# Patient Record
Sex: FEMALE | Race: BLACK OR AFRICAN AMERICAN | NOT HISPANIC OR LATINO
[De-identification: names, ages, dates, MRNs, and addresses within clinical notes are randomized per-mention and may not be internally consistent; named-entity substitution may affect disease eponyms.]

---

## 2017-03-29 ENCOUNTER — LABORATORY RESULT (OUTPATIENT)
Age: 62
End: 2017-03-29

## 2017-04-03 ENCOUNTER — APPOINTMENT (OUTPATIENT)
Age: 62
End: 2017-04-03

## 2017-04-03 VITALS
RESPIRATION RATE: 12 BRPM | TEMPERATURE: 98.2 F | SYSTOLIC BLOOD PRESSURE: 139 MMHG | HEART RATE: 73 BPM | WEIGHT: 188 LBS | DIASTOLIC BLOOD PRESSURE: 88 MMHG | HEIGHT: 68 IN | BODY MASS INDEX: 28.49 KG/M2

## 2017-04-13 ENCOUNTER — APPOINTMENT (OUTPATIENT)
Age: 62
End: 2017-04-13

## 2017-04-19 ENCOUNTER — APPOINTMENT (OUTPATIENT)
Age: 62
End: 2017-04-19

## 2017-10-19 ENCOUNTER — OUTPATIENT (OUTPATIENT)
Dept: OUTPATIENT SERVICES | Facility: HOSPITAL | Age: 62
LOS: 1 days | End: 2017-10-19
Payer: MEDICARE

## 2017-10-19 ENCOUNTER — APPOINTMENT (OUTPATIENT)
Dept: ULTRASOUND IMAGING | Facility: IMAGING CENTER | Age: 62
End: 2017-10-19
Payer: MEDICARE

## 2017-10-19 DIAGNOSIS — K76.0 FATTY (CHANGE OF) LIVER, NOT ELSEWHERE CLASSIFIED: ICD-10-CM

## 2017-10-19 LAB
ALBUMIN SERPL ELPH-MCNC: 4.6 G/DL
ALP BLD-CCNC: 114 U/L
ALT SERPL-CCNC: 62 U/L
ANION GAP SERPL CALC-SCNC: 14 MMOL/L
AST SERPL-CCNC: 54 U/L
BASOPHILS # BLD AUTO: 0.01 K/UL
BASOPHILS NFR BLD AUTO: 0.3 %
BILIRUB SERPL-MCNC: 0.7 MG/DL
BUN SERPL-MCNC: 12 MG/DL
CALCIUM SERPL-MCNC: 9.9 MG/DL
CHLORIDE SERPL-SCNC: 100 MMOL/L
CO2 SERPL-SCNC: 27 MMOL/L
CREAT SERPL-MCNC: 1.07 MG/DL
EOSINOPHIL # BLD AUTO: 0.06 K/UL
EOSINOPHIL NFR BLD AUTO: 2 %
HCT VFR BLD CALC: 42.5 %
HGB BLD-MCNC: 14.4 G/DL
IMM GRANULOCYTES NFR BLD AUTO: 0.3 %
INR PPP: 1.1 RATIO
LYMPHOCYTES # BLD AUTO: 1.35 K/UL
LYMPHOCYTES NFR BLD AUTO: 45.9 %
MAN DIFF?: NORMAL
MCHC RBC-ENTMCNC: 31.4 PG
MCHC RBC-ENTMCNC: 33.9 GM/DL
MCV RBC AUTO: 92.6 FL
MONOCYTES # BLD AUTO: 0.25 K/UL
MONOCYTES NFR BLD AUTO: 8.5 %
NEUTROPHILS # BLD AUTO: 1.26 K/UL
NEUTROPHILS NFR BLD AUTO: 43 %
PLATELET # BLD AUTO: 162 K/UL
POTASSIUM SERPL-SCNC: 3.8 MMOL/L
PROT SERPL-MCNC: 8.1 G/DL
PT BLD: 12.5 SEC
RBC # BLD: 4.59 M/UL
RBC # FLD: 13.5 %
SODIUM SERPL-SCNC: 141 MMOL/L
WBC # FLD AUTO: 2.94 K/UL

## 2017-10-19 PROCEDURE — 76700 US EXAM ABDOM COMPLETE: CPT

## 2017-10-19 PROCEDURE — 76700 US EXAM ABDOM COMPLETE: CPT | Mod: 26

## 2017-10-20 LAB — AFP-TM SERPL-MCNC: 7.6 NG/ML

## 2017-10-26 ENCOUNTER — APPOINTMENT (OUTPATIENT)
Age: 62
End: 2017-10-26
Payer: MEDICARE

## 2017-10-26 VITALS
RESPIRATION RATE: 17 BRPM | HEART RATE: 73 BPM | WEIGHT: 182 LBS | SYSTOLIC BLOOD PRESSURE: 138 MMHG | BODY MASS INDEX: 27.58 KG/M2 | TEMPERATURE: 98.4 F | HEIGHT: 68 IN | DIASTOLIC BLOOD PRESSURE: 86 MMHG

## 2017-10-26 PROCEDURE — 99214 OFFICE O/P EST MOD 30 MIN: CPT

## 2017-12-13 LAB
ALBUMIN SERPL ELPH-MCNC: 4.4 G/DL
ALP BLD-CCNC: 102 U/L
ALT SERPL-CCNC: 23 U/L
ANION GAP SERPL CALC-SCNC: 15 MMOL/L
AST SERPL-CCNC: 26 U/L
BILIRUB SERPL-MCNC: 0.5 MG/DL
BUN SERPL-MCNC: 15 MG/DL
CALCIUM SERPL-MCNC: 9.1 MG/DL
CHLORIDE SERPL-SCNC: 100 MMOL/L
CO2 SERPL-SCNC: 27 MMOL/L
CREAT SERPL-MCNC: 0.93 MG/DL
POTASSIUM SERPL-SCNC: 3.7 MMOL/L
PROT SERPL-MCNC: 7.8 G/DL
SODIUM SERPL-SCNC: 142 MMOL/L

## 2017-12-14 ENCOUNTER — APPOINTMENT (OUTPATIENT)
Age: 62
End: 2017-12-14
Payer: MEDICARE

## 2017-12-14 PROCEDURE — 91200 LIVER ELASTOGRAPHY: CPT

## 2018-01-01 ENCOUNTER — TRANSCRIPTION ENCOUNTER (OUTPATIENT)
Age: 63
End: 2018-01-01

## 2018-01-02 ENCOUNTER — TRANSCRIPTION ENCOUNTER (OUTPATIENT)
Age: 63
End: 2018-01-02

## 2018-01-31 ENCOUNTER — APPOINTMENT (OUTPATIENT)
Age: 63
End: 2018-01-31
Payer: MEDICARE

## 2018-01-31 VITALS
DIASTOLIC BLOOD PRESSURE: 142 MMHG | SYSTOLIC BLOOD PRESSURE: 173 MMHG | WEIGHT: 186 LBS | RESPIRATION RATE: 14 BRPM | BODY MASS INDEX: 28.19 KG/M2 | TEMPERATURE: 98.5 F | HEIGHT: 68 IN | HEART RATE: 69 BPM

## 2018-01-31 VITALS — DIASTOLIC BLOOD PRESSURE: 86 MMHG | SYSTOLIC BLOOD PRESSURE: 140 MMHG

## 2018-01-31 PROCEDURE — 99214 OFFICE O/P EST MOD 30 MIN: CPT

## 2018-02-13 ENCOUNTER — APPOINTMENT (OUTPATIENT)
Dept: OPHTHALMOLOGY | Facility: CLINIC | Age: 63
End: 2018-02-13
Payer: MEDICARE

## 2018-02-13 DIAGNOSIS — H16.002 UNSPECIFIED CORNEAL ULCER, LEFT EYE: ICD-10-CM

## 2018-02-13 PROCEDURE — 92014 COMPRE OPH EXAM EST PT 1/>: CPT

## 2018-04-30 ENCOUNTER — FORM ENCOUNTER (OUTPATIENT)
Age: 63
End: 2018-04-30

## 2018-05-01 ENCOUNTER — APPOINTMENT (OUTPATIENT)
Dept: ULTRASOUND IMAGING | Facility: IMAGING CENTER | Age: 63
End: 2018-05-01
Payer: MEDICARE

## 2018-05-01 ENCOUNTER — OUTPATIENT (OUTPATIENT)
Dept: OUTPATIENT SERVICES | Facility: HOSPITAL | Age: 63
LOS: 1 days | End: 2018-05-01
Payer: MEDICARE

## 2018-05-01 DIAGNOSIS — K74.60 UNSPECIFIED CIRRHOSIS OF LIVER: ICD-10-CM

## 2018-05-01 PROCEDURE — 76700 US EXAM ABDOM COMPLETE: CPT

## 2018-05-01 PROCEDURE — 76700 US EXAM ABDOM COMPLETE: CPT | Mod: 26

## 2018-05-02 ENCOUNTER — LABORATORY RESULT (OUTPATIENT)
Age: 63
End: 2018-05-02

## 2018-05-03 LAB
ALBUMIN SERPL ELPH-MCNC: 4.2 G/DL
ALP BLD-CCNC: 100 U/L
ALT SERPL-CCNC: 22 U/L
ANION GAP SERPL CALC-SCNC: 12 MMOL/L
AST SERPL-CCNC: 23 U/L
BASOPHILS # BLD AUTO: 0.02 K/UL
BASOPHILS NFR BLD AUTO: 0.6 %
BILIRUB SERPL-MCNC: 0.5 MG/DL
BUN SERPL-MCNC: 19 MG/DL
CALCIUM SERPL-MCNC: 9.3 MG/DL
CHLORIDE SERPL-SCNC: 100 MMOL/L
CO2 SERPL-SCNC: 27 MMOL/L
CREAT SERPL-MCNC: 0.93 MG/DL
EOSINOPHIL # BLD AUTO: 0.09 K/UL
EOSINOPHIL NFR BLD AUTO: 2.8 %
GLUCOSE SERPL-MCNC: 87 MG/DL
HCT VFR BLD CALC: 39.3 %
HGB BLD-MCNC: 13.3 G/DL
IMM GRANULOCYTES NFR BLD AUTO: 0 %
INR PPP: 1.11 RATIO
LYMPHOCYTES # BLD AUTO: 2.06 K/UL
LYMPHOCYTES NFR BLD AUTO: 65 %
MAN DIFF?: NORMAL
MCHC RBC-ENTMCNC: 31.2 PG
MCHC RBC-ENTMCNC: 33.8 GM/DL
MCV RBC AUTO: 92.3 FL
MONOCYTES # BLD AUTO: 0.32 K/UL
MONOCYTES NFR BLD AUTO: 10.1 %
NEUTROPHILS # BLD AUTO: 0.68 K/UL
NEUTROPHILS NFR BLD AUTO: 21.5 %
PLATELET # BLD AUTO: 145 K/UL
POTASSIUM SERPL-SCNC: 3.8 MMOL/L
PROT SERPL-MCNC: 7.4 G/DL
PT BLD: 12.6 SEC
RBC # BLD: 4.26 M/UL
RBC # FLD: 13.9 %
SODIUM SERPL-SCNC: 139 MMOL/L
WBC # FLD AUTO: 3.17 K/UL

## 2018-05-09 ENCOUNTER — APPOINTMENT (OUTPATIENT)
Dept: HEPATOLOGY | Facility: CLINIC | Age: 63
End: 2018-05-09
Payer: MEDICARE

## 2018-05-09 VITALS
TEMPERATURE: 98.5 F | HEART RATE: 70 BPM | WEIGHT: 188 LBS | DIASTOLIC BLOOD PRESSURE: 80 MMHG | SYSTOLIC BLOOD PRESSURE: 116 MMHG | RESPIRATION RATE: 14 BRPM | HEIGHT: 68 IN | BODY MASS INDEX: 28.49 KG/M2

## 2018-05-09 PROCEDURE — 99214 OFFICE O/P EST MOD 30 MIN: CPT

## 2018-11-12 ENCOUNTER — FORM ENCOUNTER (OUTPATIENT)
Age: 63
End: 2018-11-12

## 2018-11-13 ENCOUNTER — OUTPATIENT (OUTPATIENT)
Dept: OUTPATIENT SERVICES | Facility: HOSPITAL | Age: 63
LOS: 1 days | End: 2018-11-13
Payer: MEDICARE

## 2018-11-13 ENCOUNTER — APPOINTMENT (OUTPATIENT)
Dept: ULTRASOUND IMAGING | Facility: CLINIC | Age: 63
End: 2018-11-13
Payer: MEDICARE

## 2018-11-13 ENCOUNTER — LABORATORY RESULT (OUTPATIENT)
Age: 63
End: 2018-11-13

## 2018-11-13 DIAGNOSIS — Z00.8 ENCOUNTER FOR OTHER GENERAL EXAMINATION: ICD-10-CM

## 2018-11-13 PROCEDURE — 76700 US EXAM ABDOM COMPLETE: CPT | Mod: 26

## 2018-11-13 PROCEDURE — 76700 US EXAM ABDOM COMPLETE: CPT

## 2018-11-14 LAB
AFP-TM SERPL-MCNC: 7.3 NG/ML
ALBUMIN SERPL ELPH-MCNC: 4.5 G/DL
ALP BLD-CCNC: 101 U/L
ALT SERPL-CCNC: 25 U/L
ANION GAP SERPL CALC-SCNC: 14 MMOL/L
AST SERPL-CCNC: 24 U/L
BASOPHILS # BLD AUTO: 0.06 K/UL
BASOPHILS NFR BLD AUTO: 1.8 %
BILIRUB SERPL-MCNC: 0.5 MG/DL
BUN SERPL-MCNC: 19 MG/DL
CALCIUM SERPL-MCNC: 9.3 MG/DL
CHLORIDE SERPL-SCNC: 101 MMOL/L
CO2 SERPL-SCNC: 28 MMOL/L
CREAT SERPL-MCNC: 0.86 MG/DL
EOSINOPHIL # BLD AUTO: 0.09 K/UL
EOSINOPHIL NFR BLD AUTO: 2.6 %
HCT VFR BLD CALC: 40.7 %
HGB BLD-MCNC: 13.2 G/DL
INR PPP: 1.1 RATIO
LYMPHOCYTES # BLD AUTO: 1.9 K/UL
LYMPHOCYTES NFR BLD AUTO: 57.9 %
MAN DIFF?: NORMAL
MCHC RBC-ENTMCNC: 30.1 PG
MCHC RBC-ENTMCNC: 32.4 GM/DL
MCV RBC AUTO: 92.9 FL
MONOCYTES # BLD AUTO: 0.29 K/UL
MONOCYTES NFR BLD AUTO: 8.8 %
NEUTROPHILS # BLD AUTO: 0.84 K/UL
NEUTROPHILS NFR BLD AUTO: 25.4 %
PLATELET # BLD AUTO: 169 K/UL
POTASSIUM SERPL-SCNC: 4.2 MMOL/L
PROT SERPL-MCNC: 8 G/DL
PT BLD: 12.4 SEC
RBC # BLD: 4.38 M/UL
RBC # FLD: 13.5 %
SODIUM SERPL-SCNC: 143 MMOL/L
WBC # FLD AUTO: 3.29 K/UL

## 2018-11-21 ENCOUNTER — APPOINTMENT (OUTPATIENT)
Dept: HEPATOLOGY | Facility: CLINIC | Age: 63
End: 2018-11-21
Payer: MEDICARE

## 2018-11-21 VITALS
SYSTOLIC BLOOD PRESSURE: 142 MMHG | RESPIRATION RATE: 17 BRPM | HEIGHT: 68 IN | WEIGHT: 187 LBS | BODY MASS INDEX: 28.34 KG/M2 | TEMPERATURE: 98.2 F | DIASTOLIC BLOOD PRESSURE: 89 MMHG | HEART RATE: 61 BPM

## 2018-11-21 PROCEDURE — 99214 OFFICE O/P EST MOD 30 MIN: CPT

## 2019-05-14 ENCOUNTER — FORM ENCOUNTER (OUTPATIENT)
Age: 64
End: 2019-05-14

## 2019-05-15 ENCOUNTER — APPOINTMENT (OUTPATIENT)
Dept: ULTRASOUND IMAGING | Facility: CLINIC | Age: 64
End: 2019-05-15
Payer: MEDICARE

## 2019-05-15 ENCOUNTER — OUTPATIENT (OUTPATIENT)
Dept: OUTPATIENT SERVICES | Facility: HOSPITAL | Age: 64
LOS: 1 days | End: 2019-05-15
Payer: MEDICARE

## 2019-05-15 DIAGNOSIS — K74.60 UNSPECIFIED CIRRHOSIS OF LIVER: ICD-10-CM

## 2019-05-15 DIAGNOSIS — Z00.8 ENCOUNTER FOR OTHER GENERAL EXAMINATION: ICD-10-CM

## 2019-05-15 LAB
AFP-TM SERPL-MCNC: 7.5 NG/ML
ALBUMIN SERPL ELPH-MCNC: 4.4 G/DL
ALP BLD-CCNC: 82 U/L
ALT SERPL-CCNC: 28 U/L
ANION GAP SERPL CALC-SCNC: 12 MMOL/L
AST SERPL-CCNC: 25 U/L
BILIRUB SERPL-MCNC: 0.4 MG/DL
BUN SERPL-MCNC: 14 MG/DL
CALCIUM SERPL-MCNC: 9.3 MG/DL
CHLORIDE SERPL-SCNC: 102 MMOL/L
CO2 SERPL-SCNC: 28 MMOL/L
CREAT SERPL-MCNC: 0.83 MG/DL
INR PPP: 1.1 RATIO
POTASSIUM SERPL-SCNC: 4 MMOL/L
PROT SERPL-MCNC: 7.5 G/DL
PT BLD: 12.7 SEC
SODIUM SERPL-SCNC: 142 MMOL/L

## 2019-05-15 PROCEDURE — 76700 US EXAM ABDOM COMPLETE: CPT | Mod: 26

## 2019-05-15 PROCEDURE — 76700 US EXAM ABDOM COMPLETE: CPT

## 2019-05-16 LAB
BASOPHILS # BLD AUTO: 0.05 K/UL
BASOPHILS NFR BLD AUTO: 1.4 %
EOSINOPHIL # BLD AUTO: 0.11 K/UL
EOSINOPHIL NFR BLD AUTO: 3.2 %
HCT VFR BLD CALC: 41.8 %
HGB BLD-MCNC: 13.7 G/DL
IMM GRANULOCYTES NFR BLD AUTO: 0 %
LYMPHOCYTES # BLD AUTO: 2.17 K/UL
LYMPHOCYTES NFR BLD AUTO: 62.2 %
MAN DIFF?: NORMAL
MCHC RBC-ENTMCNC: 31.6 PG
MCHC RBC-ENTMCNC: 32.8 GM/DL
MCV RBC AUTO: 96.5 FL
MONOCYTES # BLD AUTO: 0.4 K/UL
MONOCYTES NFR BLD AUTO: 11.5 %
NEUTROPHILS # BLD AUTO: 0.76 K/UL
NEUTROPHILS NFR BLD AUTO: 21.7 %
PLATELET # BLD AUTO: 157 K/UL
RBC # BLD: 4.33 M/UL
RBC # FLD: 12.7 %
WBC # FLD AUTO: 3.49 K/UL

## 2019-05-22 ENCOUNTER — APPOINTMENT (OUTPATIENT)
Dept: HEPATOLOGY | Facility: CLINIC | Age: 64
End: 2019-05-22
Payer: MEDICARE

## 2019-05-22 VITALS
RESPIRATION RATE: 16 BRPM | WEIGHT: 190 LBS | TEMPERATURE: 98.4 F | SYSTOLIC BLOOD PRESSURE: 143 MMHG | BODY MASS INDEX: 28.79 KG/M2 | HEIGHT: 68 IN | DIASTOLIC BLOOD PRESSURE: 94 MMHG | HEART RATE: 71 BPM

## 2019-05-22 PROCEDURE — 99214 OFFICE O/P EST MOD 30 MIN: CPT

## 2019-07-05 ENCOUNTER — TRANSCRIPTION ENCOUNTER (OUTPATIENT)
Age: 64
End: 2019-07-05

## 2019-10-03 ENCOUNTER — NON-APPOINTMENT (OUTPATIENT)
Age: 64
End: 2019-10-03

## 2019-10-03 ENCOUNTER — APPOINTMENT (OUTPATIENT)
Dept: OPHTHALMOLOGY | Facility: CLINIC | Age: 64
End: 2019-10-03
Payer: MEDICARE

## 2019-10-03 PROCEDURE — 92014 COMPRE OPH EXAM EST PT 1/>: CPT

## 2019-10-03 PROCEDURE — 92134 CPTRZ OPH DX IMG PST SGM RTA: CPT

## 2019-10-24 ENCOUNTER — NON-APPOINTMENT (OUTPATIENT)
Age: 64
End: 2019-10-24

## 2019-10-24 ENCOUNTER — APPOINTMENT (OUTPATIENT)
Dept: OPHTHALMOLOGY | Facility: CLINIC | Age: 64
End: 2019-10-24
Payer: MEDICARE

## 2019-10-24 PROCEDURE — 92014 COMPRE OPH EXAM EST PT 1/>: CPT

## 2019-11-06 ENCOUNTER — FORM ENCOUNTER (OUTPATIENT)
Age: 64
End: 2019-11-06

## 2019-11-07 ENCOUNTER — OUTPATIENT (OUTPATIENT)
Dept: OUTPATIENT SERVICES | Facility: HOSPITAL | Age: 64
LOS: 1 days | End: 2019-11-07
Payer: MEDICARE

## 2019-11-07 ENCOUNTER — APPOINTMENT (OUTPATIENT)
Dept: ULTRASOUND IMAGING | Facility: CLINIC | Age: 64
End: 2019-11-07
Payer: MEDICARE

## 2019-11-07 DIAGNOSIS — Z00.8 ENCOUNTER FOR OTHER GENERAL EXAMINATION: ICD-10-CM

## 2019-11-07 PROCEDURE — 76700 US EXAM ABDOM COMPLETE: CPT

## 2019-11-07 PROCEDURE — 76700 US EXAM ABDOM COMPLETE: CPT | Mod: 26

## 2019-11-11 ENCOUNTER — LABORATORY RESULT (OUTPATIENT)
Age: 64
End: 2019-11-11

## 2019-11-12 LAB
AFP-TM SERPL-MCNC: 7.5 NG/ML
ALBUMIN SERPL ELPH-MCNC: 4.5 G/DL
ALP BLD-CCNC: 86 U/L
ALT SERPL-CCNC: 25 U/L
ANION GAP SERPL CALC-SCNC: 14 MMOL/L
AST SERPL-CCNC: 24 U/L
BASOPHILS # BLD AUTO: 0.03 K/UL
BASOPHILS NFR BLD AUTO: 0.9 %
BILIRUB SERPL-MCNC: 0.5 MG/DL
BUN SERPL-MCNC: 17 MG/DL
CALCIUM SERPL-MCNC: 9.8 MG/DL
CHLORIDE SERPL-SCNC: 100 MMOL/L
CO2 SERPL-SCNC: 26 MMOL/L
CREAT SERPL-MCNC: 0.93 MG/DL
EOSINOPHIL # BLD AUTO: 0.1 K/UL
EOSINOPHIL NFR BLD AUTO: 2.9 %
HCT VFR BLD CALC: 43.9 %
HGB BLD-MCNC: 14.2 G/DL
IMM GRANULOCYTES NFR BLD AUTO: 0 %
INR PPP: 1.13 RATIO
LYMPHOCYTES # BLD AUTO: 2.22 K/UL
LYMPHOCYTES NFR BLD AUTO: 63.8 %
MAN DIFF?: NORMAL
MCHC RBC-ENTMCNC: 31.3 PG
MCHC RBC-ENTMCNC: 32.3 GM/DL
MCV RBC AUTO: 96.9 FL
MONOCYTES # BLD AUTO: 0.35 K/UL
MONOCYTES NFR BLD AUTO: 10.1 %
NEUTROPHILS # BLD AUTO: 0.78 K/UL
NEUTROPHILS NFR BLD AUTO: 22.3 %
PLATELET # BLD AUTO: 150 K/UL
POTASSIUM SERPL-SCNC: 4.2 MMOL/L
PROT SERPL-MCNC: 7.9 G/DL
PT BLD: 12.9 SEC
RBC # BLD: 4.53 M/UL
RBC # FLD: 13.2 %
SODIUM SERPL-SCNC: 140 MMOL/L
WBC # FLD AUTO: 3.48 K/UL

## 2019-11-14 ENCOUNTER — APPOINTMENT (OUTPATIENT)
Dept: HEPATOLOGY | Facility: CLINIC | Age: 64
End: 2019-11-14
Payer: MEDICARE

## 2019-11-14 VITALS
BODY MASS INDEX: 28.04 KG/M2 | SYSTOLIC BLOOD PRESSURE: 127 MMHG | WEIGHT: 185 LBS | DIASTOLIC BLOOD PRESSURE: 81 MMHG | HEART RATE: 78 BPM | TEMPERATURE: 98.2 F | HEIGHT: 68 IN | RESPIRATION RATE: 16 BRPM

## 2019-11-14 PROCEDURE — 99214 OFFICE O/P EST MOD 30 MIN: CPT

## 2019-11-26 ENCOUNTER — APPOINTMENT (OUTPATIENT)
Dept: OPHTHALMOLOGY | Facility: CLINIC | Age: 64
End: 2019-11-26
Payer: MEDICARE

## 2019-11-26 ENCOUNTER — NON-APPOINTMENT (OUTPATIENT)
Age: 64
End: 2019-11-26

## 2019-11-26 PROCEDURE — 92014 COMPRE OPH EXAM EST PT 1/>: CPT

## 2020-05-14 ENCOUNTER — APPOINTMENT (OUTPATIENT)
Dept: ULTRASOUND IMAGING | Facility: CLINIC | Age: 65
End: 2020-05-14

## 2020-05-14 ENCOUNTER — OUTPATIENT (OUTPATIENT)
Dept: OUTPATIENT SERVICES | Facility: HOSPITAL | Age: 65
LOS: 1 days | End: 2020-05-14
Payer: COMMERCIAL

## 2020-05-14 ENCOUNTER — RESULT REVIEW (OUTPATIENT)
Age: 65
End: 2020-05-14

## 2020-05-14 DIAGNOSIS — K74.60 UNSPECIFIED CIRRHOSIS OF LIVER: ICD-10-CM

## 2020-05-14 PROCEDURE — 76700 US EXAM ABDOM COMPLETE: CPT

## 2020-05-14 PROCEDURE — 76700 US EXAM ABDOM COMPLETE: CPT | Mod: 26

## 2020-06-16 ENCOUNTER — APPOINTMENT (OUTPATIENT)
Dept: HEPATOLOGY | Facility: CLINIC | Age: 65
End: 2020-06-16
Payer: MEDICARE

## 2020-06-16 PROCEDURE — 91200 LIVER ELASTOGRAPHY: CPT

## 2020-06-23 ENCOUNTER — LABORATORY RESULT (OUTPATIENT)
Age: 65
End: 2020-06-23

## 2020-06-24 LAB
AFP-TM SERPL-MCNC: 6.8 NG/ML
ALBUMIN SERPL ELPH-MCNC: 4.7 G/DL
ALP BLD-CCNC: 77 U/L
ALT SERPL-CCNC: 21 U/L
ANION GAP SERPL CALC-SCNC: 13 MMOL/L
AST SERPL-CCNC: 21 U/L
BASOPHILS # BLD AUTO: 0.03 K/UL
BASOPHILS NFR BLD AUTO: 0.8 %
BILIRUB SERPL-MCNC: 0.6 MG/DL
BUN SERPL-MCNC: 15 MG/DL
CALCIUM SERPL-MCNC: 9.2 MG/DL
CHLORIDE SERPL-SCNC: 102 MMOL/L
CO2 SERPL-SCNC: 26 MMOL/L
CREAT SERPL-MCNC: 0.92 MG/DL
EOSINOPHIL # BLD AUTO: 0.14 K/UL
EOSINOPHIL NFR BLD AUTO: 3.6 %
HCT VFR BLD CALC: 41.7 %
HGB BLD-MCNC: 13.5 G/DL
IMM GRANULOCYTES NFR BLD AUTO: 0 %
INR PPP: 1.16 RATIO
LYMPHOCYTES # BLD AUTO: 2.34 K/UL
LYMPHOCYTES NFR BLD AUTO: 59.8 %
MAN DIFF?: NORMAL
MCHC RBC-ENTMCNC: 31.3 PG
MCHC RBC-ENTMCNC: 32.4 GM/DL
MCV RBC AUTO: 96.5 FL
MONOCYTES # BLD AUTO: 0.5 K/UL
MONOCYTES NFR BLD AUTO: 12.8 %
NEUTROPHILS # BLD AUTO: 0.9 K/UL
NEUTROPHILS NFR BLD AUTO: 23 %
PLATELET # BLD AUTO: 148 K/UL
POTASSIUM SERPL-SCNC: 4.1 MMOL/L
PROT SERPL-MCNC: 7.5 G/DL
PT BLD: 13.2 SEC
RBC # BLD: 4.32 M/UL
RBC # FLD: 12.8 %
SODIUM SERPL-SCNC: 141 MMOL/L
WBC # FLD AUTO: 3.91 K/UL

## 2020-06-25 ENCOUNTER — APPOINTMENT (OUTPATIENT)
Dept: HEPATOLOGY | Facility: CLINIC | Age: 65
End: 2020-06-25

## 2020-09-09 ENCOUNTER — RESULT REVIEW (OUTPATIENT)
Age: 65
End: 2020-09-09

## 2020-09-23 ENCOUNTER — APPOINTMENT (OUTPATIENT)
Dept: HEPATOLOGY | Facility: CLINIC | Age: 65
End: 2020-09-23
Payer: MEDICARE

## 2020-09-23 VITALS
HEART RATE: 61 BPM | WEIGHT: 190 LBS | TEMPERATURE: 97.9 F | RESPIRATION RATE: 16 BRPM | BODY MASS INDEX: 28.79 KG/M2 | DIASTOLIC BLOOD PRESSURE: 93 MMHG | HEIGHT: 68 IN | SYSTOLIC BLOOD PRESSURE: 145 MMHG

## 2020-09-23 PROCEDURE — 99214 OFFICE O/P EST MOD 30 MIN: CPT

## 2020-10-27 ENCOUNTER — NON-APPOINTMENT (OUTPATIENT)
Age: 65
End: 2020-10-27

## 2020-10-27 ENCOUNTER — APPOINTMENT (OUTPATIENT)
Dept: OPHTHALMOLOGY | Facility: CLINIC | Age: 65
End: 2020-10-27
Payer: MEDICARE

## 2020-10-27 PROCEDURE — 92014 COMPRE OPH EXAM EST PT 1/>: CPT

## 2020-10-27 PROCEDURE — 99072 ADDL SUPL MATRL&STAF TM PHE: CPT

## 2020-12-14 ENCOUNTER — APPOINTMENT (OUTPATIENT)
Dept: ULTRASOUND IMAGING | Facility: CLINIC | Age: 65
End: 2020-12-14
Payer: MEDICARE

## 2020-12-14 ENCOUNTER — APPOINTMENT (OUTPATIENT)
Dept: MAMMOGRAPHY | Facility: CLINIC | Age: 65
End: 2020-12-14
Payer: MEDICARE

## 2020-12-14 ENCOUNTER — OUTPATIENT (OUTPATIENT)
Dept: OUTPATIENT SERVICES | Facility: HOSPITAL | Age: 65
LOS: 1 days | End: 2020-12-14
Payer: MEDICARE

## 2020-12-14 DIAGNOSIS — Z00.00 ENCOUNTER FOR GENERAL ADULT MEDICAL EXAMINATION WITHOUT ABNORMAL FINDINGS: ICD-10-CM

## 2020-12-14 PROCEDURE — 77063 BREAST TOMOSYNTHESIS BI: CPT | Mod: 26

## 2020-12-14 PROCEDURE — 77067 SCR MAMMO BI INCL CAD: CPT

## 2020-12-14 PROCEDURE — 76641 ULTRASOUND BREAST COMPLETE: CPT

## 2020-12-14 PROCEDURE — 77067 SCR MAMMO BI INCL CAD: CPT | Mod: 26

## 2020-12-14 PROCEDURE — 76641 ULTRASOUND BREAST COMPLETE: CPT | Mod: 26,50

## 2020-12-14 PROCEDURE — 77063 BREAST TOMOSYNTHESIS BI: CPT

## 2020-12-22 ENCOUNTER — APPOINTMENT (OUTPATIENT)
Dept: OPHTHALMOLOGY | Facility: CLINIC | Age: 65
End: 2020-12-22

## 2021-04-05 ENCOUNTER — OUTPATIENT (OUTPATIENT)
Dept: OUTPATIENT SERVICES | Facility: HOSPITAL | Age: 66
LOS: 1 days | End: 2021-04-05
Payer: MEDICARE

## 2021-04-05 ENCOUNTER — APPOINTMENT (OUTPATIENT)
Dept: ULTRASOUND IMAGING | Facility: CLINIC | Age: 66
End: 2021-04-05
Payer: MEDICARE

## 2021-04-05 ENCOUNTER — RESULT REVIEW (OUTPATIENT)
Age: 66
End: 2021-04-05

## 2021-04-05 ENCOUNTER — LABORATORY RESULT (OUTPATIENT)
Age: 66
End: 2021-04-05

## 2021-04-05 DIAGNOSIS — Z00.8 ENCOUNTER FOR OTHER GENERAL EXAMINATION: ICD-10-CM

## 2021-04-05 PROCEDURE — 76700 US EXAM ABDOM COMPLETE: CPT | Mod: 26

## 2021-04-05 PROCEDURE — 76700 US EXAM ABDOM COMPLETE: CPT

## 2021-04-06 ENCOUNTER — NON-APPOINTMENT (OUTPATIENT)
Age: 66
End: 2021-04-06

## 2021-04-06 LAB
AFP-TM SERPL-MCNC: 11.2 NG/ML
ALBUMIN SERPL ELPH-MCNC: 4.8 G/DL
ALP BLD-CCNC: 93 U/L
ALT SERPL-CCNC: 25 U/L
ANION GAP SERPL CALC-SCNC: 11 MMOL/L
AST SERPL-CCNC: 25 U/L
BASOPHILS # BLD AUTO: 0.03 K/UL
BASOPHILS NFR BLD AUTO: 0.9 %
BILIRUB SERPL-MCNC: 0.4 MG/DL
BUN SERPL-MCNC: 13 MG/DL
CALCIUM SERPL-MCNC: 9.5 MG/DL
CHLORIDE SERPL-SCNC: 102 MMOL/L
CO2 SERPL-SCNC: 30 MMOL/L
CREAT SERPL-MCNC: 0.96 MG/DL
EOSINOPHIL # BLD AUTO: 0.12 K/UL
EOSINOPHIL NFR BLD AUTO: 3.4 %
HCT VFR BLD CALC: 42.9 %
HGB BLD-MCNC: 13.9 G/DL
IMM GRANULOCYTES NFR BLD AUTO: 0.3 %
INR PPP: 1.12 RATIO
LYMPHOCYTES # BLD AUTO: 2.05 K/UL
LYMPHOCYTES NFR BLD AUTO: 58.4 %
MAN DIFF?: NORMAL
MCHC RBC-ENTMCNC: 31.8 PG
MCHC RBC-ENTMCNC: 32.4 GM/DL
MCV RBC AUTO: 98.2 FL
MONOCYTES # BLD AUTO: 0.4 K/UL
MONOCYTES NFR BLD AUTO: 11.4 %
NEUTROPHILS # BLD AUTO: 0.9 K/UL
NEUTROPHILS NFR BLD AUTO: 25.6 %
PLATELET # BLD AUTO: 160 K/UL
POTASSIUM SERPL-SCNC: 4.1 MMOL/L
PROT SERPL-MCNC: 7.8 G/DL
PT BLD: 13.2 SEC
RBC # BLD: 4.37 M/UL
RBC # FLD: 13 %
SODIUM SERPL-SCNC: 142 MMOL/L
WBC # FLD AUTO: 3.51 K/UL

## 2021-04-12 ENCOUNTER — APPOINTMENT (OUTPATIENT)
Dept: HEPATOLOGY | Facility: CLINIC | Age: 66
End: 2021-04-12
Payer: MEDICARE

## 2021-04-12 VITALS
SYSTOLIC BLOOD PRESSURE: 121 MMHG | TEMPERATURE: 94.6 F | RESPIRATION RATE: 16 BRPM | WEIGHT: 185 LBS | HEART RATE: 72 BPM | DIASTOLIC BLOOD PRESSURE: 84 MMHG | BODY MASS INDEX: 28.04 KG/M2 | HEIGHT: 68 IN

## 2021-04-12 PROCEDURE — 99214 OFFICE O/P EST MOD 30 MIN: CPT

## 2021-04-12 PROCEDURE — 99072 ADDL SUPL MATRL&STAF TM PHE: CPT

## 2021-09-24 ENCOUNTER — NON-APPOINTMENT (OUTPATIENT)
Age: 66
End: 2021-09-24

## 2021-09-26 ENCOUNTER — RESULT CHARGE (OUTPATIENT)
Age: 66
End: 2021-09-26

## 2021-09-28 ENCOUNTER — APPOINTMENT (OUTPATIENT)
Dept: OPHTHALMOLOGY | Facility: CLINIC | Age: 66
End: 2021-09-28
Payer: MEDICARE

## 2021-09-28 ENCOUNTER — NON-APPOINTMENT (OUTPATIENT)
Age: 66
End: 2021-09-28

## 2021-09-28 ENCOUNTER — APPOINTMENT (OUTPATIENT)
Dept: OPHTHALMOLOGY | Facility: CLINIC | Age: 66
End: 2021-09-28

## 2021-09-28 PROCEDURE — 92014 COMPRE OPH EXAM EST PT 1/>: CPT

## 2021-12-06 ENCOUNTER — APPOINTMENT (OUTPATIENT)
Dept: CARDIOLOGY | Facility: CLINIC | Age: 66
End: 2021-12-06
Payer: MEDICARE

## 2021-12-06 ENCOUNTER — NON-APPOINTMENT (OUTPATIENT)
Age: 66
End: 2021-12-06

## 2021-12-06 VITALS
HEIGHT: 68 IN | OXYGEN SATURATION: 99 % | DIASTOLIC BLOOD PRESSURE: 89 MMHG | BODY MASS INDEX: 26.98 KG/M2 | HEART RATE: 74 BPM | WEIGHT: 178 LBS | SYSTOLIC BLOOD PRESSURE: 130 MMHG

## 2021-12-06 DIAGNOSIS — Z00.00 ENCOUNTER FOR GENERAL ADULT MEDICAL EXAMINATION W/OUT ABNORMAL FINDINGS: ICD-10-CM

## 2021-12-06 DIAGNOSIS — K21.9 GASTRO-ESOPHAGEAL REFLUX DISEASE W/OUT ESOPHAGITIS: ICD-10-CM

## 2021-12-06 PROCEDURE — 93000 ELECTROCARDIOGRAM COMPLETE: CPT

## 2021-12-06 PROCEDURE — 99204 OFFICE O/P NEW MOD 45 MIN: CPT

## 2021-12-07 LAB
ALBUMIN SERPL ELPH-MCNC: 4.8 G/DL
ALP BLD-CCNC: 98 U/L
ALT SERPL-CCNC: 26 U/L
ANION GAP SERPL CALC-SCNC: 16 MMOL/L
AST SERPL-CCNC: 26 U/L
BASOPHILS # BLD AUTO: 0.04 K/UL
BASOPHILS NFR BLD AUTO: 0.9 %
BILIRUB SERPL-MCNC: 0.5 MG/DL
BUN SERPL-MCNC: 14 MG/DL
CALCIUM SERPL-MCNC: 9.6 MG/DL
CHLORIDE SERPL-SCNC: 97 MMOL/L
CHOLEST SERPL-MCNC: 218 MG/DL
CO2 SERPL-SCNC: 25 MMOL/L
CREAT SERPL-MCNC: 0.92 MG/DL
EOSINOPHIL # BLD AUTO: 0.12 K/UL
EOSINOPHIL NFR BLD AUTO: 2.6 %
ESTIMATED AVERAGE GLUCOSE: 114 MG/DL
GLUCOSE SERPL-MCNC: 87 MG/DL
HBA1C MFR BLD HPLC: 5.6 %
HCT VFR BLD CALC: 42.5 %
HDLC SERPL-MCNC: 68 MG/DL
HGB BLD-MCNC: 14 G/DL
IMM GRANULOCYTES NFR BLD AUTO: 0 %
LDLC SERPL CALC-MCNC: 127 MG/DL
LYMPHOCYTES # BLD AUTO: 2.29 K/UL
LYMPHOCYTES NFR BLD AUTO: 49.6 %
MAN DIFF?: NORMAL
MCHC RBC-ENTMCNC: 31.3 PG
MCHC RBC-ENTMCNC: 32.9 GM/DL
MCV RBC AUTO: 94.9 FL
MONOCYTES # BLD AUTO: 0.46 K/UL
MONOCYTES NFR BLD AUTO: 10 %
NEUTROPHILS # BLD AUTO: 1.71 K/UL
NEUTROPHILS NFR BLD AUTO: 36.9 %
NONHDLC SERPL-MCNC: 150 MG/DL
PLATELET # BLD AUTO: 165 K/UL
POTASSIUM SERPL-SCNC: 4.2 MMOL/L
PROT SERPL-MCNC: 7.6 G/DL
RBC # BLD: 4.48 M/UL
RBC # FLD: 12.8 %
SODIUM SERPL-SCNC: 137 MMOL/L
TRIGL SERPL-MCNC: 116 MG/DL
TSH SERPL-ACNC: 1.31 UIU/ML
WBC # FLD AUTO: 4.62 K/UL

## 2021-12-07 NOTE — HISTORY OF PRESENT ILLNESS
[FreeTextEntry1] : 66 year old female with history of hypertension, hyperlipidemia and a fatty liver with  cirrhosis secondary to hepatitis C.\par She was treated back in 2011 with Pegasys 180 mcg once per week plus Ribavirin 600mg twice per day and Incivek. She competed 12 weeks of Incivek and continue with Pegasys and Ribavirin. \par In 2014, she started on Sovaldi 400 mg daily and completed all treatment and was found to be "cured".\par \par Dr. Zac Cherry, hepatologist follow with Ms. Rowe annually for an abdominal scan and twice yearly an abdominal sonogram and blood work .\par Scheduled for a repeat abdominal sonogram on December 13, 2021. Follow up visit with Dr. Cherry on December 16, 2021.\par \par Of note, when patient was seen by PCP, Dr. Leon , he recommended that patient be evaluated by a cardiologist for ongoing blood pressure management and a cardiovascular screening. \par \par Blood pressure today is 130/89. She has an ambi BP monitor but doesn’t check her BP.\par EKG- sinus rhythm \par \vargas Had been active before COVID - amy several times weekly bit less active now as she is no vaccinated, ( states that she is afraid of needles).\par \par Never had CP, WALL or palpitations.

## 2021-12-07 NOTE — ASSESSMENT
[FreeTextEntry1] : 66 year old female with history of hypertension, hyperlipidemia and a fatty liver with  cirrhosis secondary to hepatitis C.\par \par #Hypertension\par   Blood pressure is within reasonable limits\par   Continue on Amlodipine 5 mg QD\par   Continue on Avalide 150/ 12.5 mg QD\par   BP log for 30 days at home\par   \par \par #Hyperlipidemia\par   Will drawn a fasting lipid panel to assess current cholesterol status\par \par #Cirrhosis/ Hx of Hepatitis C\par   Completed treated in 2014\par   Followed by Dr. Zac Cherry\par \par #Adverse Cardiovascular Risk Factors\par   Personal history of HTN./ HLD\par   Plan to drawn full fasting blood work panel: CBC, CMP, Hgb A1C, TSH, Lipid panel\par   Based on patient's ASCVD risk factor, will consider an exercise stress test vs. Cardiac CTA\par \par Follow up post testing.\par

## 2021-12-07 NOTE — PHYSICAL EXAM
[Normal Rate] : normal [Rhythm Regular] : regular [Normal S1] : normal S1 [Normal S2] : normal S2 [Normal S1, S2] : normal S1, S2 [No Rub] : no rub [No Gallop] : no gallop [Normal] : alert and oriented, normal memory [de-identified] : soft HSM

## 2021-12-13 ENCOUNTER — OUTPATIENT (OUTPATIENT)
Dept: OUTPATIENT SERVICES | Facility: HOSPITAL | Age: 66
LOS: 1 days | End: 2021-12-13
Payer: MEDICARE

## 2021-12-13 ENCOUNTER — TRANSCRIPTION ENCOUNTER (OUTPATIENT)
Age: 66
End: 2021-12-13

## 2021-12-13 ENCOUNTER — APPOINTMENT (OUTPATIENT)
Dept: ULTRASOUND IMAGING | Facility: CLINIC | Age: 66
End: 2021-12-13
Payer: MEDICARE

## 2021-12-13 DIAGNOSIS — Z00.8 ENCOUNTER FOR OTHER GENERAL EXAMINATION: ICD-10-CM

## 2021-12-13 PROCEDURE — 76700 US EXAM ABDOM COMPLETE: CPT | Mod: 26

## 2021-12-13 PROCEDURE — 76700 US EXAM ABDOM COMPLETE: CPT

## 2021-12-14 ENCOUNTER — LABORATORY RESULT (OUTPATIENT)
Age: 66
End: 2021-12-14

## 2021-12-14 ENCOUNTER — NON-APPOINTMENT (OUTPATIENT)
Age: 66
End: 2021-12-14

## 2021-12-14 ENCOUNTER — TRANSCRIPTION ENCOUNTER (OUTPATIENT)
Age: 66
End: 2021-12-14

## 2021-12-16 ENCOUNTER — NON-APPOINTMENT (OUTPATIENT)
Age: 66
End: 2021-12-16

## 2021-12-16 LAB
AFP-TM SERPL-MCNC: 35.7 NG/ML
ALBUMIN SERPL ELPH-MCNC: 4.6 G/DL
ALP BLD-CCNC: 96 U/L
ALT SERPL-CCNC: 25 U/L
ANION GAP SERPL CALC-SCNC: 12 MMOL/L
AST SERPL-CCNC: 23 U/L
BASOPHILS # BLD AUTO: 0.03 K/UL
BASOPHILS NFR BLD AUTO: 0.8 %
BILIRUB SERPL-MCNC: 0.5 MG/DL
BUN SERPL-MCNC: 13 MG/DL
CALCIUM SERPL-MCNC: 9.2 MG/DL
CHLORIDE SERPL-SCNC: 101 MMOL/L
CO2 SERPL-SCNC: 28 MMOL/L
CREAT SERPL-MCNC: 0.9 MG/DL
EOSINOPHIL # BLD AUTO: 0.17 K/UL
EOSINOPHIL NFR BLD AUTO: 4.5 %
HCT VFR BLD CALC: 40.9 %
HGB BLD-MCNC: 13.3 G/DL
IMM GRANULOCYTES NFR BLD AUTO: 0 %
INR PPP: 1.17 RATIO
LYMPHOCYTES # BLD AUTO: 2.26 K/UL
LYMPHOCYTES NFR BLD AUTO: 59.9 %
MAN DIFF?: NORMAL
MCHC RBC-ENTMCNC: 32.1 PG
MCHC RBC-ENTMCNC: 32.5 GM/DL
MCV RBC AUTO: 98.8 FL
MONOCYTES # BLD AUTO: 0.42 K/UL
MONOCYTES NFR BLD AUTO: 11.1 %
NEUTROPHILS # BLD AUTO: 0.89 K/UL
NEUTROPHILS NFR BLD AUTO: 23.7 %
PLATELET # BLD AUTO: 148 K/UL
POTASSIUM SERPL-SCNC: 3.9 MMOL/L
PROT SERPL-MCNC: 7.6 G/DL
PT BLD: 13.7 SEC
RBC # BLD: 4.14 M/UL
RBC # FLD: 13.1 %
SODIUM SERPL-SCNC: 140 MMOL/L
WBC # FLD AUTO: 3.77 K/UL

## 2021-12-20 ENCOUNTER — APPOINTMENT (OUTPATIENT)
Dept: HEPATOLOGY | Facility: CLINIC | Age: 66
End: 2021-12-20
Payer: MEDICARE

## 2021-12-20 VITALS
SYSTOLIC BLOOD PRESSURE: 130 MMHG | OXYGEN SATURATION: 99 % | DIASTOLIC BLOOD PRESSURE: 87 MMHG | TEMPERATURE: 97.2 F | RESPIRATION RATE: 16 BRPM | BODY MASS INDEX: 27.28 KG/M2 | WEIGHT: 180 LBS | HEART RATE: 58 BPM | HEIGHT: 68 IN

## 2021-12-20 PROCEDURE — 99214 OFFICE O/P EST MOD 30 MIN: CPT

## 2021-12-20 PROCEDURE — 91200 LIVER ELASTOGRAPHY: CPT

## 2021-12-28 ENCOUNTER — NON-APPOINTMENT (OUTPATIENT)
Age: 66
End: 2021-12-28

## 2022-01-10 ENCOUNTER — NON-APPOINTMENT (OUTPATIENT)
Age: 67
End: 2022-01-10

## 2022-01-10 ENCOUNTER — APPOINTMENT (OUTPATIENT)
Dept: MRI IMAGING | Facility: CLINIC | Age: 67
End: 2022-01-10
Payer: COMMERCIAL

## 2022-01-10 ENCOUNTER — OUTPATIENT (OUTPATIENT)
Dept: OUTPATIENT SERVICES | Facility: HOSPITAL | Age: 67
LOS: 1 days | End: 2022-01-10
Payer: COMMERCIAL

## 2022-01-10 DIAGNOSIS — Z00.8 ENCOUNTER FOR OTHER GENERAL EXAMINATION: ICD-10-CM

## 2022-01-10 DIAGNOSIS — K74.60 UNSPECIFIED CIRRHOSIS OF LIVER: ICD-10-CM

## 2022-01-10 PROCEDURE — 74183 MRI ABD W/O CNTR FLWD CNTR: CPT | Mod: 26

## 2022-01-10 PROCEDURE — A9585: CPT

## 2022-01-10 PROCEDURE — 74183 MRI ABD W/O CNTR FLWD CNTR: CPT

## 2022-01-19 ENCOUNTER — APPOINTMENT (OUTPATIENT)
Dept: HEPATOLOGY | Facility: CLINIC | Age: 67
End: 2022-01-19
Payer: MEDICARE

## 2022-01-19 VITALS
HEART RATE: 64 BPM | WEIGHT: 181 LBS | TEMPERATURE: 97.6 F | BODY MASS INDEX: 27.43 KG/M2 | RESPIRATION RATE: 16 BRPM | HEIGHT: 68 IN | DIASTOLIC BLOOD PRESSURE: 85 MMHG | OXYGEN SATURATION: 99 % | SYSTOLIC BLOOD PRESSURE: 130 MMHG

## 2022-01-19 PROCEDURE — 99214 OFFICE O/P EST MOD 30 MIN: CPT

## 2022-02-01 ENCOUNTER — TRANSCRIPTION ENCOUNTER (OUTPATIENT)
Age: 67
End: 2022-02-01

## 2022-02-11 ENCOUNTER — APPOINTMENT (OUTPATIENT)
Dept: INTERVENTIONAL RADIOLOGY/VASCULAR | Facility: CLINIC | Age: 67
End: 2022-02-11
Payer: MEDICARE

## 2022-02-11 DIAGNOSIS — Z72.89 OTHER PROBLEMS RELATED TO LIFESTYLE: ICD-10-CM

## 2022-02-11 DIAGNOSIS — Z78.9 OTHER SPECIFIED HEALTH STATUS: ICD-10-CM

## 2022-02-11 PROCEDURE — 99204 OFFICE O/P NEW MOD 45 MIN: CPT | Mod: 95

## 2022-02-11 RX ORDER — ERGOCALCIFEROL (VITAMIN D2) 50 MCG
50 MCG CAPSULE ORAL
Refills: 0 | Status: ACTIVE | COMMUNITY
Start: 2022-02-11

## 2022-02-11 RX ORDER — ZINC SULFATE TAB 220 MG (50 MG ZINC EQUIVALENT) 220 (50 ZN) MG
220 (50 ZN) TAB ORAL
Refills: 0 | Status: ACTIVE | COMMUNITY
Start: 2022-02-11

## 2022-02-15 ENCOUNTER — APPOINTMENT (OUTPATIENT)
Dept: CV DIAGNOSITCS | Facility: HOSPITAL | Age: 67
End: 2022-02-15

## 2022-03-14 ENCOUNTER — APPOINTMENT (OUTPATIENT)
Dept: NUCLEAR MEDICINE | Facility: IMAGING CENTER | Age: 67
End: 2022-03-14
Payer: MEDICARE

## 2022-03-14 ENCOUNTER — OUTPATIENT (OUTPATIENT)
Dept: OUTPATIENT SERVICES | Facility: HOSPITAL | Age: 67
LOS: 1 days | End: 2022-03-14
Payer: COMMERCIAL

## 2022-03-14 ENCOUNTER — APPOINTMENT (OUTPATIENT)
Dept: CT IMAGING | Facility: IMAGING CENTER | Age: 67
End: 2022-03-14
Payer: MEDICARE

## 2022-03-14 DIAGNOSIS — C22.0 LIVER CELL CARCINOMA: ICD-10-CM

## 2022-03-14 DIAGNOSIS — Z00.8 ENCOUNTER FOR OTHER GENERAL EXAMINATION: ICD-10-CM

## 2022-03-14 PROCEDURE — 78306 BONE IMAGING WHOLE BODY: CPT | Mod: 26

## 2022-03-14 PROCEDURE — A9561: CPT

## 2022-03-14 PROCEDURE — 71250 CT THORAX DX C-: CPT | Mod: 26

## 2022-03-14 PROCEDURE — 71250 CT THORAX DX C-: CPT

## 2022-03-14 PROCEDURE — 78306 BONE IMAGING WHOLE BODY: CPT

## 2022-03-15 ENCOUNTER — NON-APPOINTMENT (OUTPATIENT)
Age: 67
End: 2022-03-15

## 2022-03-15 ENCOUNTER — APPOINTMENT (OUTPATIENT)
Dept: OPHTHALMOLOGY | Facility: CLINIC | Age: 67
End: 2022-03-15
Payer: MEDICARE

## 2022-03-15 PROCEDURE — 92014 COMPRE OPH EXAM EST PT 1/>: CPT

## 2022-03-15 PROCEDURE — 92250 FUNDUS PHOTOGRAPHY W/I&R: CPT

## 2022-03-17 ENCOUNTER — NON-APPOINTMENT (OUTPATIENT)
Age: 67
End: 2022-03-17

## 2022-04-04 ENCOUNTER — OUTPATIENT (OUTPATIENT)
Dept: OUTPATIENT SERVICES | Facility: HOSPITAL | Age: 67
LOS: 1 days | End: 2022-04-04
Payer: MEDICARE

## 2022-04-04 VITALS
WEIGHT: 177.91 LBS | OXYGEN SATURATION: 98 % | DIASTOLIC BLOOD PRESSURE: 68 MMHG | TEMPERATURE: 97 F | RESPIRATION RATE: 20 BRPM | HEART RATE: 76 BPM | HEIGHT: 68 IN | SYSTOLIC BLOOD PRESSURE: 100 MMHG

## 2022-04-04 DIAGNOSIS — K76.9 LIVER DISEASE, UNSPECIFIED: ICD-10-CM

## 2022-04-04 DIAGNOSIS — I10 ESSENTIAL (PRIMARY) HYPERTENSION: ICD-10-CM

## 2022-04-04 DIAGNOSIS — Z11.52 ENCOUNTER FOR SCREENING FOR COVID-19: ICD-10-CM

## 2022-04-04 DIAGNOSIS — Z01.818 ENCOUNTER FOR OTHER PREPROCEDURAL EXAMINATION: ICD-10-CM

## 2022-04-04 DIAGNOSIS — C22.0 LIVER CELL CARCINOMA: ICD-10-CM

## 2022-04-04 LAB
ALBUMIN SERPL ELPH-MCNC: 4.4 G/DL — SIGNIFICANT CHANGE UP (ref 3.3–5)
ALP SERPL-CCNC: 105 U/L — SIGNIFICANT CHANGE UP (ref 40–120)
ALT FLD-CCNC: 30 U/L — SIGNIFICANT CHANGE UP (ref 10–45)
ANION GAP SERPL CALC-SCNC: 12 MMOL/L — SIGNIFICANT CHANGE UP (ref 5–17)
APTT BLD: 27.8 SEC — SIGNIFICANT CHANGE UP (ref 27.5–35.5)
AST SERPL-CCNC: 26 U/L — SIGNIFICANT CHANGE UP (ref 10–40)
BILIRUB SERPL-MCNC: 0.3 MG/DL — SIGNIFICANT CHANGE UP (ref 0.2–1.2)
BLD GP AB SCN SERPL QL: NEGATIVE — SIGNIFICANT CHANGE UP
BUN SERPL-MCNC: 19 MG/DL — SIGNIFICANT CHANGE UP (ref 7–23)
CALCIUM SERPL-MCNC: 9.3 MG/DL — SIGNIFICANT CHANGE UP (ref 8.4–10.5)
CHLORIDE SERPL-SCNC: 99 MMOL/L — SIGNIFICANT CHANGE UP (ref 96–108)
CO2 SERPL-SCNC: 27 MMOL/L — SIGNIFICANT CHANGE UP (ref 22–31)
CREAT SERPL-MCNC: 1.2 MG/DL — SIGNIFICANT CHANGE UP (ref 0.5–1.3)
EGFR: 49 ML/MIN/1.73M2 — LOW
GLUCOSE SERPL-MCNC: 126 MG/DL — HIGH (ref 70–99)
HCT VFR BLD CALC: 39.2 % — SIGNIFICANT CHANGE UP (ref 34.5–45)
HGB BLD-MCNC: 12.7 G/DL — SIGNIFICANT CHANGE UP (ref 11.5–15.5)
INR BLD: 1.22 RATIO — HIGH (ref 0.88–1.16)
MCHC RBC-ENTMCNC: 30.9 PG — SIGNIFICANT CHANGE UP (ref 27–34)
MCHC RBC-ENTMCNC: 32.4 GM/DL — SIGNIFICANT CHANGE UP (ref 32–36)
MCV RBC AUTO: 95.4 FL — SIGNIFICANT CHANGE UP (ref 80–100)
NRBC # BLD: 0 /100 WBCS — SIGNIFICANT CHANGE UP (ref 0–0)
PLATELET # BLD AUTO: 129 K/UL — LOW (ref 150–400)
POTASSIUM SERPL-MCNC: 3.6 MMOL/L — SIGNIFICANT CHANGE UP (ref 3.5–5.3)
POTASSIUM SERPL-SCNC: 3.6 MMOL/L — SIGNIFICANT CHANGE UP (ref 3.5–5.3)
PROT SERPL-MCNC: 7.9 G/DL — SIGNIFICANT CHANGE UP (ref 6–8.3)
PROTHROM AB SERPL-ACNC: 14.1 SEC — HIGH (ref 10.5–13.4)
RBC # BLD: 4.11 M/UL — SIGNIFICANT CHANGE UP (ref 3.8–5.2)
RBC # FLD: 12.6 % — SIGNIFICANT CHANGE UP (ref 10.3–14.5)
RH IG SCN BLD-IMP: POSITIVE — SIGNIFICANT CHANGE UP
SARS-COV-2 RNA SPEC QL NAA+PROBE: SIGNIFICANT CHANGE UP
SODIUM SERPL-SCNC: 138 MMOL/L — SIGNIFICANT CHANGE UP (ref 135–145)
WBC # BLD: 6.36 K/UL — SIGNIFICANT CHANGE UP (ref 3.8–10.5)
WBC # FLD AUTO: 6.36 K/UL — SIGNIFICANT CHANGE UP (ref 3.8–10.5)

## 2022-04-04 PROCEDURE — 85610 PROTHROMBIN TIME: CPT

## 2022-04-04 PROCEDURE — 86850 RBC ANTIBODY SCREEN: CPT

## 2022-04-04 PROCEDURE — U0005: CPT

## 2022-04-04 PROCEDURE — 85730 THROMBOPLASTIN TIME PARTIAL: CPT

## 2022-04-04 PROCEDURE — 86901 BLOOD TYPING SEROLOGIC RH(D): CPT

## 2022-04-04 PROCEDURE — G0463: CPT

## 2022-04-04 PROCEDURE — 85027 COMPLETE CBC AUTOMATED: CPT

## 2022-04-04 PROCEDURE — 36415 COLL VENOUS BLD VENIPUNCTURE: CPT

## 2022-04-04 PROCEDURE — C9803: CPT

## 2022-04-04 PROCEDURE — 80053 COMPREHEN METABOLIC PANEL: CPT

## 2022-04-04 PROCEDURE — 86900 BLOOD TYPING SEROLOGIC ABO: CPT

## 2022-04-04 PROCEDURE — U0003: CPT

## 2022-04-04 NOTE — H&P PST ADULT - NSICDXPASTMEDICALHX_GEN_ALL_CORE_FT
PAST MEDICAL HISTORY:  Cervical arthritis     Cholelithiases     Chronic Pancreatitis     Cirrhosis     GERD (gastroesophageal reflux disease)     Hepatitis C attempted treatment in past and again in 2012 with incevac and ribavirin - succesful treatement    Hypertension     Peptic ulcer disease in past    Torn ACL right knee

## 2022-04-04 NOTE — H&P PST ADULT - FALL HARM RISK - UNIVERSAL INTERVENTIONS
Bed in lowest position, wheels locked, appropriate side rails in place/Call bell, personal items and telephone in reach/Instruct patient to call for assistance before getting out of bed or chair/Non-slip footwear when patient is out of bed/[place] to call system/Physically safe environment - no spills, clutter or unnecessary equipment/Purposeful Proactive Rounding/Room/bathroom lighting operational, light cord in reach

## 2022-04-04 NOTE — H&P PST ADULT - NSANTHOSAYNRD_GEN_A_CORE
No. FREYA screening performed.  STOP BANG Legend: 0-2 = LOW Risk; 3-4 = INTERMEDIATE Risk; 5-8 = HIGH Risk

## 2022-04-04 NOTE — H&P PST ADULT - HISTORY OF PRESENT ILLNESS
Denies Recent travel, Exposure or Covid symptoms  Covid test-    66 yr old female with PMH of HTN, GERD, Hepatitic C - Treated in 2013, Liver cirrhosis , with recent liver lesion in ultrasound & elevated alpha feta-protein & MRI showed 1.6 cm liver lesion. Now coming in for Mapping & perfusion scan anesthesia on 4/7/2022.     Denies Recent travel, Exposure or Covid symptoms  Covid test- 4/4/2022

## 2022-04-05 PROBLEM — K80.20 CALCULUS OF GALLBLADDER WITHOUT CHOLECYSTITIS WITHOUT OBSTRUCTION: Chronic | Status: ACTIVE | Noted: 2022-04-04

## 2022-04-05 PROBLEM — M47.812 SPONDYLOSIS WITHOUT MYELOPATHY OR RADICULOPATHY, CERVICAL REGION: Chronic | Status: ACTIVE | Noted: 2022-04-04

## 2022-04-06 ENCOUNTER — APPOINTMENT (OUTPATIENT)
Dept: MRI IMAGING | Facility: CLINIC | Age: 67
End: 2022-04-06
Payer: MEDICARE

## 2022-04-06 ENCOUNTER — OUTPATIENT (OUTPATIENT)
Dept: OUTPATIENT SERVICES | Facility: HOSPITAL | Age: 67
LOS: 1 days | End: 2022-04-06
Payer: MEDICARE

## 2022-04-06 DIAGNOSIS — Z00.8 ENCOUNTER FOR OTHER GENERAL EXAMINATION: ICD-10-CM

## 2022-04-06 PROBLEM — S83.519A SPRAIN OF ANTERIOR CRUCIATE LIGAMENT OF UNSPECIFIED KNEE, INITIAL ENCOUNTER: Chronic | Status: ACTIVE | Noted: 2022-04-04

## 2022-04-06 PROCEDURE — 74183 MRI ABD W/O CNTR FLWD CNTR: CPT | Mod: 26

## 2022-04-06 PROCEDURE — 74183 MRI ABD W/O CNTR FLWD CNTR: CPT

## 2022-04-06 PROCEDURE — A9585: CPT

## 2022-04-07 ENCOUNTER — APPOINTMENT (OUTPATIENT)
Dept: NUCLEAR MEDICINE | Facility: HOSPITAL | Age: 67
End: 2022-04-07

## 2022-04-07 ENCOUNTER — OUTPATIENT (OUTPATIENT)
Dept: OUTPATIENT SERVICES | Facility: HOSPITAL | Age: 67
LOS: 1 days | End: 2022-04-07
Payer: MEDICARE

## 2022-04-07 VITALS
SYSTOLIC BLOOD PRESSURE: 130 MMHG | DIASTOLIC BLOOD PRESSURE: 90 MMHG | TEMPERATURE: 99 F | HEIGHT: 68 IN | WEIGHT: 177.91 LBS | HEART RATE: 78 BPM | RESPIRATION RATE: 16 BRPM | OXYGEN SATURATION: 98 %

## 2022-04-07 DIAGNOSIS — C22.0 LIVER CELL CARCINOMA: ICD-10-CM

## 2022-04-07 LAB — SARS-COV-2 RNA SPEC QL NAA+PROBE: SIGNIFICANT CHANGE UP

## 2022-04-07 PROCEDURE — U0003: CPT

## 2022-04-07 NOTE — CHART NOTE - NSCHARTNOTEFT_GEN_A_CORE
Pt presenting with productive cough likely with the inability to lay flat for long duration of procedure; Procedure rescheduled to 4/17.

## 2022-04-07 NOTE — ASU PATIENT PROFILE, ADULT - FALL HARM RISK - UNIVERSAL INTERVENTIONS
Bed in lowest position, wheels locked, appropriate side rails in place/Call bell, personal items and telephone in reach/Instruct patient to call for assistance before getting out of bed or chair/Non-slip footwear when patient is out of bed/[place] to call system/Physically safe environment - no spills, clutter or unnecessary equipment/Purposeful Proactive Rounding/Room/bathroom lighting operational, light cord in reach

## 2022-04-07 NOTE — PRE PROCEDURE NOTE - PRE PROCEDURE EVALUATION
Interventional Radiology  HPI: 66 yr old female with PMH of HTN, GERD, Hepatitic C - Treated in 2013, Liver cirrhosis, with recent liver lesion in ultrasound & elevated alpha feta-protein & MRI showed 1.6 cm liver lesion. Now coming in for Mapping angiogram & perfusion scan.    Allergies: NDKA    Exam  General: No acute distress  Chest: Non labored breathing  Abdomen: Non-distended  Extremities: No swelling, warm    -WBC 6.36 / HgB 12.7 / Hct 39.2 / Plt 129  -Na 138 / Cl 99 / BUN 19 / Glucose 126  -K 3.6 / CO2 27 / Cr 1.20  -ALT 30 / Alk Phos 105 / T.Bili 0.3  -INR1.22    Imaging: Reviewed    Plan: 66y Female presents for mapping angiogram and perfusion scan  -Risks/Benefits/alternatives explained with the patient and witnessed informed consent obtained.

## 2022-04-07 NOTE — ASU PREOP CHECKLIST - HEIGHT IN INCHES
Anesthesia Post-op Note      Patient: Hawk Germain  ANESTHESIA:  General   ANESTHESIOLOGIST:  Anesthesiologist: Hawk Pardo MD      Vital Last Value   Temperature 35.8 °C (96.4 °F) (01/09/17 1033)   Pulse 102 (01/09/17 1033)   Respiratory 20 (01/09/17 1033)   Non-Invasive  Blood Pressure 146/79 (01/09/17 1033)   Arterial   Blood Pressure     Pulse Oximetry 96 % (01/09/17 1033)       Post-op vital signs: stable.  Level of Consciousness: participates in exam, answers questions appropriately, awake and oriented.  Respiratory: unassisted, spontaneous ventilation  Cardiovascular: stable and blood pressure at baseline  Hydration: unable to assess  Post-op pain: well controlled  Nausea: None  Airway patency: patent  Post-op assessment: Sufficiently recovered from acute administration of anesthesia effects and able to participate in evaluation., No apparent anesthetic complications., tolerated anesthesia well and no evidence of recall  Complications: None.    Comments:     
Order signed.  
Please advise.                 ----- Message from Bipin Terry sent at 1/29/2019  1:51 PM CST -----  Type:  Patient Requesting Referral    Who Called:  Wife/Genie  Does the patient already have the specialty appointment scheduled?:  Yes  If yes, what is the date of that appointment?:  02/08  Referral to What Specialty:  Opthamology  Reason for Referral:  Tearing of eye, irritation  Does the patient want the referral with a specific physician?:  Priscilla  [facility]  Patient Requesting a Call Back?:  Yes  Best Call Back Number:  [phone]  Additional Information:         
Referral sent for approval  
8

## 2022-04-07 NOTE — ASU DISCHARGE PLAN (ADULT/PEDIATRIC) - NS MD DC FALL RISK RISK
For information on Fall & Injury Prevention, visit: [email or web address] OR  [email or web address] OR  https://www.cdc.gov/steadi/patient.html

## 2022-04-07 NOTE — ASU DISCHARGE PLAN (ADULT/PEDIATRIC) - ASU DC SPECIAL INSTRUCTIONSFT
You had a Mapping Angiogram and Perfusion Scan.    You may experience Post Embolization Syndrome for the first 3-5 days which may include abdominal pain, flu-like symptoms, nausea, vomiting & a low grade fever (less then 101 F).  These are normal after your procedure and are due to post embolization syndrome.    - You may shower in 24 hours. No soaking or swimming until the site is completely healed.  - Keep the area covered and dry for the next 24 hours.  - Do not perform any heavy lifting for the next few days or until the site is healed.  - You may resume your normal diet.  - You may resume your normal medications however you should wait 48 hours before restarting aspirin, plavix, or blood thinners.  - It is normal to experience some pain over the site for the next few days. You may take apply ice to the area (20 minutes on, 20 minutes off) and take Tylenol for that pain. Do not take more frequently than every 6 hours and do not exceed more than 3000mg of Tylenol in a 24 hour period.    - You were given conscious sedation which may make you drowsy, therefore you need someone to stay with you until the morning following the procedure.  - Do not drive, engage in heavy lifting or strenuous activity, or drink any alcoholic beverages for the next 24 hours.   - You may resume normal activity in 24 hours.    Notify your primary physician and/or Interventional Radiology IMMEDIATELY if you experience any of the following       - Fever of 100.4F or 38C       - Chills or Rigors/ Shakes       - Swelling and/or Redness in the area around the biopsy site       - Worsening Pain       - Blood soaked bandages or worsening bleeding       - Lightheadedness and/or dizziness upon standing       - Chest Pain/ Tightness       - Shortness of Breath       - Difficulty walking    If you have a problem that you believe requires IMMEDIATE attention, please go to your NEAREST Emergency Room. If you believe your problem can safely wait until you speak to a physician, please call Interventional Radiology for any concerns.    During Normal Weekday Business Hours- You can contact the Interventional Radiology department during normal business hours via telephone.  During Evenings and Weekends- If you need to contact Interventional Radiology during off hours, do so by calling the hospital and requesting to be connected to the Interventional Radiologist on call.

## 2022-04-11 ENCOUNTER — OUTPATIENT (OUTPATIENT)
Dept: OUTPATIENT SERVICES | Facility: HOSPITAL | Age: 67
LOS: 1 days | End: 2022-04-11
Payer: MEDICARE

## 2022-04-11 ENCOUNTER — APPOINTMENT (OUTPATIENT)
Dept: CV DIAGNOSITCS | Facility: HOSPITAL | Age: 67
End: 2022-04-11

## 2022-04-11 ENCOUNTER — APPOINTMENT (OUTPATIENT)
Dept: CARDIOLOGY | Facility: CLINIC | Age: 67
End: 2022-04-11
Payer: MEDICARE

## 2022-04-11 ENCOUNTER — APPOINTMENT (OUTPATIENT)
Dept: CARDIOLOGY | Facility: CLINIC | Age: 67
End: 2022-04-11

## 2022-04-11 ENCOUNTER — NON-APPOINTMENT (OUTPATIENT)
Age: 67
End: 2022-04-11

## 2022-04-11 VITALS
HEART RATE: 60 BPM | BODY MASS INDEX: 26.52 KG/M2 | SYSTOLIC BLOOD PRESSURE: 145 MMHG | DIASTOLIC BLOOD PRESSURE: 88 MMHG | HEIGHT: 68 IN | WEIGHT: 175 LBS | OXYGEN SATURATION: 96 %

## 2022-04-11 VITALS — DIASTOLIC BLOOD PRESSURE: 70 MMHG | SYSTOLIC BLOOD PRESSURE: 120 MMHG

## 2022-04-11 DIAGNOSIS — I10 ESSENTIAL (PRIMARY) HYPERTENSION: ICD-10-CM

## 2022-04-11 DIAGNOSIS — Z11.52 ENCOUNTER FOR SCREENING FOR COVID-19: ICD-10-CM

## 2022-04-11 LAB — SARS-COV-2 RNA SPEC QL NAA+PROBE: SIGNIFICANT CHANGE UP

## 2022-04-11 PROCEDURE — 93000 ELECTROCARDIOGRAM COMPLETE: CPT

## 2022-04-11 PROCEDURE — 93306 TTE W/DOPPLER COMPLETE: CPT | Mod: 26

## 2022-04-11 PROCEDURE — 93306 TTE W/DOPPLER COMPLETE: CPT

## 2022-04-11 PROCEDURE — 99214 OFFICE O/P EST MOD 30 MIN: CPT

## 2022-04-11 NOTE — DISCUSSION/SUMMARY
[FreeTextEntry1] : 66 year old female with history of hypertension, hyperlipidemia and a fatty liver with  cirrhosis secondary to hepatitis C.\par \par #Hypertension\par   Blood pressure is within reasonable limits\par   Continue on Amlodipine 5 mg QD\par   Continue on Avalide 150/ 12.5 mg QD\par Encouraged patient to continue healthy exercise and eating habits, focusing on a Mediterranean style of eating and aiming for the recommended 150 minutes per week of moderate physical activity.\par \par #Hyperlipidemia\par   Will drawn a fasting lipid panel to assess current cholesterol status\par \par #Cirrhosis/ Hx of Hepatitis C\par   Completed treated in 2014\par   Followed by Dr. Zac Cherry\par \par #Adverse Cardiovascular Risk Factors\par   Personal history of HTN./ HLD\par   Plan to drawn full fasting blood work panel: CBC, CMP, Hgb A1C, TSH, Lipid panel\par Had TTE done this am - results pendiing. \par \par FU 6 months

## 2022-04-11 NOTE — HISTORY OF PRESENT ILLNESS
[FreeTextEntry1] : 66 year old female with history of hypertension, hyperlipidemia and a fatty liver with  cirrhosis secondary to hepatitis C since 2001 now presumed to have hepatocellular carcinoma.  ( 1.6 cm lesion liver ).  \par She is currently down with a cold and congestion and cough-  recent COVID test - negative ) \par \par Scheduled for Liver angiogram 4/14/22\par  4/21/22 - Radioembolization of the liver tumor \par Had TTE done this am - results pending \par Denies chest pain, shortness of breath, dizziness, lightheadedness, palpitations or near syncope or syncope, orthopnea, PND and increasing lower extremity edema.

## 2022-04-14 ENCOUNTER — RESULT REVIEW (OUTPATIENT)
Age: 67
End: 2022-04-14

## 2022-04-14 ENCOUNTER — APPOINTMENT (OUTPATIENT)
Dept: NUCLEAR MEDICINE | Facility: HOSPITAL | Age: 67
End: 2022-04-14

## 2022-04-14 ENCOUNTER — OUTPATIENT (OUTPATIENT)
Dept: OUTPATIENT SERVICES | Facility: HOSPITAL | Age: 67
LOS: 1 days | End: 2022-04-14
Payer: MEDICARE

## 2022-04-14 ENCOUNTER — TRANSCRIPTION ENCOUNTER (OUTPATIENT)
Age: 67
End: 2022-04-14

## 2022-04-14 VITALS
SYSTOLIC BLOOD PRESSURE: 134 MMHG | HEART RATE: 63 BPM | DIASTOLIC BLOOD PRESSURE: 73 MMHG | TEMPERATURE: 98 F | RESPIRATION RATE: 18 BRPM | OXYGEN SATURATION: 100 %

## 2022-04-14 DIAGNOSIS — C22.0 LIVER CELL CARCINOMA: ICD-10-CM

## 2022-04-14 PROCEDURE — 78830 RP LOCLZJ TUM SPECT W/CT 1: CPT | Mod: 26,MH

## 2022-04-14 PROCEDURE — 36245 INS CATH ABD/L-EXT ART 1ST: CPT | Mod: XS

## 2022-04-14 PROCEDURE — 76380 CAT SCAN FOLLOW-UP STUDY: CPT | Mod: 26

## 2022-04-14 PROCEDURE — C9803: CPT

## 2022-04-14 PROCEDURE — 36247 INS CATH ABD/L-EXT ART 3RD: CPT

## 2022-04-14 PROCEDURE — 36248 INS CATH ABD/L-EXT ART ADDL: CPT | Mod: 59

## 2022-04-14 PROCEDURE — 76937 US GUIDE VASCULAR ACCESS: CPT | Mod: 26

## 2022-04-14 PROCEDURE — U0003: CPT

## 2022-04-14 PROCEDURE — 75726 ARTERY X-RAYS ABDOMEN: CPT | Mod: 26,59

## 2022-04-14 PROCEDURE — 75774 ARTERY X-RAY EACH VESSEL: CPT | Mod: 26

## 2022-04-14 PROCEDURE — U0005: CPT

## 2022-04-14 NOTE — ASU DISCHARGE PLAN (ADULT/PEDIATRIC) - NS MD DC FALL RISK RISK
For information on Fall & Injury Prevention, visit: [email or web address] OR  [email or web address] OR  https://www.cdc.gov/steadi/patient.html

## 2022-04-14 NOTE — ASU PATIENT PROFILE, ADULT - TOBACCO USE
Discharge Note:      All discharge instructions given at this time as well as all patient belongings returned to patient. Pt denies any further questions regarding discharge at this time. Pt given also given  discharge instructions/restrictions and medication handouts regarding all discharge medications and side effects. Pt denies any further issues at this time. Pt wheeled out to front discharge doors at this time. Pt left premises without any issues in private vehicle at this time. Never smoker

## 2022-04-14 NOTE — ASU DISCHARGE PLAN (ADULT/PEDIATRIC) - NURSING INSTRUCTIONS
Please feel free to contact us at [phone] if any problems arise. After 6PM, Monday through Friday, on weekends and on holidays, please call [phone] and ask for the radiology resident on call to be paged.

## 2022-04-14 NOTE — PRE PROCEDURE NOTE - PRE PROCEDURE EVALUATION
Interventional Radiology    HPI: 66y Female with HCC with new 1.6cm right hepatic lobe lesion who presents to IR for mapping angiogram and perfusion study in preparation for radio embolization. Patient seen in out pt consult with Dr. Olmedo on 2/11/22. Consult note reviewed in allscripts. Patient originally scheduled for procedure on 4/7 but was rescheduled 2/2 new cough and inability to lay flat. Cough resolved, patient feeling better.     Allergies: NKDA  Medications (Abx/Cardiac/Anticoagulation/Blood Products)      Data:    T(C): 36.9  HR: 63  BP: 134/73  RR: 18  SpO2: 100%    Labs: reviewed in chart from 4/4.   Exam  General: No acute distress  Chest: Non labored breathing    Plan: 66y Female presents for mapping angiogram and perfusion study  -Risks/Benefits/alternatives explained with the patient and/or healthcare proxy and witnessed informed consent obtained.

## 2022-04-14 NOTE — ASU DISCHARGE PLAN (ADULT/PEDIATRIC) - ASU DC SPECIAL INSTRUCTIONSFT
You had a mapping angiogram in Interventional Radiology (IR) on 4/14/22 with Dr. Olmedo.     - You may shower in 24 hours. No soaking or swimming until the site is completely healed.  - Keep the area covered and dry for the next 24 hours.  - Do not perform any heavy lifting for the next few days or until the site is healed.  - You may resume your normal diet.  - You may resume your normal medications however you should wait 48 hours before restarting aspirin, plavix, or blood thinners.  - It is normal to experience some pain over the site for the next few days. You may take apply ice to the area (20 minutes on, 20 minutes off) and take Tylenol for that pain. Do not take more frequently than every 6 hours and do not exceed more than 3000mg of Tylenol in a 24 hour period.    - You were given conscious sedation which may make you drowsy, therefore you need someone to stay with you until the morning following the procedure.  - Do not drive, engage in heavy lifting or strenuous activity, or drink any alcoholic beverages for the next 24 hours.   - You may resume normal activity in 24 hours.    Notify your primary physician and/or Interventional Radiology IMMEDIATELY if you experience any of the following       - Fever of 100.4F or 38C       - Chills or Rigors/ Shakes       - Swelling and/or Redness in the area around the biopsy site       - Worsening Pain       - Blood soaked bandages or worsening bleeding       - Lightheadedness and/or dizziness upon standing       - Chest Pain/ Tightness       - Shortness of Breath       - Difficulty walking    If you have a problem that you believe requires IMMEDIATE attention, please go to your NEAREST Emergency Room. If you believe your problem can safely wait until you speak to a physician, please call Interventional Radiology for any concerns.    Please feel free to contact us at [phone] if any problems arise. After 6PM, Monday through Friday, on weekends and on holidays, please call [phone] and ask for the radiology resident on call to be paged.

## 2022-04-14 NOTE — ASU PATIENT PROFILE, ADULT - FALL HARM RISK - UNIVERSAL INTERVENTIONS
Bed in lowest position, wheels locked, appropriate side rails in place/Call bell, personal items and telephone in reach/Instruct patient to call for assistance before getting out of bed or chair/Non-slip footwear when patient is out of bed/[place] to call system/Physically safe environment - no spills, clutter or unnecessary equipment/Purposeful Proactive Rounding/Room/bathroom lighting operational, light cord in reach

## 2022-04-18 ENCOUNTER — OUTPATIENT (OUTPATIENT)
Dept: OUTPATIENT SERVICES | Facility: HOSPITAL | Age: 67
LOS: 1 days | End: 2022-04-18
Payer: MEDICARE

## 2022-04-18 DIAGNOSIS — Z11.52 ENCOUNTER FOR SCREENING FOR COVID-19: ICD-10-CM

## 2022-04-18 LAB — SARS-COV-2 RNA SPEC QL NAA+PROBE: SIGNIFICANT CHANGE UP

## 2022-04-18 PROCEDURE — C9803: CPT

## 2022-04-18 PROCEDURE — U0003: CPT

## 2022-04-18 PROCEDURE — U0005: CPT

## 2022-04-19 ENCOUNTER — APPOINTMENT (OUTPATIENT)
Dept: OPHTHALMOLOGY | Facility: CLINIC | Age: 67
End: 2022-04-19
Payer: MEDICARE

## 2022-04-19 ENCOUNTER — NON-APPOINTMENT (OUTPATIENT)
Age: 67
End: 2022-04-19

## 2022-04-19 DIAGNOSIS — Z01.818 ENCOUNTER FOR OTHER PREPROCEDURAL EXAMINATION: ICD-10-CM

## 2022-04-19 PROCEDURE — 92012 INTRM OPH EXAM EST PATIENT: CPT

## 2022-04-21 ENCOUNTER — RESULT REVIEW (OUTPATIENT)
Age: 67
End: 2022-04-21

## 2022-04-21 ENCOUNTER — TRANSCRIPTION ENCOUNTER (OUTPATIENT)
Age: 67
End: 2022-04-21

## 2022-04-21 ENCOUNTER — OUTPATIENT (OUTPATIENT)
Dept: OUTPATIENT SERVICES | Facility: HOSPITAL | Age: 67
LOS: 1 days | End: 2022-04-21
Payer: MEDICARE

## 2022-04-21 VITALS
WEIGHT: 176.37 LBS | HEIGHT: 68 IN | OXYGEN SATURATION: 100 % | SYSTOLIC BLOOD PRESSURE: 137 MMHG | DIASTOLIC BLOOD PRESSURE: 89 MMHG | RESPIRATION RATE: 16 BRPM | TEMPERATURE: 98 F | HEART RATE: 61 BPM

## 2022-04-21 VITALS
DIASTOLIC BLOOD PRESSURE: 89 MMHG | SYSTOLIC BLOOD PRESSURE: 132 MMHG | RESPIRATION RATE: 18 BRPM | OXYGEN SATURATION: 100 % | HEART RATE: 68 BPM

## 2022-04-21 DIAGNOSIS — C22.0 LIVER CELL CARCINOMA: ICD-10-CM

## 2022-04-21 LAB
ALBUMIN SERPL ELPH-MCNC: 4.7 G/DL — SIGNIFICANT CHANGE UP (ref 3.3–5)
ALBUMIN SERPL ELPH-MCNC: 4.7 G/DL — SIGNIFICANT CHANGE UP (ref 3.3–5)
ALP SERPL-CCNC: 96 U/L — SIGNIFICANT CHANGE UP (ref 40–120)
ALP SERPL-CCNC: 97 U/L — SIGNIFICANT CHANGE UP (ref 40–120)
ALT FLD-CCNC: 24 U/L — SIGNIFICANT CHANGE UP (ref 10–45)
ALT FLD-CCNC: 25 U/L — SIGNIFICANT CHANGE UP (ref 10–45)
ANION GAP SERPL CALC-SCNC: 13 MMOL/L — SIGNIFICANT CHANGE UP (ref 5–17)
ANION GAP SERPL CALC-SCNC: 13 MMOL/L — SIGNIFICANT CHANGE UP (ref 5–17)
APTT BLD: 30.1 SEC — SIGNIFICANT CHANGE UP (ref 27.5–35.5)
AST SERPL-CCNC: 25 U/L — SIGNIFICANT CHANGE UP (ref 10–40)
AST SERPL-CCNC: 25 U/L — SIGNIFICANT CHANGE UP (ref 10–40)
BASOPHILS # BLD AUTO: 0.03 K/UL — SIGNIFICANT CHANGE UP (ref 0–0.2)
BASOPHILS NFR BLD AUTO: 0.7 % — SIGNIFICANT CHANGE UP (ref 0–2)
BILIRUB SERPL-MCNC: 0.7 MG/DL — SIGNIFICANT CHANGE UP (ref 0.2–1.2)
BILIRUB SERPL-MCNC: 0.8 MG/DL — SIGNIFICANT CHANGE UP (ref 0.2–1.2)
BUN SERPL-MCNC: 15 MG/DL — SIGNIFICANT CHANGE UP (ref 7–23)
BUN SERPL-MCNC: 15 MG/DL — SIGNIFICANT CHANGE UP (ref 7–23)
CALCIUM SERPL-MCNC: 9.6 MG/DL — SIGNIFICANT CHANGE UP (ref 8.4–10.5)
CALCIUM SERPL-MCNC: 9.6 MG/DL — SIGNIFICANT CHANGE UP (ref 8.4–10.5)
CHLORIDE SERPL-SCNC: 100 MMOL/L — SIGNIFICANT CHANGE UP (ref 96–108)
CHLORIDE SERPL-SCNC: 103 MMOL/L — SIGNIFICANT CHANGE UP (ref 96–108)
CO2 SERPL-SCNC: 26 MMOL/L — SIGNIFICANT CHANGE UP (ref 22–31)
CO2 SERPL-SCNC: 28 MMOL/L — SIGNIFICANT CHANGE UP (ref 22–31)
CREAT SERPL-MCNC: 0.86 MG/DL — SIGNIFICANT CHANGE UP (ref 0.5–1.3)
CREAT SERPL-MCNC: 0.87 MG/DL — SIGNIFICANT CHANGE UP (ref 0.5–1.3)
EGFR: 73 ML/MIN/1.73M2 — SIGNIFICANT CHANGE UP
EGFR: 74 ML/MIN/1.73M2 — SIGNIFICANT CHANGE UP
EOSINOPHIL # BLD AUTO: 0.11 K/UL — SIGNIFICANT CHANGE UP (ref 0–0.5)
EOSINOPHIL NFR BLD AUTO: 2.5 % — SIGNIFICANT CHANGE UP (ref 0–6)
GLUCOSE SERPL-MCNC: 90 MG/DL — SIGNIFICANT CHANGE UP (ref 70–99)
GLUCOSE SERPL-MCNC: 92 MG/DL — SIGNIFICANT CHANGE UP (ref 70–99)
HCT VFR BLD CALC: 40.3 % — SIGNIFICANT CHANGE UP (ref 34.5–45)
HCT VFR BLD CALC: 41 % — SIGNIFICANT CHANGE UP (ref 34.5–45)
HGB BLD-MCNC: 13.5 G/DL — SIGNIFICANT CHANGE UP (ref 11.5–15.5)
HGB BLD-MCNC: 13.5 G/DL — SIGNIFICANT CHANGE UP (ref 11.5–15.5)
IMM GRANULOCYTES NFR BLD AUTO: 0.5 % — SIGNIFICANT CHANGE UP (ref 0–1.5)
INR BLD: 1.18 RATIO — HIGH (ref 0.88–1.16)
INR BLD: 1.18 RATIO — HIGH (ref 0.88–1.16)
LYMPHOCYTES # BLD AUTO: 1.62 K/UL — SIGNIFICANT CHANGE UP (ref 1–3.3)
LYMPHOCYTES # BLD AUTO: 37 % — SIGNIFICANT CHANGE UP (ref 13–44)
MCHC RBC-ENTMCNC: 31.7 PG — SIGNIFICANT CHANGE UP (ref 27–34)
MCHC RBC-ENTMCNC: 31.8 PG — SIGNIFICANT CHANGE UP (ref 27–34)
MCHC RBC-ENTMCNC: 32.9 GM/DL — SIGNIFICANT CHANGE UP (ref 32–36)
MCHC RBC-ENTMCNC: 33.5 GM/DL — SIGNIFICANT CHANGE UP (ref 32–36)
MCV RBC AUTO: 94.8 FL — SIGNIFICANT CHANGE UP (ref 80–100)
MCV RBC AUTO: 96.2 FL — SIGNIFICANT CHANGE UP (ref 80–100)
MONOCYTES # BLD AUTO: 0.48 K/UL — SIGNIFICANT CHANGE UP (ref 0–0.9)
MONOCYTES NFR BLD AUTO: 11 % — SIGNIFICANT CHANGE UP (ref 2–14)
NEUTROPHILS # BLD AUTO: 2.12 K/UL — SIGNIFICANT CHANGE UP (ref 1.8–7.4)
NEUTROPHILS NFR BLD AUTO: 48.3 % — SIGNIFICANT CHANGE UP (ref 43–77)
NRBC # BLD: 0 /100 WBCS — SIGNIFICANT CHANGE UP (ref 0–0)
NRBC # BLD: 0 /100 WBCS — SIGNIFICANT CHANGE UP (ref 0–0)
PLATELET # BLD AUTO: 162 K/UL — SIGNIFICANT CHANGE UP (ref 150–400)
PLATELET # BLD AUTO: 178 K/UL — SIGNIFICANT CHANGE UP (ref 150–400)
POTASSIUM SERPL-MCNC: 3.5 MMOL/L — SIGNIFICANT CHANGE UP (ref 3.5–5.3)
POTASSIUM SERPL-MCNC: 4.2 MMOL/L — SIGNIFICANT CHANGE UP (ref 3.5–5.3)
POTASSIUM SERPL-SCNC: 3.5 MMOL/L — SIGNIFICANT CHANGE UP (ref 3.5–5.3)
POTASSIUM SERPL-SCNC: 4.2 MMOL/L — SIGNIFICANT CHANGE UP (ref 3.5–5.3)
PROT SERPL-MCNC: 8.1 G/DL — SIGNIFICANT CHANGE UP (ref 6–8.3)
PROT SERPL-MCNC: 8.2 G/DL — SIGNIFICANT CHANGE UP (ref 6–8.3)
PROTHROM AB SERPL-ACNC: 13.7 SEC — HIGH (ref 10.5–13.4)
PROTHROM AB SERPL-ACNC: 13.7 SEC — HIGH (ref 10.5–13.4)
RBC # BLD: 4.25 M/UL — SIGNIFICANT CHANGE UP (ref 3.8–5.2)
RBC # BLD: 4.26 M/UL — SIGNIFICANT CHANGE UP (ref 3.8–5.2)
RBC # FLD: 12.6 % — SIGNIFICANT CHANGE UP (ref 10.3–14.5)
RBC # FLD: 12.6 % — SIGNIFICANT CHANGE UP (ref 10.3–14.5)
SODIUM SERPL-SCNC: 139 MMOL/L — SIGNIFICANT CHANGE UP (ref 135–145)
SODIUM SERPL-SCNC: 144 MMOL/L — SIGNIFICANT CHANGE UP (ref 135–145)
WBC # BLD: 4.32 K/UL — SIGNIFICANT CHANGE UP (ref 3.8–10.5)
WBC # BLD: 4.38 K/UL — SIGNIFICANT CHANGE UP (ref 3.8–10.5)
WBC # FLD AUTO: 4.32 K/UL — SIGNIFICANT CHANGE UP (ref 3.8–10.5)
WBC # FLD AUTO: 4.38 K/UL — SIGNIFICANT CHANGE UP (ref 3.8–10.5)

## 2022-04-21 PROCEDURE — 36247 INS CATH ABD/L-EXT ART 3RD: CPT

## 2022-04-21 PROCEDURE — 85025 COMPLETE CBC W/AUTO DIFF WBC: CPT

## 2022-04-21 PROCEDURE — 36245 INS CATH ABD/L-EXT ART 1ST: CPT | Mod: XS

## 2022-04-21 PROCEDURE — C1894: CPT

## 2022-04-21 PROCEDURE — 75774 ARTERY X-RAY EACH VESSEL: CPT

## 2022-04-21 PROCEDURE — 76380 CAT SCAN FOLLOW-UP STUDY: CPT

## 2022-04-21 PROCEDURE — C1769: CPT

## 2022-04-21 PROCEDURE — 80053 COMPREHEN METABOLIC PANEL: CPT

## 2022-04-21 PROCEDURE — C1760: CPT

## 2022-04-21 PROCEDURE — 75726 ARTERY X-RAYS ABDOMEN: CPT | Mod: 59

## 2022-04-21 PROCEDURE — 79445 NUCLEAR RX INTRA-ARTERIAL: CPT

## 2022-04-21 PROCEDURE — 37243 VASC EMBOLIZE/OCCLUDE ORGAN: CPT

## 2022-04-21 PROCEDURE — C1887: CPT

## 2022-04-21 PROCEDURE — 85027 COMPLETE CBC AUTOMATED: CPT

## 2022-04-21 PROCEDURE — C2616: CPT

## 2022-04-21 PROCEDURE — 85730 THROMBOPLASTIN TIME PARTIAL: CPT

## 2022-04-21 PROCEDURE — 36248 INS CATH ABD/L-EXT ART ADDL: CPT | Mod: 59

## 2022-04-21 PROCEDURE — 79445 NUCLEAR RX INTRA-ARTERIAL: CPT | Mod: 26

## 2022-04-21 PROCEDURE — 76937 US GUIDE VASCULAR ACCESS: CPT | Mod: 26

## 2022-04-21 PROCEDURE — 36415 COLL VENOUS BLD VENIPUNCTURE: CPT

## 2022-04-21 PROCEDURE — 78830 RP LOCLZJ TUM SPECT W/CT 1: CPT | Mod: MH

## 2022-04-21 PROCEDURE — 36248 INS CATH ABD/L-EXT ART ADDL: CPT

## 2022-04-21 PROCEDURE — 76380 CAT SCAN FOLLOW-UP STUDY: CPT | Mod: 26,59

## 2022-04-21 PROCEDURE — 85610 PROTHROMBIN TIME: CPT

## 2022-04-21 PROCEDURE — 76937 US GUIDE VASCULAR ACCESS: CPT

## 2022-04-21 RX ORDER — OMEPRAZOLE 10 MG/1
1 CAPSULE, DELAYED RELEASE ORAL
Qty: 0 | Refills: 0 | DISCHARGE

## 2022-04-21 RX ORDER — KETOCONAZOLE 20 MG/G
1 AEROSOL, FOAM TOPICAL
Qty: 0 | Refills: 0 | DISCHARGE

## 2022-04-21 NOTE — ASU DISCHARGE PLAN (ADULT/PEDIATRIC) - NS MD DC FALL RISK RISK
For information on Fall & Injury Prevention, visit: [email or web address] OR  [email or web address] OR  https://www.cdc.gov/steadi/patient.html

## 2022-04-21 NOTE — ASU DISCHARGE PLAN (ADULT/PEDIATRIC) - NURSING INSTRUCTIONS
Please feel free to contact us at [phone] if any problems arise. After 6PM, Monday through Friday, on weekends and on holidays, please call [phone] and ask for the radiology resident on call to be paged.

## 2022-04-21 NOTE — ASU DISCHARGE PLAN (ADULT/PEDIATRIC) - PROVIDER TOKENS
PROVIDER:[TOKEN:[[id number]],FOLLOWUP:[1 month]] PROVIDER:[TOKEN:[[id number]],FOLLOWUP:[1 month]],PROVIDER:[TOKEN:[[id number]],FOLLOWUP:[2 weeks]]

## 2022-04-21 NOTE — PROGRESS NOTE ADULT - SUBJECTIVE AND OBJECTIVE BOX
Interventional Radiology Pre-Procedure Note    This is a 66y Female HCC     Procedure: S7 Y90    Diagnosis/Indication: Patient is a 66y old  Female who presents with a chief complaint of HCC    PAST MEDICAL & SURGICAL HISTORY:  Hypertension    Hepatitis C  attempted treatment in past and again in 2012 with incevac and ribavirin - succesful treatement    Cirrhosis    Chronic Pancreatitis    Peptic ulcer disease  in past    GERD (gastroesophageal reflux disease)    Cervical arthritis    Torn ACL  right knee    Cholelithiases    H/O: Hysterectomy  h/o Fibroids, 1998    Allergies: aspirin (Other)  No Known Allergies    LABS:  CBC Full  -  ( 21 Apr 2022 10:51 )  WBC Count : 4.32 K/uL  RBC Count : 4.25 M/uL  Hemoglobin : 13.5 g/dL  Hematocrit : 40.3 %  Platelet Count - Automated : 178 K/uL  Mean Cell Volume : 94.8 fl  Mean Cell Hemoglobin : 31.8 pg  Mean Cell Hemoglobin Concentration : 33.5 gm/dL    04-21    139  |  100  |  15  ----------------------------<  90  3.5   |  26  |  0.87    Ca    9.6      21 Apr 2022 10:51    TPro  8.2  /  Alb  4.7  /  TBili  0.8  /  DBili  x   /  AST  25  /  ALT  25  /  AlkPhos  97  04-21    PT/INR - ( 21 Apr 2022 10:51 )   PT: 13.7 sec;   INR: 1.18 ratio       PTT - ( 21 Apr 2022 10:51 )  PTT:30.1 sec    Procedure/ risks/ benefits/ alternatives were explained, informed consent obtained from patient, verbalizes understanding.     Plan: HCC Y90 radioembolization

## 2022-04-21 NOTE — ASU PATIENT PROFILE, ADULT - FALL HARM RISK - UNIVERSAL INTERVENTIONS
Bed in lowest position, wheels locked, appropriate side rails in place/Call bell, personal items and telephone in reach/Instruct patient to call for assistance before getting out of bed or chair/Non-slip footwear when patient is out of bed/[place] to call system/Physically safe environment - no spills, clutter or unnecessary equipment/Purposeful Proactive Rounding/Room/bathroom lighting operational, light cord in reach

## 2022-04-21 NOTE — ASU DISCHARGE PLAN (ADULT/PEDIATRIC) - CARE PROVIDER_API CALL
Franklin Olmedo (MD)  Diagnostic Radiology  [address] [place] [address], [address] [place] [address]  Phone: [phone]  Fax: [phone]  Follow Up Time: 1 month   Franklin Olmedo)  Diagnostic Radiology  [address] [facility], [address] [place] [address]  Phone: [phone]  Fax: [phone]  Follow Up Time: 1 month    Zac Cherry)  Gastroenterology  [address] [place] [address]  Phone: [phone]  Fax: [phone]  Follow Up Time: 2 weeks

## 2022-04-21 NOTE — ASU DISCHARGE PLAN (ADULT/PEDIATRIC) - ASU DC SPECIAL INSTRUCTIONSFT
Post S[facility] Instructions    - You underwent a radioembolization to treat your liver tumor (s) on 4/21/22 by Dr. Olmedo.     - Monitor right groin site for symptoms of bleeding, hard area underneath the skin, bruising, numbness, intense pain, or inability to move.  If you have any of these symptoms, contact your doctor and seek immediate medical attention    - You may have mild abdominal pain, nausea, loss of appetite, fatigue, and/or low grade fever.  These are normal after your procedure and they should resolve within 3-5 days.  Please call Interventional Radiology if you have a fever > 102.0 F.  If you have persistent nausea, contact IR and we can prescribe anti-nausea medication.     - Please report chills, temperature > 101.0, persistent nausea or vomiting, severe pain, confusion, yellowing of the skin, or abdominal swelling.    - Please refer to the "Radiation Safety Instructions" that were provided.  Please adhere to them for the 72 hours after your procedure.     - Start Medrol dose pack the day after your S[facility] procedure (unless otherwise instructed).    - Continue your PPI (Nexium, Prilosec, Protonix) for 30 days post procedure.    - A follow up phone call will be performed the day after the procedure.     - Blood work is to be performed 2 weeks after your procedure (you will be given a paper prescription).  You can locate the closest [facility] lab by calling [phone]. If you have labwork performed at a NON-[facility] lab, please request that the results be faxed to [phone].      - Please call the IR booking department at [phone] to schedule a follow up visit with Dr. Olmedo in 1 month.     - Follow up with your hepatologist or oncologist in 2 weeks.     - Post procedure imaging study is to be performed 2 months post procedure (CT or MRI).  You will be given a prescription for this at your initial 1 month follow up visit. If needed, our booking office will obtain authorization from your insurance company.    - Please contact the Nurse Practitioner with any questions or concerns directly at [phone] from the hours of 8 am to 6 pm, [facility].  After 6 pm and on weekends, please call [phone] and ask to speak with the "Radiology Resident on-call".  When you speak with the resident, let them know what procedure you had and they will get in touch with the Interventional Radiology physician that is on-call.           Radiation Safety Discharge Information for Patients Receiving Y-90 Microsphere Treatment    Patients can resume normal contact with adult family members.  Patients should follow the below radiation safety instructions for 72 hours following Yttrium-90 (Y-90) microsphere treatment.   - Patients should follow good personal hygiene such as thorough hand washing after using the toilet, sitting on the toilet when urinating, cleaning up any spills of body fluids such as blood, urine, or stool and disposing of them in the toilet.    - For the next 72 hours (3 days), avoid prolonged close contact with small children or pregnant individuals (less than 3 feet away).  - If you have to see a physician or go to the Emergency Room within 72 hours (3 days) following your treatment, inform them of your Y-90 treatment and that there is a small amount of radioactive material in your liver.  Treatment should not be delayed based on the small amount of radioactviity in your liver.  If there are any questions, medical personnel should contact the IR Nurse Practitioner at [phone].  - You may wish to carry these instructions with you for the first 3 days following your treatment.  After that time, they can be discarded.   - If you have any questions please contact the Radiation Safety Office at [phone].

## 2022-04-21 NOTE — PROCEDURE NOTE - PROCEDURE FINDINGS AND DETAILS
S8 and S7 vessels separately catheterized and radioembolized with 2 separate doses to treat a 1.6cm tumor.

## 2022-04-22 ENCOUNTER — APPOINTMENT (OUTPATIENT)
Dept: INTERVENTIONAL RADIOLOGY/VASCULAR | Facility: CLINIC | Age: 67
End: 2022-04-22

## 2022-04-22 ENCOUNTER — NON-APPOINTMENT (OUTPATIENT)
Age: 67
End: 2022-04-22

## 2022-04-22 PROCEDURE — G2012 BRIEF CHECK IN BY MD/QHP: CPT

## 2022-04-26 ENCOUNTER — TRANSCRIPTION ENCOUNTER (OUTPATIENT)
Age: 67
End: 2022-04-26

## 2022-04-27 ENCOUNTER — TRANSCRIPTION ENCOUNTER (OUTPATIENT)
Age: 67
End: 2022-04-27

## 2022-04-27 DIAGNOSIS — C22.0 LIVER CELL CARCINOMA: ICD-10-CM

## 2022-05-10 ENCOUNTER — APPOINTMENT (OUTPATIENT)
Dept: HEPATOLOGY | Facility: CLINIC | Age: 67
End: 2022-05-10
Payer: MEDICARE

## 2022-05-10 ENCOUNTER — LABORATORY RESULT (OUTPATIENT)
Age: 67
End: 2022-05-10

## 2022-05-10 VITALS
TEMPERATURE: 97.1 F | BODY MASS INDEX: 26.76 KG/M2 | OXYGEN SATURATION: 99 % | HEART RATE: 70 BPM | HEIGHT: 68 IN | DIASTOLIC BLOOD PRESSURE: 70 MMHG | SYSTOLIC BLOOD PRESSURE: 108 MMHG | WEIGHT: 176.6 LBS

## 2022-05-10 PROCEDURE — 99214 OFFICE O/P EST MOD 30 MIN: CPT

## 2022-05-10 RX ORDER — ASCORBIC ACID 500 MG
TABLET ORAL
Refills: 0 | Status: ACTIVE | COMMUNITY

## 2022-05-10 RX ORDER — MAGNESIUM OXIDE/MAG AA CHELATE 300 MG
CAPSULE ORAL
Refills: 0 | Status: ACTIVE | COMMUNITY

## 2022-05-10 RX ORDER — METHYLPREDNISOLONE 4 MG/1
4 TABLET ORAL
Qty: 1 | Refills: 0 | Status: DISCONTINUED | COMMUNITY
Start: 2022-02-11 | End: 2022-05-10

## 2022-05-10 NOTE — HISTORY OF PRESENT ILLNESS
[de-identified] : Ms. Rowe comes in today for followup. She has fatty liver and cirrhosis secondary to hepatitis C recently developed hepatocellular carcinoma. She completed treatment for hepatitis C with Pegasys, ribavirin plus Sovaldi on 6/13/14.  She was found to have a lesion on ultrasound with an elevated alpha-fetoprotein and an MRI January 10, 2022 showed a 1.6 cm LIRADS 5 lesion in segment 5. She was seen by Dr. Franklin Olmedo (IR) and had radioembolization of liver tumor on 4/21/22. States that she still has RUQ abdo pain radiating to her right back but that is subsiding and significantly improved since procedure. She has had this type of pain in the past and was felt to be related to gallstone. Denies fever/chills. No N/V. \par \par FibroScan December 20, 2021 F4, S0\par An abdominal sonogram December 13, 2021 with a 1.6 x 1.4 cm lesion in the right lobe of the liver.\par \par Blood test December 14, 2021 INR 1.17, alpha-fetoprotein 35.7, ALT 25, creatinine 0.9\par \par FibroScan June 16, 2020 F3, S3\par \par Fibroscan December 14, 2017 with F3 (11.9 kPa), S1 disease\par \par A fibroscan performed on December 14, 2016 showed F3, S3  disease. (11.8 kPa)\par \par A fibroscan performed on October 13, 2015 showed F4 disease.\par \par A liver biopsy from July 25, 2006 reveals stage III disease with steatosis.\par \par Blood tests April 5, 2021 platelet count 160,000, ALT 25, AST 25, total bilirubin 0.4, alkaline phosphatase 93, creatinine 0.96, INR 1.12, alpha-fetoprotein 11.2, abdominal sonogram April 5, 2021 with no lesions in the liver\par \par Blood test June 23, 2020 platelet count 148,000, alpha-fetoprotein 6.8, ALT 21, AST 21, creatinine 0.92, total bilirubin 0.6, INR 1.16, abdominal sonogram May 14, 2020 with a cirrhotic liver without lesions\par \par Blood tests  November 11, 2019 platelet count 150,000, alpha-fetoprotein 7.5, creatinine 0.93, ALT 25, AST 24, total bilirubin 0.5, INR 1.13. Abdominal sonogram November 7, 2019 without lesions in the liver\par \par Abdominal sonogram May 15, 2019 shows a cirrhotic liver without lesions. Blood tests May 15, 2019 alpha-fetoprotein 7.5, INR 1.1, creatinine 0.83, ALT 28, alkaline phosphatase 82, platelet count [id number]\par \par Blood tests November 3, 2018 platelet count 169,000, ALT 25, AST 24, alkaline phosphatase 101, creatinine 0.86, alpha-fetoprotein 7.3, INR 1.1, abdominal sonogram November 13, 2018 with no lesions in the liver\par \par \par Blood tests May 2018 platelet count 145,000, INR 1.11, total bilirubin 0.5, ALT 22, alkaline phosphatase 100, creatinine 0.93. An abdominal sonogram May 1, 2008 shows no lesions in the liver\par \par Blood test December 12, 2017 ALT 23, AST 26, alkaline phosphatase one of 2, creatinine 0.93\par \par Blood tests October 19, 2017 INR 1.1, platelet count 162,000, ALT 62, AST 54, total bilirubin 0.7, alkaline phosphatase 114, alpha-fetoprotein 7.6.\par \par An abdominal sonogram October 19, 2017 with no lesions in the liver. She does have gallstones\par \par Blood test March 29, 2017 creatinine 0.3, ALT 28, INR 1.11, alpha-fetoprotein 8. Abdominal sonogram October 31, 2016 shows no lesions in the liver\par \par Blood tests September 27, 2016 INR 1.09, ALT 30, alcohol and prostatism 3, creatinine 0.86, alpha-fetoprotein 8.6, HCV RNA not detected, platelet count 145,000\par \par Sonogram March 25, 2016 with gallstones. There were no lesions in the liver.\par \par Blood test for 2016 INR 1.14, platelet count 150,000, creatinine 0.95, ALT 24, alkaline phosphatase 116, alpha-fetoprotein 9.5\par \par Sonogram September 29 2015 gallstones without lesions in the liver\par \par  Abdominal sonogram from February 19, 2015 shows no hepatic lesions, she has multiple small gallstones.\par \par

## 2022-05-10 NOTE — ASSESSMENT
[FreeTextEntry1] : Ms. Rowe comes in today for followup. She has fatty liver and cirrhosis secondary to hepatitis C recently developed hepatocellular carcinoma s/p radioembolization. \par \par #Compensated Cirrhosis\par -Secondary to hepatitis C. \par -Completed treatment for hepatitis C with Pegasys, ribavirin plus Sovaldi on 6/13/14. She is a sustained virological responder. \par \par #Hepatocellular carcinoma\par -MRI from 1/10/22 demonstrates a cirrhotic liver with a new 1.6 cm right hepatic lobe hepatocellular carcinoma (LR-5 Definitely HCC). Alpha-fetoprotein was 35 \par -Chest CT 3/17/22 showed no evidence of intrathoracic metastasis, NM bone imaging 3/17/22 showed no scan evidence of osseous metastasis. \par -s/p radioembolization of liver tumor on 4/21/22 with Dr Olmedo\par \par #NAFLD\par I spoke with the patient at length regarding fatty liver disease. I have explained that fatty liver disease is commonly seen in patients with diabetes or those who are overweight. I have explained that fatty liver disease may also be a precursor to the development of diabetes as it is part of the metabolic syndrome. I have reviewed the treatment of fatty liver disease with the patient. I have explained that the best therapy is diet and exercise. I have reviewed and appropriate diet with the patient. I have recommended the avoidance of fatty foods and to follow a good healthy heart diet. I have explained that weight loss may lead to an improvement in the underlying liver disease state. \par \par #Abdominal pain\par -Upper endoscopy from 10/5/2015 revealed no varices.Colonoscopy from 10/5/2015 which revealed internal hemorrhoids. She will need a repeat colonoscopy in 2025\par -Chronic right upper quadrant pain and was felt to belated gallstones.  She has changed her diet so she does not have pain upon eating.  Cholecystectomy was discussed at previous visit.  She reports other physicians recommendation as well  but would like to hold off.  \par \par Plan:\par BW today. Has scheduled appt with Dr. Olmedo on 5/20 and Dr. Cherry in June. \par

## 2022-05-10 NOTE — PHYSICAL EXAM
[General Appearance - Alert] : alert [General Appearance - In No Acute Distress] : in no acute distress [Sclera] : the sclera and conjunctiva were normal [Oropharynx] : the oropharynx was normal [Neck Appearance] : the appearance of the neck was normal [Neck Cervical Mass (___cm)] : no neck mass was observed [] : no respiratory distress [Auscultation Breath Sounds / Voice Sounds] : lungs were clear to auscultation bilaterally [Heart Rate And Rhythm] : heart rate was normal and rhythm regular [Heart Sounds] : normal S1 and S2 [Edema] : there was no peripheral edema [Bowel Sounds] : normal bowel sounds [Abdomen Soft] : soft [Abdomen Mass (___ Cm)] : no abdominal mass palpated [Nail Clubbing] : no clubbing  or cyanosis of the fingernails [Skin Color & Pigmentation] : normal skin color and pigmentation [No Focal Deficits] : no focal deficits [Oriented To Time, Place, And Person] : oriented to person, place, and time [Abdomen Tenderness] : non-tender [No CVA Tenderness] : no ~M costovertebral angle tenderness [No Spinal Tenderness] : no spinal tenderness [Scleral Icterus] : No Scleral Icterus [Abdominal  Ascites] : no ascites [Asterixis] : no asterixis observed [Jaundice] : No jaundice [Palmar Erythema] : no Palmar Erythema

## 2022-05-11 LAB
25(OH)D3 SERPL-MCNC: 80.5 NG/ML
AFP-TM SERPL-MCNC: 20.4 NG/ML
ALBUMIN SERPL ELPH-MCNC: 4.6 G/DL
ALP BLD-CCNC: 124 U/L
ALT SERPL-CCNC: 47 U/L
ANION GAP SERPL CALC-SCNC: 15 MMOL/L
AST SERPL-CCNC: 31 U/L
BASOPHILS # BLD AUTO: 0.03 K/UL
BASOPHILS NFR BLD AUTO: 1 %
BILIRUB DIRECT SERPL-MCNC: 0.1 MG/DL
BILIRUB SERPL-MCNC: 0.4 MG/DL
BUN SERPL-MCNC: 16 MG/DL
CALCIUM SERPL-MCNC: 9.4 MG/DL
CHLORIDE SERPL-SCNC: 99 MMOL/L
CO2 SERPL-SCNC: 26 MMOL/L
CREAT SERPL-MCNC: 0.85 MG/DL
EGFR: 75 ML/MIN/1.73M2
EOSINOPHIL # BLD AUTO: 0.15 K/UL
EOSINOPHIL NFR BLD AUTO: 5.1 %
HCT VFR BLD CALC: 39 %
HGB BLD-MCNC: 12.5 G/DL
IMM GRANULOCYTES NFR BLD AUTO: 0.3 %
INR PPP: 1.15 RATIO
LYMPHOCYTES # BLD AUTO: 0.37 K/UL
LYMPHOCYTES NFR BLD AUTO: 12.5 %
MAN DIFF?: NORMAL
MCHC RBC-ENTMCNC: 31.6 PG
MCHC RBC-ENTMCNC: 32.1 GM/DL
MCV RBC AUTO: 98.5 FL
MONOCYTES # BLD AUTO: 0.52 K/UL
MONOCYTES NFR BLD AUTO: 17.6 %
NEUTROPHILS # BLD AUTO: 1.88 K/UL
NEUTROPHILS NFR BLD AUTO: 63.5 %
PLATELET # BLD AUTO: 96 K/UL
POTASSIUM SERPL-SCNC: 3.8 MMOL/L
PROT SERPL-MCNC: 7.6 G/DL
PT BLD: 13.4 SEC
RBC # BLD: 3.96 M/UL
RBC # FLD: 13.1 %
SODIUM SERPL-SCNC: 139 MMOL/L
WBC # FLD AUTO: 2.96 K/UL

## 2022-05-19 ENCOUNTER — NON-APPOINTMENT (OUTPATIENT)
Age: 67
End: 2022-05-19

## 2022-05-20 ENCOUNTER — APPOINTMENT (OUTPATIENT)
Dept: INTERVENTIONAL RADIOLOGY/VASCULAR | Facility: CLINIC | Age: 67
End: 2022-05-20
Payer: MEDICARE

## 2022-05-20 PROCEDURE — 99443: CPT | Mod: 95

## 2022-05-26 ENCOUNTER — OUTPATIENT (OUTPATIENT)
Dept: OUTPATIENT SERVICES | Facility: HOSPITAL | Age: 67
LOS: 1 days | End: 2022-05-26
Payer: MEDICARE

## 2022-05-26 ENCOUNTER — APPOINTMENT (OUTPATIENT)
Dept: MRI IMAGING | Facility: CLINIC | Age: 67
End: 2022-05-26
Payer: MEDICARE

## 2022-05-26 DIAGNOSIS — Z00.8 ENCOUNTER FOR OTHER GENERAL EXAMINATION: ICD-10-CM

## 2022-05-26 PROCEDURE — 72141 MRI NECK SPINE W/O DYE: CPT

## 2022-05-26 PROCEDURE — 72141 MRI NECK SPINE W/O DYE: CPT | Mod: 26

## 2022-06-02 ENCOUNTER — APPOINTMENT (OUTPATIENT)
Dept: HEPATOLOGY | Facility: CLINIC | Age: 67
End: 2022-06-02

## 2022-06-28 ENCOUNTER — APPOINTMENT (OUTPATIENT)
Dept: MRI IMAGING | Facility: CLINIC | Age: 67
End: 2022-06-28

## 2022-06-28 ENCOUNTER — OUTPATIENT (OUTPATIENT)
Dept: OUTPATIENT SERVICES | Facility: HOSPITAL | Age: 67
LOS: 1 days | End: 2022-06-28
Payer: MEDICARE

## 2022-06-28 DIAGNOSIS — Z00.8 ENCOUNTER FOR OTHER GENERAL EXAMINATION: ICD-10-CM

## 2022-06-28 DIAGNOSIS — C22.0 LIVER CELL CARCINOMA: ICD-10-CM

## 2022-06-28 PROCEDURE — A9585: CPT

## 2022-06-28 PROCEDURE — 74183 MRI ABD W/O CNTR FLWD CNTR: CPT

## 2022-06-28 PROCEDURE — 74183 MRI ABD W/O CNTR FLWD CNTR: CPT | Mod: 26

## 2022-07-07 ENCOUNTER — APPOINTMENT (OUTPATIENT)
Dept: INTERVENTIONAL RADIOLOGY/VASCULAR | Facility: CLINIC | Age: 67
End: 2022-07-07

## 2022-07-07 PROCEDURE — 99442: CPT | Mod: 95

## 2022-07-13 NOTE — ASSESSMENT
[Other: _____] : [unfilled] [FreeTextEntry1] : This is a 67 y/o female with pmhx of fatty liver and cirrhosis secondary to hepatitis C who presents today for liver directed therapy. \par \par 1. HCC\par - now s/p Y90 4/21/22 to segment 7/8\par - post procedure course with some RUQ pain, fatigue, and nausea for the first 1-2 weeks. Now back to her previous state of health. \par - AFP level normalized; down to 8.0, previously 20.4  (May 2022) , previously 35.7 (Dec 2021), will continue to trend and repeat level prior to imaging\par - f/u MR in 3 months\par - f/u telehealth after imaging\par - continue to follow with hepatology/ Dr. Bowen\par \par I have provided the patient the opportunity to ask questions and have answered them to her satisfaction. She is encouraged to contact our office with any further questions, concerns, or issues. Patient smells of alcohol, loud, aggressive, agitated. Alert and aware. normal...

## 2022-07-13 NOTE — HISTORY OF PRESENT ILLNESS
[FreeTextEntry1] : This is a 65 y/o female with pmhx of fatty liver and cirrhosis secondary to hepatitis C. She completed treatment for hepatitis C with Pegasys, ribavirin plus Sovaldi on 6/13/14. She was found to have a lesion on ultrasound with an elevated alpha-fetoprotein and an MRI January 10, 2022 showed a 1.6 cm LIRADS 5 lesion in segment 5. Reports occasional nausea and intermittent RUQ discomfort. Denies discoloration of the skin/eyes, abdominal distention, LE edema, confusion, hematochezia, melena, or n/v/d. \par \par Pt was referred to IR by Dr. Cherry in 2/2022 to discuss liver directed therapy. She is now s/p Y90 to segment 7/8 on 4/21/22. Post procedure, she had some pain in her right abdomen, below rib cage to waist line, with radiation to back, which lasted about 1 week after the procedure. Reports that the pain has subsided, but still has occasional twinges of discomfort under right breast/rib. Also had nausea intermittently x 1 week and some fatigue for about 3 weeks following intervention. She presents today for follow up after imaging to discuss findings.\par \par Denies fever or chills, vomiting. Back to her normal activity level, has been gardening over the weekend. \par \par \par Hep: Jo Ann

## 2022-07-16 LAB — AFP-TM SERPL-MCNC: 8 NG/ML

## 2022-07-20 ENCOUNTER — APPOINTMENT (OUTPATIENT)
Dept: HEPATOLOGY | Facility: CLINIC | Age: 67
End: 2022-07-20

## 2022-07-20 VITALS
SYSTOLIC BLOOD PRESSURE: 122 MMHG | OXYGEN SATURATION: 98 % | TEMPERATURE: 96.9 F | HEIGHT: 68 IN | BODY MASS INDEX: 26.24 KG/M2 | DIASTOLIC BLOOD PRESSURE: 70 MMHG | WEIGHT: 173.13 LBS | HEART RATE: 75 BPM

## 2022-07-20 PROCEDURE — 99214 OFFICE O/P EST MOD 30 MIN: CPT

## 2022-07-20 NOTE — HISTORY OF PRESENT ILLNESS
[de-identified] : Ms. Rowe comes in today for followup. She has fatty liver and cirrhosis secondary to hepatitis C and was DX with hepatocellular carcinoma in Jan 2022. \par \par 7/20/22: She currently feels well and has been gardening since she is avoiding crowded places. No abdominal pain. Has right shoulder pain which improved after taking Gabapentin. \par \par She completed treatment for hepatitis C with Pegasys, ribavirin plus Sovaldi on 6/13/14.  She was found to have a lesion on ultrasound with an elevated alpha-fetoprotein and an MRI January 10, 2022 showed a 1.6 cm LIRADS 5 lesion in segment 5. She was seen by Dr. Franklin Olmedo (IR) and had radioembolization of liver tumor on 4/21/22. \par \par Upper endoscopy from 10/5/2015 revealed no varices.\par Colonoscopy from 10/5/2015 which revealed internal hemorrhoids. She will need a repeat colonoscopy in 2025\par \par FibroScan December 20, 2021 F4, S0\par FibroScan June 16, 2020 F3, S3\par Fibroscan December 14, 2017 with F3 (11.9 kPa), S1 disease\par A fibroscan performed on December 14, 2016 showed F3, S3  disease. (11.8 kPa)\par A fibroscan performed on October 13, 2015 showed F4 disease.\par A liver biopsy from July 25, 2006 reveals stage III disease with steatosis.\par \par MRI 6/28/22 no evidence of residual tumor. No new lesions. \par \par MRI from 1/10/22 demonstrates a cirrhotic liver with a new 1.6 cm right hepatic lobe hepatocellular carcinoma (LR-5 Definitely HCC). Alpha-fetoprotein was 35\par \par An abdominal sonogram December 13, 2021 with a 1.6 x 1.4 cm lesion in the right lobe of the liver.\par \par Blood test December 14, 2021 INR 1.17, alpha-fetoprotein 35.7, ALT 25, creatinine 0.9\par \par Blood tests April 5, 2021 platelet count 160,000, ALT 25, AST 25, total bilirubin 0.4, alkaline phosphatase 93, creatinine 0.96, INR 1.12, alpha-fetoprotein 11.2, abdominal sonogram April 5, 2021 with no lesions in the liver\par \par Blood test June 23, 2020 platelet count 148,000, alpha-fetoprotein 6.8, ALT 21, AST 21, creatinine 0.92, total bilirubin 0.6, INR 1.16, abdominal sonogram May 14, 2020 with a cirrhotic liver without lesions\par \par Blood tests  November 11, 2019 platelet count 150,000, alpha-fetoprotein 7.5, creatinine 0.93, ALT 25, AST 24, total bilirubin 0.5, INR 1.13. Abdominal sonogram November 7, 2019 without lesions in the liver\par \par Abdominal sonogram May 15, 2019 shows a cirrhotic liver without lesions. Blood tests May 15, 2019 alpha-fetoprotein 7.5, INR 1.1, creatinine 0.83, ALT 28, alkaline phosphatase 82, platelet count [id number]\par \par Blood tests November 3, 2018 platelet count 169,000, ALT 25, AST 24, alkaline phosphatase 101, creatinine 0.86, alpha-fetoprotein 7.3, INR 1.1, abdominal sonogram November 13, 2018 with no lesions in the liver\par \par Blood tests May 2018 platelet count 145,000, INR 1.11, total bilirubin 0.5, ALT 22, alkaline phosphatase 100, creatinine 0.93. An abdominal sonogram May 1, 2008 shows no lesions in the liver\par \par Blood test December 12, 2017 ALT 23, AST 26, alkaline phosphatase one of 2, creatinine 0.93\par \par Blood tests October 19, 2017 INR 1.1, platelet count 162,000, ALT 62, AST 54, total bilirubin 0.7, alkaline phosphatase 114, alpha-fetoprotein 7.6.\par \par An abdominal sonogram October 19, 2017 with no lesions in the liver. She does have gallstones\par \par Blood test March 29, 2017 creatinine 0.3, ALT 28, INR 1.11, alpha-fetoprotein 8. Abdominal sonogram October 31, 2016 shows no lesions in the liver\par \par Blood tests September 27, 2016 INR 1.09, ALT 30, alcohol and prostatism 3, creatinine 0.86, alpha-fetoprotein 8.6, HCV RNA not detected, platelet count 145,000\par \par Sonogram March 25, 2016 with gallstones. There were no lesions in the liver.\par \par Blood test for 2016 INR 1.14, platelet count 150,000, creatinine 0.95, ALT 24, alkaline phosphatase 116, alpha-fetoprotein 9.5\par \par Sonogram September 29 2015 gallstones without lesions in the liver\par \par  Abdominal sonogram from February 19, 2015 shows no hepatic lesions, she has multiple small gallstones.\par \par

## 2022-07-20 NOTE — ASSESSMENT
[FreeTextEntry1] : Ms. Rowe comes in today for followup. She has fatty liver and cirrhosis secondary to hepatitis C recently developed hepatocellular carcinoma s/p radioembolization. \par \par #Compensated Cirrhosis\par -Secondary to hepatitis C. \par -Completed treatment for hepatitis C with Pegasys, ribavirin plus Sovaldi on 6/13/14. She is a sustained virological responder. \par \par #Hepatocellular carcinoma\par -MRI from 1/10/22 demonstrates a cirrhotic liver with a new 1.6 cm right hepatic lobe hepatocellular carcinoma (LR-5 Definitely HCC). Alpha-fetoprotein was 35. S/p radioembolization of liver tumor on 4/21/22 with Dr Olmedo.  MRI 6/28/22 no evidence of residual tumor, no new lesions. AFP 8.0 on 7/5/22. Plan is to repeat MRI in 3 months. \par -Chest CT 3/17/22 showed no evidence of intrathoracic metastasis, NM bone imaging 3/17/22 showed no scan evidence of osseous metastasis. \par \par #NAFLD\par -Been trying to loss weight and lost about 8lbs since Jan 2022. \par  -I spoke with the patient at length regarding fatty liver disease. I recommended gradual weight loss of 5-10% of her body weight. I recommended increased consumption of vegetables and lean proteins, avoidance of red meat and high fructose corn syrup, and avoidance of calorie-containing beverages which she has been doing. \par \par #Abdominal pain\par -Chronic right upper quadrant pain and was felt to belated gallstones, currently no pain.  She has changed her diet so she does not have pain upon eating.  Cholecystectomy was discussed in the past. She reports other physicians recommendation as well  but would like to hold off.  \par \par Plan:\par BW today. F/U in 4 months.

## 2022-07-20 NOTE — PHYSICAL EXAM
[General Appearance - Alert] : alert [General Appearance - In No Acute Distress] : in no acute distress [Sclera] : the sclera and conjunctiva were normal [Oropharynx] : the oropharynx was normal [Neck Appearance] : the appearance of the neck was normal [Neck Cervical Mass (___cm)] : no neck mass was observed [] : no respiratory distress [Heart Rate And Rhythm] : heart rate was normal and rhythm regular [Heart Sounds] : normal S1 and S2 [Edema] : there was no peripheral edema [Bowel Sounds] : normal bowel sounds [Abdomen Soft] : soft [Abdomen Tenderness] : non-tender [Abdomen Mass (___ Cm)] : no abdominal mass palpated [Nail Clubbing] : no clubbing  or cyanosis of the fingernails [Skin Color & Pigmentation] : normal skin color and pigmentation [No Focal Deficits] : no focal deficits [Oriented To Time, Place, And Person] : oriented to person, place, and time [Scleral Icterus] : No Scleral Icterus [Abdominal  Ascites] : no ascites [Asterixis] : no asterixis observed [Jaundice] : No jaundice [Palmar Erythema] : no Palmar Erythema [Respiration, Rhythm And Depth] : normal respiratory rhythm and effort

## 2022-07-21 ENCOUNTER — NON-APPOINTMENT (OUTPATIENT)
Age: 67
End: 2022-07-21

## 2022-07-21 ENCOUNTER — APPOINTMENT (OUTPATIENT)
Dept: HEPATOLOGY | Facility: CLINIC | Age: 67
End: 2022-07-21

## 2022-07-21 LAB
ALBUMIN SERPL ELPH-MCNC: 4.1 G/DL
ALP BLD-CCNC: 124 U/L
ALT SERPL-CCNC: 23 U/L
ANION GAP SERPL CALC-SCNC: 11 MMOL/L
AST SERPL-CCNC: 26 U/L
BASOPHILS # BLD AUTO: 0.04 K/UL
BASOPHILS NFR BLD AUTO: 1 %
BILIRUB SERPL-MCNC: 0.4 MG/DL
BUN SERPL-MCNC: 15 MG/DL
CALCIUM SERPL-MCNC: 9.1 MG/DL
CHLORIDE SERPL-SCNC: 104 MMOL/L
CO2 SERPL-SCNC: 25 MMOL/L
CREAT SERPL-MCNC: 0.93 MG/DL
EGFR: 67 ML/MIN/1.73M2
EOSINOPHIL # BLD AUTO: 0.11 K/UL
EOSINOPHIL NFR BLD AUTO: 2.7 %
HCT VFR BLD CALC: 41.3 %
HGB BLD-MCNC: 12.5 G/DL
IMM GRANULOCYTES NFR BLD AUTO: 0.2 %
INR PPP: 1.21 RATIO
LYMPHOCYTES # BLD AUTO: 0.7 K/UL
LYMPHOCYTES NFR BLD AUTO: 17.2 %
MAN DIFF?: NORMAL
MCHC RBC-ENTMCNC: 30.1 PG
MCHC RBC-ENTMCNC: 30.3 GM/DL
MCV RBC AUTO: 99.5 FL
MONOCYTES # BLD AUTO: 0.52 K/UL
MONOCYTES NFR BLD AUTO: 12.8 %
NEUTROPHILS # BLD AUTO: 2.68 K/UL
NEUTROPHILS NFR BLD AUTO: 66.1 %
PLATELET # BLD AUTO: 164 K/UL
POTASSIUM SERPL-SCNC: 4.3 MMOL/L
PROT SERPL-MCNC: 7.6 G/DL
PT BLD: 14.2 SEC
RBC # BLD: 4.15 M/UL
RBC # FLD: 13 %
SODIUM SERPL-SCNC: 140 MMOL/L
WBC # FLD AUTO: 4.06 K/UL

## 2022-09-28 ENCOUNTER — OUTPATIENT (OUTPATIENT)
Dept: OUTPATIENT SERVICES | Facility: HOSPITAL | Age: 67
LOS: 1 days | End: 2022-09-28
Payer: MEDICARE

## 2022-09-28 ENCOUNTER — RESULT REVIEW (OUTPATIENT)
Age: 67
End: 2022-09-28

## 2022-09-28 ENCOUNTER — APPOINTMENT (OUTPATIENT)
Dept: MRI IMAGING | Facility: CLINIC | Age: 67
End: 2022-09-28

## 2022-09-28 DIAGNOSIS — K74.60 UNSPECIFIED CIRRHOSIS OF LIVER: ICD-10-CM

## 2022-09-28 PROCEDURE — A9585: CPT

## 2022-09-28 PROCEDURE — 74183 MRI ABD W/O CNTR FLWD CNTR: CPT

## 2022-09-28 PROCEDURE — 74183 MRI ABD W/O CNTR FLWD CNTR: CPT | Mod: 26

## 2022-09-30 ENCOUNTER — APPOINTMENT (OUTPATIENT)
Dept: INTERVENTIONAL RADIOLOGY/VASCULAR | Facility: CLINIC | Age: 67
End: 2022-09-30
Payer: MEDICARE

## 2022-09-30 LAB — AFP-TM SERPL-MCNC: 7.4 NG/ML

## 2022-09-30 PROCEDURE — XXXXX: CPT | Mod: 1L

## 2022-09-30 RX ORDER — PANTOPRAZOLE 40 MG/1
40 TABLET, DELAYED RELEASE ORAL DAILY
Qty: 35 | Refills: 0 | Status: DISCONTINUED | COMMUNITY
Start: 2022-02-11 | End: 2022-09-30

## 2022-10-05 ENCOUNTER — NON-APPOINTMENT (OUTPATIENT)
Age: 67
End: 2022-10-05

## 2022-11-15 ENCOUNTER — APPOINTMENT (OUTPATIENT)
Dept: HEPATOLOGY | Facility: CLINIC | Age: 67
End: 2022-11-15

## 2022-11-15 VITALS
TEMPERATURE: 97.3 F | OXYGEN SATURATION: 98 % | WEIGHT: 170 LBS | HEIGHT: 68 IN | HEART RATE: 71 BPM | BODY MASS INDEX: 25.76 KG/M2 | SYSTOLIC BLOOD PRESSURE: 110 MMHG | DIASTOLIC BLOOD PRESSURE: 60 MMHG

## 2022-11-15 LAB
INR PPP: 1.18 RATIO
PT BLD: 13.7 SEC

## 2022-11-15 PROCEDURE — 99215 OFFICE O/P EST HI 40 MIN: CPT

## 2022-11-15 NOTE — ASSESSMENT
[FreeTextEntry1] : Ms. Rowe comes in today for followup. She has fatty liver and cirrhosis secondary to hepatitis C recently developed hepatocellular carcinoma s/p radioembolization. \par \par #Compensated Cirrhosis secondary to hepatitis C. \par -Completed treatment for hepatitis C with Pegasys, ribavirin plus Sovaldi on 6/13/14. She is a sustained virological responder. \par -No signs of decompensation. Will repeat BW today. \par -Esophageal / gastric varices: EGD from 10/5/2015 revealed no varices. Will defer EGD since plts is 164,000 and fibroscan test from 12/20/21 showed 15.5 kPa. \par \par #Hepatocellular carcinoma\par -MRI from 1/10/22 demonstrates a cirrhotic liver with a new 1.6 cm right hepatic lobe hepatocellular carcinoma (LR-5 Definitely HCC). Alpha-fetoprotein was 35. S/p radioembolization of liver tumor on 4/21/22 with Dr Olmedo.  Abdo MRI 9/28/22 showed stable post radioembolization changes without evidence of viable tumor. No new lesions.  AFP now 7.4 on 9/29/22. Scheduled for repeat MRI end of Dec. \par -Chest CT 3/17/22 showed no evidence of intrathoracic metastasis, NM bone imaging 3/17/22 showed no scan evidence of osseous metastasis. \par \par #NAFLD\par -Current weight 170 lbs, BMI 26. \par  -I spoke with the patient at length regarding fatty liver disease. I recommended continue with consumption of vegetables and lean proteins, avoidance of red meat and high fructose corn syrup, and avoidance of calorie-containing beverages. \par \par #Pancreatic cyst\par Has a 1.1 cm cyst in the pancreatic uncinate process unchanged. No main pancreatic duct dilation. Will continue to follow. \par \par Plan:\par BW today. F/U in 3 months.

## 2022-11-15 NOTE — REVIEW OF SYSTEMS
[As Noted in HPI] : as noted in HPI [Negative] : Heme/Lymph [FreeTextEntry4] : neck pain [FreeTextEntry9] : patient has low-back pain.

## 2022-11-15 NOTE — PHYSICAL EXAM
[General Appearance - Alert] : alert [General Appearance - In No Acute Distress] : in no acute distress [General Appearance - Well Nourished] : well nourished [General Appearance - Well Developed] : well developed [Sclera] : the sclera and conjunctiva were normal [Oropharynx] : the oropharynx was normal [Neck Appearance] : the appearance of the neck was normal [Neck Cervical Mass (___cm)] : no neck mass was observed [] : no respiratory distress [Respiration, Rhythm And Depth] : normal respiratory rhythm and effort [Heart Rate And Rhythm] : heart rate was normal and rhythm regular [Heart Sounds] : normal S1 and S2 [Edema] : there was no peripheral edema [Bowel Sounds] : normal bowel sounds [Abdomen Soft] : soft [Abdomen Tenderness] : non-tender [Abdomen Mass (___ Cm)] : no abdominal mass palpated [Nail Clubbing] : no clubbing  or cyanosis of the fingernails [Skin Color & Pigmentation] : normal skin color and pigmentation [No Focal Deficits] : no focal deficits [Oriented To Time, Place, And Person] : oriented to person, place, and time [Affect] : the affect was normal [Scleral Icterus] : No Scleral Icterus [Abdominal  Ascites] : no ascites [Asterixis] : no asterixis observed [Jaundice] : No jaundice [Palmar Erythema] : no Palmar Erythema

## 2022-11-15 NOTE — HISTORY OF PRESENT ILLNESS
[de-identified] : Ms. Rowe comes in today for followup. She has fatty liver and cirrhosis secondary to hepatitis C and was DX with hepatocellular carcinoma in Jan 2022. \par \par 11/15/22 Has neck pain receiving PT/acupuncture for few months with improvement. Had pain in her right abdomen in the past but not in the last few months. States that she feels well. Has not been eating as much as she used to few yrs ago and losing weight slowly, now 170 lbs, BMI 26. Reports had stool testing for colon cancer screening last month with PCP and was neg.  Abdo MRI 9/28/22 showed stable post radioembolization changes without evidence of viable tumor. No new lesions.  AFP 7.4 on 9/29/22. \par \par 7/20/22: She currently feels well and has been gardening since she is avoiding crowded places. No abdominal pain. Has right shoulder pain which improved after taking Gabapentin. \par \par She completed treatment for hepatitis C with Pegasys, ribavirin plus Sovaldi on 6/13/14.  She was found to have a lesion on ultrasound with an elevated alpha-fetoprotein and an MRI January 10, 2022 showed a 1.6 cm LIRADS 5 lesion in segment 5. She was seen by Dr. Franklin Olmedo (IR) and had radioembolization of liver tumor on 4/21/22. \par \par Upper endoscopy from 10/5/2015 revealed no varices.\par Colonoscopy from 10/5/2015 which revealed internal hemorrhoids. She will need a repeat colonoscopy in 2025\par \par FibroScan December 20, 2021 F4, S0\par FibroScan June 16, 2020 F3, S3\par Fibroscan December 14, 2017 with F3 (11.9 kPa), S1 disease\par A fibroscan performed on December 14, 2016 showed F3, S3  disease. (11.8 kPa)\par A fibroscan performed on October 13, 2015 showed F4 disease.\par A liver biopsy from July 25, 2006 reveals stage III disease with steatosis.\par \par MRI 6/28/22 no evidence of residual tumor. No new lesions. \par \par MRI from 1/10/22 demonstrates a cirrhotic liver with a new 1.6 cm right hepatic lobe hepatocellular carcinoma (LR-5 Definitely HCC). Alpha-fetoprotein was 35\par \par An abdominal sonogram December 13, 2021 with a 1.6 x 1.4 cm lesion in the right lobe of the liver.\par \par Blood test December 14, 2021 INR 1.17, alpha-fetoprotein 35.7, ALT 25, creatinine 0.9\par \par Blood tests April 5, 2021 platelet count 160,000, ALT 25, AST 25, total bilirubin 0.4, alkaline phosphatase 93, creatinine 0.96, INR 1.12, alpha-fetoprotein 11.2, abdominal sonogram April 5, 2021 with no lesions in the liver\par \par Blood test June 23, 2020 platelet count 148,000, alpha-fetoprotein 6.8, ALT 21, AST 21, creatinine 0.92, total bilirubin 0.6, INR 1.16, abdominal sonogram May 14, 2020 with a cirrhotic liver without lesions\par \par Blood tests  November 11, 2019 platelet count 150,000, alpha-fetoprotein 7.5, creatinine 0.93, ALT 25, AST 24, total bilirubin 0.5, INR 1.13. Abdominal sonogram November 7, 2019 without lesions in the liver\par \par Abdominal sonogram May 15, 2019 shows a cirrhotic liver without lesions. Blood tests May 15, 2019 alpha-fetoprotein 7.5, INR 1.1, creatinine 0.83, ALT 28, alkaline phosphatase 82, platelet count [id number]\par \par Blood tests November 3, 2018 platelet count 169,000, ALT 25, AST 24, alkaline phosphatase 101, creatinine 0.86, alpha-fetoprotein 7.3, INR 1.1, abdominal sonogram November 13, 2018 with no lesions in the liver\par \par Blood tests May 2018 platelet count 145,000, INR 1.11, total bilirubin 0.5, ALT 22, alkaline phosphatase 100, creatinine 0.93. An abdominal sonogram May 1, 2008 shows no lesions in the liver\par \par Blood test December 12, 2017 ALT 23, AST 26, alkaline phosphatase one of 2, creatinine 0.93\par \par Blood tests October 19, 2017 INR 1.1, platelet count 162,000, ALT 62, AST 54, total bilirubin 0.7, alkaline phosphatase 114, alpha-fetoprotein 7.6.\par \par An abdominal sonogram October 19, 2017 with no lesions in the liver. She does have gallstones\par \par Blood test March 29, 2017 creatinine 0.3, ALT 28, INR 1.11, alpha-fetoprotein 8. Abdominal sonogram October 31, 2016 shows no lesions in the liver\par \par Blood tests September 27, 2016 INR 1.09, ALT 30, alcohol and prostatism 3, creatinine 0.86, alpha-fetoprotein 8.6, HCV RNA not detected, platelet count 145,000\par \par Sonogram March 25, 2016 with gallstones. There were no lesions in the liver.\par \par Blood test for 2016 INR 1.14, platelet count 150,000, creatinine 0.95, ALT 24, alkaline phosphatase 116, alpha-fetoprotein 9.5\par \par Sonogram September 29 2015 gallstones without lesions in the liver\par \par  Abdominal sonogram from February 19, 2015 shows no hepatic lesions, she has multiple small gallstones.\par \par

## 2022-11-16 ENCOUNTER — TRANSCRIPTION ENCOUNTER (OUTPATIENT)
Age: 67
End: 2022-11-16

## 2022-11-16 ENCOUNTER — NON-APPOINTMENT (OUTPATIENT)
Age: 67
End: 2022-11-16

## 2022-11-16 LAB
ALBUMIN SERPL ELPH-MCNC: 4.4 G/DL
ALP BLD-CCNC: 125 U/L
ALT SERPL-CCNC: 24 U/L
ANION GAP SERPL CALC-SCNC: 15 MMOL/L
AST SERPL-CCNC: 24 U/L
BASOPHILS # BLD AUTO: 0.04 K/UL
BASOPHILS NFR BLD AUTO: 1.1 %
BILIRUB SERPL-MCNC: 0.5 MG/DL
BUN SERPL-MCNC: 16 MG/DL
CALCIUM SERPL-MCNC: 9.5 MG/DL
CHLORIDE SERPL-SCNC: 100 MMOL/L
CO2 SERPL-SCNC: 25 MMOL/L
CREAT SERPL-MCNC: 0.82 MG/DL
EGFR: 77 ML/MIN/1.73M2
EOSINOPHIL # BLD AUTO: 0.1 K/UL
EOSINOPHIL NFR BLD AUTO: 2.9 %
ESTIMATED AVERAGE GLUCOSE: 114 MG/DL
HBA1C MFR BLD HPLC: 5.6 %
HCT VFR BLD CALC: 42.4 %
HGB BLD-MCNC: 13.6 G/DL
IMM GRANULOCYTES NFR BLD AUTO: 0.3 %
LYMPHOCYTES # BLD AUTO: 0.91 K/UL
LYMPHOCYTES NFR BLD AUTO: 26.1 %
MAN DIFF?: NORMAL
MCHC RBC-ENTMCNC: 30.9 PG
MCHC RBC-ENTMCNC: 32.1 GM/DL
MCV RBC AUTO: 96.4 FL
MONOCYTES # BLD AUTO: 0.35 K/UL
MONOCYTES NFR BLD AUTO: 10.1 %
NEUTROPHILS # BLD AUTO: 2.07 K/UL
NEUTROPHILS NFR BLD AUTO: 59.5 %
PLATELET # BLD AUTO: 164 K/UL
POTASSIUM SERPL-SCNC: 3.3 MMOL/L
PROT SERPL-MCNC: 7.9 G/DL
RBC # BLD: 4.4 M/UL
RBC # FLD: 14.2 %
SODIUM SERPL-SCNC: 140 MMOL/L
WBC # FLD AUTO: 3.48 K/UL

## 2022-12-07 ENCOUNTER — APPOINTMENT (OUTPATIENT)
Dept: ULTRASOUND IMAGING | Facility: CLINIC | Age: 67
End: 2022-12-07

## 2022-12-07 ENCOUNTER — APPOINTMENT (OUTPATIENT)
Dept: MAMMOGRAPHY | Facility: CLINIC | Age: 67
End: 2022-12-07

## 2022-12-07 ENCOUNTER — OUTPATIENT (OUTPATIENT)
Dept: OUTPATIENT SERVICES | Facility: HOSPITAL | Age: 67
LOS: 1 days | End: 2022-12-07
Payer: MEDICARE

## 2022-12-07 DIAGNOSIS — Z12.31 ENCOUNTER FOR SCREENING MAMMOGRAM FOR MALIGNANT NEOPLASM OF BREAST: ICD-10-CM

## 2022-12-07 DIAGNOSIS — Z00.8 ENCOUNTER FOR OTHER GENERAL EXAMINATION: ICD-10-CM

## 2022-12-07 PROCEDURE — 77063 BREAST TOMOSYNTHESIS BI: CPT | Mod: 26

## 2022-12-07 PROCEDURE — 77063 BREAST TOMOSYNTHESIS BI: CPT

## 2022-12-07 PROCEDURE — 77067 SCR MAMMO BI INCL CAD: CPT

## 2022-12-07 PROCEDURE — 77067 SCR MAMMO BI INCL CAD: CPT | Mod: 26

## 2022-12-13 NOTE — ASU PATIENT PROFILE, ADULT - AS SC BRADEN MOISTURE
(4) rarely moist Rinvoq Pregnancy And Lactation Text: Based on animal studies, Rinvoq may cause embryo-fetal harm when administered to pregnant women.  The medication should not be used in pregnancy.  Breastfeeding is not recommended during treatment and for 6 days after the last dose.

## 2022-12-15 ENCOUNTER — APPOINTMENT (OUTPATIENT)
Dept: DERMATOLOGY | Facility: CLINIC | Age: 67
End: 2022-12-15

## 2022-12-28 ENCOUNTER — OUTPATIENT (OUTPATIENT)
Dept: OUTPATIENT SERVICES | Facility: HOSPITAL | Age: 67
LOS: 1 days | End: 2022-12-28
Payer: MEDICARE

## 2022-12-28 ENCOUNTER — APPOINTMENT (OUTPATIENT)
Dept: MRI IMAGING | Facility: CLINIC | Age: 67
End: 2022-12-28
Payer: MEDICARE

## 2022-12-28 DIAGNOSIS — Z00.8 ENCOUNTER FOR OTHER GENERAL EXAMINATION: ICD-10-CM

## 2022-12-28 LAB — AFP-TM SERPL-MCNC: 5.8 NG/ML

## 2022-12-28 PROCEDURE — 74183 MRI ABD W/O CNTR FLWD CNTR: CPT

## 2022-12-28 PROCEDURE — 74183 MRI ABD W/O CNTR FLWD CNTR: CPT | Mod: 26

## 2022-12-28 PROCEDURE — A9585: CPT

## 2023-01-06 ENCOUNTER — APPOINTMENT (OUTPATIENT)
Dept: INTERVENTIONAL RADIOLOGY/VASCULAR | Facility: CLINIC | Age: 68
End: 2023-01-06
Payer: MEDICARE

## 2023-01-06 PROCEDURE — 99443: CPT

## 2023-01-06 NOTE — REASON FOR VISIT
[Follow-Up: _____] : a [unfilled] follow-up visit [Home] : at home, [unfilled] , at the time of the visit. [Medical Office: ([facility])___] : at the medical office located in  [Verbal consent obtained from patient] : the patient, [unfilled]

## 2023-01-06 NOTE — PHYSICAL EXAM
[Alert] : alert [Normal Voice/Communication] : normal voice communication [Oriented x3] : oriented to person, place, and time [Normal Insight/Judgement] : insight and judgment were intact [Normal Affect] : the affect was normal [Normal Mood] : the mood was normal [Recent Memory Normal] : recent memory was not impaired [Remote Memory Normal] : remote memory was not impaired [Fully active, able to carry on all pre-disease performance without restriction] : Fully active, able to carry on all pre-disease performance without restriction

## 2023-01-09 NOTE — ASSESSMENT
[Other: _____] : [unfilled] [FreeTextEntry1] : This is a 68 y/o female with pmhx of fatty liver, cirrhosis secondary to hepatitis C, and HCC who presents today for liver directed therapy. \par \par 1. HCC\par - now s/p Y90 4/21/22 to segment 7/8\par - MRI from  12/28/22 demonstrates post radioembolization changes in the right hepatic lobe. No evidence of viable tumor (LI-RADS TR nonviable). No new lesions. \par - I have reviewed their imaging and discussed the findings with Ms. FOREMAN.  \par - The imaging does not show any evidence of viable tumor so I am recommending repeat imaging in 3 months. \par - f/u MRI 3 months\par - f/u telehealth after imaging  to review the findings.\par - While there is no indication for intervention at this time, I reinforced the importance of continued surveillance with serial imaging.\par - continue to follow with hepatology/ NP Tiev\par \par 2.  Elevated AFP\par - was 20.4 in 5/2022\par - has been normal since July\par - last level 5.8 on 12/28/22\par  continue to trend\par \par 3.  Abdominal pain\par - hx of cholelithiasis\par - pt with recent intermittent RUQ that radiates to her back\par - encouraged to monitor symptoms closely and f/u with GI or PMD if persist, as she is at risk for cholecystitis with hx of gallstones\par \par \par I have provided the patient the opportunity to ask questions and have answered them to her satisfaction. She is encouraged to contact our office with any further questions, concerns, or issues.\par \par Above case and plan reviewed with Dr. Olmedo

## 2023-01-09 NOTE — DATA REVIEWED
[FreeTextEntry1] : \par \par \par EXAM: [id number] - MR ABDOMEN WAW IC - ORDERED BY: LITA TORRES\par \par \par PROCEDURE DATE: 12/28/2022\par \par \par \par INTERPRETATION: CLINICAL INFORMATION: Hepatocellular cancer status post radioembolization segment 7/8 on 4/21/2022\par \par COMPARISON: MRI 9/28/2022, 4/6/2022.\par \par CONTRAST/COMPLICATIONS:\par IV Contrast: Gadavist 7.5 cc administered 0 cc discarded\par Oral Contrast: NONE\par Complications: None reported at time of study completion\par \par PROCEDURE:\par MRI of the abdomen was performed.\par MRCP was performed.\par \par FINDINGS:\par LOWER CHEST: Within normal limits.\par \par LIVER: Cirrhosis. Stable appearance of posttreatment changes at the hepatic dome and in the anterior segments of the right hepatic lobe. No evidence of viable tumor (LI-RADS TR nonviable). No new lesions.\par BILE DUCTS: Normal caliber.\par GALLBLADDER: Cholelithiasis.\par SPLEEN: Within normal limits.\par PANCREAS: A 1.1 cm cyst in the uncinate process without change. Main pancreatic duct is not dilated.\par ADRENALS: Within normal limits.\par KIDNEYS/URETERS: Small bilateral renal cysts.\par \par VISUALIZED PORTIONS:\par BOWEL: Within normal limits.\par PERITONEUM: No ascites.\par VESSELS: SMV, portal and hepatic veins are patent.\par RETROPERITONEUM/LYMPH NODES: No lymphadenopathy.\par ABDOMINAL WALL: Within normal limits.\par BONES: Within normal limits.\par \par IMPRESSION:\par Stable examination. Post radioembolization changes in the right hepatic lobe. No evidence of viable tumor (LI-RADS TR nonviable). No new lesions.\par \par \par

## 2023-01-09 NOTE — ADDENDUM
[FreeTextEntry1] : I, Dr. Olmedo, personally performed the evaluation and management (E/M) services for this established patient who presents today with (a) new problem(s)/exacerbation of (an) existing condition(s).  That E/M includes conducting the examination, assessing all new/exacerbated conditions, and establishing a new plan of care.  Today, my ACP, [Jennifer Judd NP], was here to observe my evaluation and management services for this new problem/exacerbated condition to be followed going forward.\par \par

## 2023-01-09 NOTE — HISTORY OF PRESENT ILLNESS
[FreeTextEntry1] : This is a 66 y/o female with pmhx of fatty liver, cirrhosis secondary to hepatitis C, and HCC who presents today for follow up for liver directed therapy.\par \par She completed treatment for hepatitis C with Pegasys, ribavirin plus Sovaldi on 6/13/14. She was found to have a lesion on ultrasound with an elevated alpha-fetoprotein and an MRI January 10, 2022 showed a 1.6 cm LIRADS 5 lesion in segment 5. Reports occasional nausea and intermittent RUQ discomfort. Denies discoloration of the skin/eyes, abdominal distention, LE edema, confusion, hematochezia, melena, or n/v/d. \par \par Pt was referred to IR by Dr. Cherry in 2/2022 to discuss liver directed therapy. She is now s/p Y90 to segment 7/8 on 4/21/22. She presents today for follow up after imaging to discuss findings.\par \par Reports to be feeling well, but has been experiencing some right mid abdominal discomfort, which radiates to her back.  She first experienced symptoms for 2 days prior to MRI and then had them again last evening after going to bed.  Denies n/v, or change in bowel habits. Reports a hx of gallstones, was previously advised to have cholecystectomy, but deferred due to acute issues with her liver. \par \par \par Hep: Jo Ann/Jessi\par PMD: Moe

## 2023-03-02 ENCOUNTER — NON-APPOINTMENT (OUTPATIENT)
Age: 68
End: 2023-03-02

## 2023-03-14 ENCOUNTER — APPOINTMENT (OUTPATIENT)
Dept: HEPATOLOGY | Facility: CLINIC | Age: 68
End: 2023-03-14
Payer: MEDICARE

## 2023-03-14 VITALS
DIASTOLIC BLOOD PRESSURE: 80 MMHG | WEIGHT: 174 LBS | OXYGEN SATURATION: 96 % | BODY MASS INDEX: 26.37 KG/M2 | HEART RATE: 76 BPM | TEMPERATURE: 97.3 F | SYSTOLIC BLOOD PRESSURE: 128 MMHG | HEIGHT: 68 IN

## 2023-03-14 PROCEDURE — 99214 OFFICE O/P EST MOD 30 MIN: CPT

## 2023-03-14 NOTE — ASSESSMENT
[FreeTextEntry1] : Ms. Rowe comes in today for followup. She has fatty liver and cirrhosis secondary to hepatitis C and developed hepatocellular carcinoma in Jan 2022 s/p radioembolization. \par \par #Compensated Cirrhosis secondary to hepatitis C. \par -Completed treatment for hepatitis C with Pegasys, ribavirin plus Sovaldi on 6/13/14. She is a sustained virological responder. \par -No signs of decompensation. Will repeat BW today. \par -Esophageal / gastric varices: EGD from 10/5/2015 revealed no varices. Will defer EGD since plts >150,000 and fibroscan test from 12/20/21 showed 15.5 kPa. Will repeat fibroscan. \par \par #Hepatocellular carcinoma\par -MRI from 1/10/22 demonstrates a cirrhotic liver with a new 1.6 cm right hepatic lobe hepatocellular carcinoma (LR-5 Definitely HCC). Alpha-fetoprotein was 35. s/p Y90 4/21/22 to segment 7/8 with Dr Olmedo.  MRI from 12/28/22 demonstrates post radioembolization changes in the right hepatic lobe. No evidence of viable tumor (LI-RADS TR nonviable). No new lesions.   Scheduled for repeat MRI end of March. \par -AFP was 20.4 in 5/2022 and has been normal since July 2022. AFP  5.8 on 12/28/22. \par -Chest CT 3/17/22 showed no evidence of intrathoracic metastasis, NM bone imaging 3/17/22 showed no scan evidence of osseous metastasis. \par \par #NAFLD\par Recommended continue with consumption of vegetables and lean proteins, avoidance of red meat and high fructose corn syrup, and avoidance of calorie-containing beverages. \par \par #Pancreatic cyst\par Has a 1.1 cm cyst in the pancreatic uncinate process unchanged. No main pancreatic duct dilation. Will continue to follow. \par \par # Chronic  intermittent RUQ that radiates to her back\par H/o cholelithiasis, encouraged to f/u with GI if symptoms persist. \par \par \par Plan:\par BW today and fibroscan test- will call to review. F/U in 3 months.

## 2023-03-14 NOTE — REVIEW OF SYSTEMS
[Fever] : no fever [Chills] : no chills [As Noted in HPI] : as noted in HPI [Itching] : no itching [Confused] : no confusion [Negative] : Heme/Lymph [FreeTextEntry9] : patient has low-back pain.

## 2023-03-14 NOTE — PHYSICAL EXAM
[Scleral Icterus] : No Scleral Icterus [Spider Angioma] : No spider angioma(s) were observed [Abdominal  Ascites] : no ascites [Asterixis] : no asterixis observed [Jaundice] : No jaundice [Palmar Erythema] : no Palmar Erythema [General Appearance - Alert] : alert [General Appearance - In No Acute Distress] : in no acute distress [General Appearance - Well Nourished] : well nourished [General Appearance - Well Developed] : well developed [Sclera] : the sclera and conjunctiva were normal [Oropharynx] : the oropharynx was normal [] : no respiratory distress [Respiration, Rhythm And Depth] : normal respiratory rhythm and effort [Auscultation Breath Sounds / Voice Sounds] : lungs were clear to auscultation bilaterally [Heart Rate And Rhythm] : heart rate was normal and rhythm regular [Heart Sounds] : normal S1 and S2 [Edema] : there was no peripheral edema [Abdomen Soft] : soft [Abdomen Tenderness] : non-tender [Abdomen Mass (___ Cm)] : no abdominal mass palpated [Oriented To Time, Place, And Person] : oriented to person, place, and time [Affect] : the affect was normal

## 2023-03-14 NOTE — HISTORY OF PRESENT ILLNESS
[de-identified] : Ms. Rowe comes in today for followup. She has fatty liver and cirrhosis secondary to hepatitis C and developed hepatocellular carcinoma in Jan 2022 s/p radioembolization. \par \par 3/14/23 Feels well but has been experiencing chronic intermittent right mid abdominal discomfort, which radiates to her back. Denies nausea, vomiting, melena, hematochezia, or hematemesis. Has hx of gallstones, was previously advised to have cholecystectomy, but deferred. \par \par 11/15/22 Has neck pain receiving PT/acupuncture for few months with improvement. Had pain in her right abdomen in the past but not in the last few months. States that she feels well. Has not been eating as much as she used to few yrs ago and losing weight slowly, now 170 lbs, BMI 26. Reports had stool testing for colon cancer screening last month with PCP and was neg.  Abdo MRI 9/28/22 showed stable post radioembolization changes without evidence of viable tumor. No new lesions.  AFP 7.4 on 9/29/22. \par \par 7/20/22: She currently feels well and has been gardening since she is avoiding crowded places. No abdominal pain. Has right shoulder pain which improved after taking Gabapentin. \par \par She completed treatment for hepatitis C with Pegasys, ribavirin plus Sovaldi on 6/13/14.  She was found to have a lesion on ultrasound with an elevated alpha-fetoprotein and an MRI January 10, 2022 showed a 1.6 cm LIRADS 5 lesion in segment 5. She was seen by Dr. Franklin Olmedo (IR) and had radioembolization of liver tumor on 4/21/22. \par \par Upper endoscopy from 10/5/2015 revealed no varices.\par Colonoscopy from 10/5/2015 which revealed internal hemorrhoids. She will need a repeat colonoscopy in 2025\par \par FibroScan December 20, 2021 F4, S0\par FibroScan June 16, 2020 F3, S3\par Fibroscan December 14, 2017 with F3 (11.9 kPa), S1 disease\par A fibroscan performed on December 14, 2016 showed F3, S3  disease. (11.8 kPa)\par A fibroscan performed on October 13, 2015 showed F4 disease.\par A liver biopsy from July 25, 2006 reveals stage III disease with steatosis.\par \par MRI 6/28/22 no evidence of residual tumor. No new lesions. \par \par MRI from 1/10/22 demonstrates a cirrhotic liver with a new 1.6 cm right hepatic lobe hepatocellular carcinoma (LR-5 Definitely HCC). Alpha-fetoprotein was 35\par \par An abdominal sonogram December 13, 2021 with a 1.6 x 1.4 cm lesion in the right lobe of the liver.\par \par Blood test December 14, 2021 INR 1.17, alpha-fetoprotein 35.7, ALT 25, creatinine 0.9\par \par Blood tests April 5, 2021 platelet count 160,000, ALT 25, AST 25, total bilirubin 0.4, alkaline phosphatase 93, creatinine 0.96, INR 1.12, alpha-fetoprotein 11.2, abdominal sonogram April 5, 2021 with no lesions in the liver\par \par Blood test June 23, 2020 platelet count 148,000, alpha-fetoprotein 6.8, ALT 21, AST 21, creatinine 0.92, total bilirubin 0.6, INR 1.16, abdominal sonogram May 14, 2020 with a cirrhotic liver without lesions\par \par Blood tests  November 11, 2019 platelet count 150,000, alpha-fetoprotein 7.5, creatinine 0.93, ALT 25, AST 24, total bilirubin 0.5, INR 1.13. Abdominal sonogram November 7, 2019 without lesions in the liver\par \par Abdominal sonogram May 15, 2019 shows a cirrhotic liver without lesions. Blood tests May 15, 2019 alpha-fetoprotein 7.5, INR 1.1, creatinine 0.83, ALT 28, alkaline phosphatase 82, platelet count [id number]\par \par Blood tests November 3, 2018 platelet count 169,000, ALT 25, AST 24, alkaline phosphatase 101, creatinine 0.86, alpha-fetoprotein 7.3, INR 1.1, abdominal sonogram November 13, 2018 with no lesions in the liver\par \par Blood tests May 2018 platelet count 145,000, INR 1.11, total bilirubin 0.5, ALT 22, alkaline phosphatase 100, creatinine 0.93. An abdominal sonogram May 1, 2008 shows no lesions in the liver\par \par Blood test December 12, 2017 ALT 23, AST 26, alkaline phosphatase one of 2, creatinine 0.93\par \par Blood tests October 19, 2017 INR 1.1, platelet count 162,000, ALT 62, AST 54, total bilirubin 0.7, alkaline phosphatase 114, alpha-fetoprotein 7.6.\par \par An abdominal sonogram October 19, 2017 with no lesions in the liver. She does have gallstones\par \par Blood test March 29, 2017 creatinine 0.3, ALT 28, INR 1.11, alpha-fetoprotein 8. Abdominal sonogram October 31, 2016 shows no lesions in the liver\par \par Blood tests September 27, 2016 INR 1.09, ALT 30, alcohol and prostatism 3, creatinine 0.86, alpha-fetoprotein 8.6, HCV RNA not detected, platelet count 145,000\par \par Sonogram March 25, 2016 with gallstones. There were no lesions in the liver.\par \par Blood test for 2016 INR 1.14, platelet count 150,000, creatinine 0.95, ALT 24, alkaline phosphatase 116, alpha-fetoprotein 9.5\par \par Sonogram September 29 2015 gallstones without lesions in the liver\par \par  Abdominal sonogram from February 19, 2015 shows no hepatic lesions, she has multiple small gallstones.\par \par

## 2023-03-15 ENCOUNTER — NON-APPOINTMENT (OUTPATIENT)
Age: 68
End: 2023-03-15

## 2023-03-15 LAB
ALBUMIN SERPL ELPH-MCNC: 4.3 G/DL
ALP BLD-CCNC: 124 U/L
ALT SERPL-CCNC: 24 U/L
ANION GAP SERPL CALC-SCNC: 10 MMOL/L
AST SERPL-CCNC: 21 U/L
BASOPHILS # BLD AUTO: 0.03 K/UL
BASOPHILS NFR BLD AUTO: 1.2 %
BILIRUB DIRECT SERPL-MCNC: 0.1 MG/DL
BILIRUB SERPL-MCNC: 0.3 MG/DL
BUN SERPL-MCNC: 21 MG/DL
CALCIUM SERPL-MCNC: 9.6 MG/DL
CHLORIDE SERPL-SCNC: 102 MMOL/L
CO2 SERPL-SCNC: 29 MMOL/L
CREAT SERPL-MCNC: 0.81 MG/DL
EGFR: 79 ML/MIN/1.73M2
EOSINOPHIL # BLD AUTO: 0.06 K/UL
EOSINOPHIL NFR BLD AUTO: 2.4 %
HCT VFR BLD CALC: 42.2 %
HGB BLD-MCNC: 13.8 G/DL
IMM GRANULOCYTES NFR BLD AUTO: 0 %
INR PPP: 1.14 RATIO
LYMPHOCYTES # BLD AUTO: 1.08 K/UL
LYMPHOCYTES NFR BLD AUTO: 43.7 %
MAN DIFF?: NORMAL
MCHC RBC-ENTMCNC: 31.4 PG
MCHC RBC-ENTMCNC: 32.7 GM/DL
MCV RBC AUTO: 95.9 FL
MONOCYTES # BLD AUTO: 0.25 K/UL
MONOCYTES NFR BLD AUTO: 10.1 %
NEUTROPHILS # BLD AUTO: 1.05 K/UL
NEUTROPHILS NFR BLD AUTO: 42.6 %
PLATELET # BLD AUTO: 121 K/UL
POTASSIUM SERPL-SCNC: 3.9 MMOL/L
PROT SERPL-MCNC: 7.7 G/DL
PT BLD: 13.5 SEC
RBC # BLD: 4.4 M/UL
RBC # FLD: 13.1 %
SODIUM SERPL-SCNC: 142 MMOL/L
WBC # FLD AUTO: 2.47 K/UL

## 2023-03-17 ENCOUNTER — NON-APPOINTMENT (OUTPATIENT)
Age: 68
End: 2023-03-17

## 2023-03-17 LAB
ALPHA-1-FETOPROTEIN-L3: NORMAL %
ALPHA-1-FETOPROTEIN: 6.7 NG/ML

## 2023-03-22 ENCOUNTER — APPOINTMENT (OUTPATIENT)
Dept: HEPATOLOGY | Facility: CLINIC | Age: 68
End: 2023-03-22

## 2023-03-28 ENCOUNTER — APPOINTMENT (OUTPATIENT)
Dept: MRI IMAGING | Facility: CLINIC | Age: 68
End: 2023-03-28
Payer: MEDICARE

## 2023-03-28 ENCOUNTER — OUTPATIENT (OUTPATIENT)
Dept: OUTPATIENT SERVICES | Facility: HOSPITAL | Age: 68
LOS: 1 days | End: 2023-03-28
Payer: MEDICARE

## 2023-03-28 DIAGNOSIS — Z00.8 ENCOUNTER FOR OTHER GENERAL EXAMINATION: ICD-10-CM

## 2023-03-28 PROCEDURE — 74183 MRI ABD W/O CNTR FLWD CNTR: CPT | Mod: 26

## 2023-03-28 PROCEDURE — 74183 MRI ABD W/O CNTR FLWD CNTR: CPT

## 2023-03-28 PROCEDURE — A9585: CPT

## 2023-04-19 ENCOUNTER — APPOINTMENT (OUTPATIENT)
Dept: INTERVENTIONAL RADIOLOGY/VASCULAR | Facility: CLINIC | Age: 68
End: 2023-04-19
Payer: MEDICARE

## 2023-04-19 PROCEDURE — 99214 OFFICE O/P EST MOD 30 MIN: CPT | Mod: 95

## 2023-04-20 ENCOUNTER — APPOINTMENT (OUTPATIENT)
Dept: DERMATOLOGY | Facility: CLINIC | Age: 68
End: 2023-04-20
Payer: MEDICARE

## 2023-04-20 DIAGNOSIS — L21.9 SEBORRHEIC DERMATITIS, UNSPECIFIED: ICD-10-CM

## 2023-04-20 DIAGNOSIS — L85.3 XEROSIS CUTIS: ICD-10-CM

## 2023-04-20 DIAGNOSIS — L82.1 OTHER SEBORRHEIC KERATOSIS: ICD-10-CM

## 2023-04-20 PROCEDURE — 99204 OFFICE O/P NEW MOD 45 MIN: CPT

## 2023-04-20 RX ORDER — KETOCONAZOLE 20.5 MG/ML
2 SHAMPOO, SUSPENSION TOPICAL
Qty: 1 | Refills: 9 | Status: ACTIVE | COMMUNITY
Start: 2023-04-20 | End: 1900-01-01

## 2023-04-20 RX ORDER — HYDROCORTISONE 25 MG/G
2.5 CREAM TOPICAL
Qty: 1 | Refills: 4 | Status: ACTIVE | COMMUNITY
Start: 2023-04-20 | End: 1900-01-01

## 2023-04-20 RX ORDER — FLUOCINOLONE ACETONIDE 0.1 MG/ML
0.01 SOLUTION TOPICAL
Qty: 1 | Refills: 4 | Status: ACTIVE | COMMUNITY
Start: 2023-04-20 | End: 1900-01-01

## 2023-04-20 RX ORDER — KETOCONAZOLE 20 MG/G
2 CREAM TOPICAL
Qty: 1 | Refills: 5 | Status: ACTIVE | COMMUNITY
Start: 2023-04-20 | End: 1900-01-01

## 2023-04-25 ENCOUNTER — APPOINTMENT (OUTPATIENT)
Dept: OPHTHALMOLOGY | Facility: CLINIC | Age: 68
End: 2023-04-25
Payer: MEDICARE

## 2023-04-25 PROCEDURE — 92014 COMPRE OPH EXAM EST PT 1/>: CPT

## 2023-06-07 ENCOUNTER — APPOINTMENT (OUTPATIENT)
Dept: HEPATOLOGY | Facility: CLINIC | Age: 68
End: 2023-06-07
Payer: MEDICARE

## 2023-06-07 DIAGNOSIS — B18.2 CHRONIC VIRAL HEPATITIS C: ICD-10-CM

## 2023-06-07 PROCEDURE — 76981 USE PARENCHYMA: CPT

## 2023-06-14 ENCOUNTER — APPOINTMENT (OUTPATIENT)
Dept: HEPATOLOGY | Facility: CLINIC | Age: 68
End: 2023-06-14
Payer: MEDICARE

## 2023-06-14 ENCOUNTER — LABORATORY RESULT (OUTPATIENT)
Age: 68
End: 2023-06-14

## 2023-06-14 VITALS
SYSTOLIC BLOOD PRESSURE: 110 MMHG | WEIGHT: 173 LBS | DIASTOLIC BLOOD PRESSURE: 80 MMHG | TEMPERATURE: 97.3 F | HEIGHT: 68 IN | BODY MASS INDEX: 26.22 KG/M2 | HEART RATE: 71 BPM | OXYGEN SATURATION: 99 %

## 2023-06-14 PROCEDURE — 99214 OFFICE O/P EST MOD 30 MIN: CPT

## 2023-06-14 RX ORDER — GABAPENTIN 300 MG/1
300 CAPSULE ORAL
Qty: 60 | Refills: 0 | Status: DISCONTINUED | COMMUNITY
Start: 2022-07-14 | End: 2023-06-14

## 2023-06-14 RX ORDER — MULTIVITAMIN
TABLET ORAL
Refills: 0 | Status: DISCONTINUED | COMMUNITY
End: 2023-06-14

## 2023-06-14 NOTE — ASSESSMENT
[FreeTextEntry1] : Ms. Rowe comes in today for followup. She has fatty liver and cirrhosis secondary to hepatitis C and developed hepatocellular carcinoma in Jan 2022 s/p radioembolization. \par \par #Compensated Cirrhosis secondary to hepatitis C. \par -Completed treatment for hepatitis C with Pegasys, ribavirin plus Sovaldi on 6/13/14. She is a sustained virological responder. \par -No signs of decompensation. Will repeat BW today. \par -Esophageal / gastric varices: EGD from 10/5/2015 revealed no varices. Since her recent Fibroscan test on 6/14/23 showed F4 (44.6 kPa), recommended a repeat EGD for EV screening. \par \par #Hepatocellular carcinoma\par -MRI from 1/10/22 demonstrates a cirrhotic liver with a new 1.6 cm right hepatic lobe hepatocellular carcinoma (LR-5 Definitely HCC). Alpha-fetoprotein was 35. s/p Y90 4/21/22 to segment 7/8 with Dr Olmedo.  MRI from 3/28/23 demonstrates post radioembolization changes in the right hepatic lobewithout evidence of viable tumor (LI-RADS TR nonviable). No new lesions. She is scheduled for repeat MRI on 6/28.\par -Elevated AFP was 20.4 in 5/2022 and has been normal since July 2022. \par -Chest CT 3/17/22 showed no evidence of intrathoracic metastasis, NM bone imaging 3/17/22 showed no scan evidence of osseous metastasis. \par \par #NAFLD\par Encouraged maintaining a diet low in carbohydrates, fat and cholesterol, healthy fats (avocados and almonds), lean meats and whole grains. \par \par #Pancreatic cyst\par Has a 1.1 cm cyst in the pancreatic uncinate process unchanged. No main pancreatic duct dilation. Will continue to follow. \par \par # Chronic  intermittent RUQ that radiates to her back\par H/o cholelithiasis, encouraged to f/u with GI if symptoms persist. \par \par \par Plan:\par BW today, schedule EGD, F/U in 3-4 months.

## 2023-06-14 NOTE — HISTORY OF PRESENT ILLNESS
[de-identified] : 66 y/o female with pmhx HTN, fatty liver and cirrhosis secondary to hepatitis C who developed hepatocellular carcinoma in Jan 2022 s/p radioembolization here for follow up. \par \par 6/14/23 Still has intermittent chronic right mid abdominal discomfort, no changes. She otherwise feels well.  No fluid overload. No LLE. Mental status is clear. She is scheduled for repeat abdo MRI on 6/28. No recent BW. Fibroscan test on 6/14/23 showed F4 (44.6 kPa), S1 (). \par \par 3/14/23 Feels well but has been experiencing chronic intermittent right mid abdominal discomfort, which radiates to her back. Denies nausea, vomiting, melena, hematochezia, or hematemesis. Has hx of gallstones, was previously advised to have cholecystectomy, but deferred. \par \par 11/15/22 Has neck pain receiving PT/acupuncture for few months with improvement. Had pain in her right abdomen in the past but not in the last few months. States that she feels well. Has not been eating as much as she used to few yrs ago and losing weight slowly, now 170 lbs, BMI 26. Reports had stool testing for colon cancer screening last month with PCP and was neg.  Abdo MRI 9/28/22 showed stable post radioembolization changes without evidence of viable tumor. No new lesions.  AFP 7.4 on 9/29/22. \par \par 7/20/22: She currently feels well and has been gardening since she is avoiding crowded places. No abdominal pain. Has right shoulder pain which improved after taking Gabapentin. \par \par She completed treatment for hepatitis C with Pegasys, ribavirin plus Sovaldi on 6/13/14.  She was found to have a lesion on ultrasound with an elevated alpha-fetoprotein and an MRI January 10, 2022 showed a 1.6 cm LIRADS 5 lesion in segment 5. She was seen by Dr. Franklin Olmedo (IR) and had radioembolization of liver tumor on 4/21/22. \par \par Upper endoscopy from 10/5/2015 revealed no varices.\par Colonoscopy from 10/5/2015 which revealed internal hemorrhoids. She will need a repeat colonoscopy in 2025\par \par FibroScan December 20, 2021 F4, S0\par FibroScan June 16, 2020 F3, S3\par Fibroscan December 14, 2017 with F3 (11.9 kPa), S1 disease\par A fibroscan performed on December 14, 2016 showed F3, S3  disease. (11.8 kPa)\par A fibroscan performed on October 13, 2015 showed F4 disease.\par A liver biopsy from July 25, 2006 reveals stage III disease with steatosis.\par \par MRI 6/28/22 no evidence of residual tumor. No new lesions. \par \par MRI from 1/10/22 demonstrates a cirrhotic liver with a new 1.6 cm right hepatic lobe hepatocellular carcinoma (LR-5 Definitely HCC). Alpha-fetoprotein was 35\par \par An abdominal sonogram December 13, 2021 with a 1.6 x 1.4 cm lesion in the right lobe of the liver.\par \par Blood test December 14, 2021 INR 1.17, alpha-fetoprotein 35.7, ALT 25, creatinine 0.9\par \par Blood tests April 5, 2021 platelet count 160,000, ALT 25, AST 25, total bilirubin 0.4, alkaline phosphatase 93, creatinine 0.96, INR 1.12, alpha-fetoprotein 11.2, abdominal sonogram April 5, 2021 with no lesions in the liver\par \par Blood test June 23, 2020 platelet count 148,000, alpha-fetoprotein 6.8, ALT 21, AST 21, creatinine 0.92, total bilirubin 0.6, INR 1.16, abdominal sonogram May 14, 2020 with a cirrhotic liver without lesions\par \par Blood tests  November 11, 2019 platelet count 150,000, alpha-fetoprotein 7.5, creatinine 0.93, ALT 25, AST 24, total bilirubin 0.5, INR 1.13. Abdominal sonogram November 7, 2019 without lesions in the liver\par \par Abdominal sonogram May 15, 2019 shows a cirrhotic liver without lesions. Blood tests May 15, 2019 alpha-fetoprotein 7.5, INR 1.1, creatinine 0.83, ALT 28, alkaline phosphatase 82, platelet count [id number]\par \par Blood tests November 3, 2018 platelet count 169,000, ALT 25, AST 24, alkaline phosphatase 101, creatinine 0.86, alpha-fetoprotein 7.3, INR 1.1, abdominal sonogram November 13, 2018 with no lesions in the liver\par \par Blood tests May 2018 platelet count 145,000, INR 1.11, total bilirubin 0.5, ALT 22, alkaline phosphatase 100, creatinine 0.93. An abdominal sonogram May 1, 2008 shows no lesions in the liver\par \par Blood test December 12, 2017 ALT 23, AST 26, alkaline phosphatase one of 2, creatinine 0.93\par \par Blood tests October 19, 2017 INR 1.1, platelet count 162,000, ALT 62, AST 54, total bilirubin 0.7, alkaline phosphatase 114, alpha-fetoprotein 7.6.\par \par An abdominal sonogram October 19, 2017 with no lesions in the liver. She does have gallstones\par \par Blood test March 29, 2017 creatinine 0.3, ALT 28, INR 1.11, alpha-fetoprotein 8. Abdominal sonogram October 31, 2016 shows no lesions in the liver\par \par Blood tests September 27, 2016 INR 1.09, ALT 30, alcohol and prostatism 3, creatinine 0.86, alpha-fetoprotein 8.6, HCV RNA not detected, platelet count 145,000\par \par Sonogram March 25, 2016 with gallstones. There were no lesions in the liver.\par \par Blood test for 2016 INR 1.14, platelet count 150,000, creatinine 0.95, ALT 24, alkaline phosphatase 116, alpha-fetoprotein 9.5\par \par Sonogram September 29 2015 gallstones without lesions in the liver\par \par  Abdominal sonogram from February 19, 2015 shows no hepatic lesions, she has multiple small gallstones.\par \par

## 2023-06-14 NOTE — PHYSICAL EXAM
[General Appearance - Alert] : alert [General Appearance - In No Acute Distress] : in no acute distress [Sclera] : the sclera and conjunctiva were normal [] : no respiratory distress [Respiration, Rhythm And Depth] : normal respiratory rhythm and effort [Edema] : there was no peripheral edema [Abdomen Soft] : soft [Abdomen Tenderness] : non-tender [Abdomen Mass (___ Cm)] : no abdominal mass palpated [Oriented To Time, Place, And Person] : oriented to person, place, and time [Affect] : the affect was normal [Scleral Icterus] : No Scleral Icterus [Spider Angioma] : No spider angioma(s) were observed [Abdominal  Ascites] : no ascites [Asterixis] : no asterixis observed [Jaundice] : No jaundice [Palmar Erythema] : no Palmar Erythema

## 2023-06-14 NOTE — REVIEW OF SYSTEMS
Tonsillectomy   Office [phone]  Keshia Mahajan [phone] (urgent issues)      Ibuprofen 220mg/dose every 6-8hrs, not to exceed 40mg/kg/day    Tylenol 220mg/dose every 4-6hrs, not to exceed 75mg/kg/day    WHAT YOU SHOULD KNOW:   A tonsillectomy is surgery to remove your tonsils  Tonsils are 2 large lumps of tissue in the back of your throat  Adenoids are small lumps of tissue on the top of your throat  Tonsils and adenoids both fight infection  Sometimes only your tonsils are removed  Your adenoids may be taken out at the same time if they are large or infected  AFTER YOU LEAVE:   Medicines:   · NSAIDs/Ibuprofen: These medicines decrease swelling and pain  You can buy NSAIDs without a doctor's order  Ask your primary healthcare provider which medicine is right for you, and how much to take  Take as directed  NSAIDs can cause stomach bleeding or kidney problems if not taken correctly  Do not take aspirin  This can increase your risk of bleeding  · Acetaminophen: This medicine is available without a doctor's order  It may decrease your pain and fever  Ask how much medicine you need and how often to take it  Pain medicines: You may be given a prescription medicine to decrease pain  Do not wait until the pain is severe before you take this medicine  · Take your medicine as directed  Call your healthcare provider if you think your medicine is not helping or if you have side effects  Tell him if you are allergic to any medicine  Keep a list of the medicines, vitamins, and herbs you take  Include the amounts, and when and why you take them  Bring the list or the pill bottles to follow-up visits  Carry your medicine list with you in case of an emergency  Follow up with your healthcare provider as directed:  Write down your questions so you remember to ask them during your visits  What to expect after surgery:   · Pain and swelling: Your throat may be sore up to 2 weeks after surgery   Your face and neck may be swollen or tender  It may be hard to turn your head  · Mild fever: You may have a low fever while your tonsil areas heal  Drink liquids often to help reduce it  · Bleeding:  Bleeding can happen 7 - 10 days after surgery when your scabs fall off, or if you have an infection  Ask how much bleeding to expect  Call with any bleeding  Mouth care: It is normal to have mouth pain and bad breath for a few days after surgery  Care for your mouth as follows:  · Brush your teeth gently  Avoid harsh gargling or tooth brushing  This can cause bleeding  · Gently rinse your mouth as directed to remove blood and mucus  Food and drink:  You will need to follow a liquid diet or soft food diet for several days after surgery  · Drink plenty of liquids: This will help prevent fluid loss, keep your temperature down, decrease pain, and speed healing  Liquids and foods that are cool or cold, such as water, apple or grape juice, and popsicles, will help decrease pain and swelling  Do not drink orange juice or grapefruit juice  They may bother your throat  · Start with soft foods: Once you can drink liquids and your stomach is not upset, you may then have soft, plain foods  Begin with foods like applesauce, oatmeal, soft-boiled eggs, mashed potatoes, gelatin, and ice cream  Once you can eat soft food easily, you may slowly begin to eat solid foods  Avoid anything hot, spicy, or citrus  · Avoid hot food and drinks:  Avoid coffee, tea, soup, and other hot or warm foods and drinks  They can increase your risk for bleeding  Avoid milk and dairy foods if you have problems with thick mucus in your throat  This can cause you to cough, which could hurt your surgery areas  Raw honey, 1 tablespoon, three times a day can help with pain control      Self-care:   · Use ice:  Ice helps decrease swelling and pain  Use an ice pack or put crushed ice in a plastic bag   Cover the ice pack with a towel and place it on your throat for 15 to 20 minutes every hour for 2 days  · Use a cool humidifier: This will help moisten the air and soothe your throat  · Get plenty of rest:  Limit your activity for 7 to 10 days after surgery  It may take 2 weeks for you to recover  Ask when you can drive or return to work  · Do not smoke or go to smoky areas:  Until you heal, smoke may cause you to cough or your throat to start bleeding heavily  · Stay away from people who have colds, sore throats, or the flu: You may get sick more easily after surgery  Contact your surgeon or primary healthcare provider if:   · You have a fever  · You have throat pain or an earache that is worse than expected  · You have pus or blood draining down your throat  · You have itchy skin or a rash  · You have any questions or concerns about your condition or care  Seek care immediately or call 911 if:   · You have bright red bleeding from your nose or mouth, or bleeding that is worse than what you were told to expect  · You feel weak, dizzy, or like you might faint when you sit up or stand  · You have severe throat pain with drooling or voice changes  · Your neck is stiff and painful  · You have swelling or pain in your face or neck  · You have back or chest pain  · You have trouble breathing or swallowing      Office Jarrell Burden [As Noted in HPI] : as noted in HPI [Abdominal Pain] : abdominal pain [Negative] : Heme/Lymph [Fever] : no fever [Chills] : no chills [Melena] : no melena [Itching] : no itching [Confused] : no confusion [FreeTextEntry9] : patient has low-back pain.

## 2023-06-15 ENCOUNTER — NON-APPOINTMENT (OUTPATIENT)
Age: 68
End: 2023-06-15

## 2023-06-15 LAB
AFP-TM SERPL-MCNC: 5.6 NG/ML
ALBUMIN SERPL ELPH-MCNC: 4.3 G/DL
ALP BLD-CCNC: 115 U/L
ALT SERPL-CCNC: 22 U/L
ANION GAP SERPL CALC-SCNC: 14 MMOL/L
AST SERPL-CCNC: 26 U/L
BILIRUB SERPL-MCNC: 0.4 MG/DL
BUN SERPL-MCNC: 16 MG/DL
CALCIUM SERPL-MCNC: 9.5 MG/DL
CHLORIDE SERPL-SCNC: 103 MMOL/L
CO2 SERPL-SCNC: 24 MMOL/L
CREAT SERPL-MCNC: 0.79 MG/DL
EGFR: 81 ML/MIN/1.73M2
INR PPP: 1.18 RATIO
POTASSIUM SERPL-SCNC: 3.9 MMOL/L
PROT SERPL-MCNC: 7.5 G/DL
PT BLD: 14 SEC
SODIUM SERPL-SCNC: 142 MMOL/L

## 2023-06-28 ENCOUNTER — APPOINTMENT (OUTPATIENT)
Dept: MRI IMAGING | Facility: CLINIC | Age: 68
End: 2023-06-28
Payer: MEDICARE

## 2023-06-28 ENCOUNTER — OUTPATIENT (OUTPATIENT)
Dept: OUTPATIENT SERVICES | Facility: HOSPITAL | Age: 68
LOS: 1 days | End: 2023-06-28
Payer: MEDICARE

## 2023-06-28 DIAGNOSIS — C22.0 LIVER CELL CARCINOMA: ICD-10-CM

## 2023-06-28 PROCEDURE — 74183 MRI ABD W/O CNTR FLWD CNTR: CPT

## 2023-06-28 PROCEDURE — 74183 MRI ABD W/O CNTR FLWD CNTR: CPT | Mod: 26

## 2023-06-28 PROCEDURE — A9585: CPT

## 2023-07-07 ENCOUNTER — APPOINTMENT (OUTPATIENT)
Dept: INTERVENTIONAL RADIOLOGY/VASCULAR | Facility: CLINIC | Age: 68
End: 2023-07-07

## 2023-07-07 PROCEDURE — XXXXX: CPT | Mod: 1L

## 2023-07-07 NOTE — DATA REVIEWED
[FreeTextEntry1] : \par \par EXAM: [id number] - MR ABDOMEN WAW IC - ORDERED BY: LA PETE\par \par \par PROCEDURE DATE: 06/28/2023\par \par \par \par INTERPRETATION: CLINICAL INFORMATION: Hepatocellular cancer status post segment 7/8 radioembolization April 2022. Follow-up.\par \par COMPARISON: MRI 3/28/2023, 4/6/2022\par \par CONTRAST/COMPLICATIONS:\par IV Contrast: Gadavist 7.5 cc administered 0 cc discarded\par Oral Contrast: NONE\par Complications: None reported at time of study completion\par \par PROCEDURE:\par MRI of the abdomen was performed.\par MRCP was performed.\par \par FINDINGS:\par LOWER CHEST: Within normal limits.\par \par LIVER: Cirrhosis. Post radioembolization changes in the right hepatic lobe centered in segment 8 without significant change. No evidence of recurrent disease (LR-TR nonviable). No new hepatic lesions.\par BILE DUCTS: Normal caliber.\par GALLBLADDER: Cholelithiasis.\par SPLEEN: Within normal limits.\par PANCREAS: Within normal limits.\par ADRENALS: Within normal limits.\par KIDNEYS/URETERS: Bilateral renal cysts including a 1.5 cm left lower pole renal cyst with a thin septation.\par \par VISUALIZED PORTIONS:\par BOWEL: Within normal limits.\par PERITONEUM: No ascites.\par VESSELS: SMV, portal and hepatic veins are patent.\par RETROPERITONEUM/LYMPH NODES: No lymphadenopathy.\par ABDOMINAL WALL: Within normal limits.\par BONES: Within normal limits.\par \par IMPRESSION:\par \par Stable examination.\par \par Cirrhosis.\par \par Post radioembolization changes in the right hepatic lobe. No evidence of recurrent disease (LR-TR nonviable). No new hepatic lesions.\par \par \par

## 2023-07-07 NOTE — REASON FOR VISIT
[Follow-Up: _____] : a [unfilled] follow-up visit [Home] : at home, [unfilled] , at the time of the visit. [Medical Office: ([facility])___] : at the medical office located in  [Verbal consent obtained from patient] : the patient, [unfilled]

## 2023-07-07 NOTE — HISTORY OF PRESENT ILLNESS
[Home] : at home, [unfilled] , at the time of the visit. [Medical Office: ([facility])___] : at the medical office located in  [Verbal consent obtained from patient] : the patient, [unfilled] [FreeTextEntry1] : Pt was unable to connect via 2 way audio visual technology, transitioned to audio visit. \par \par This is a 67 year old female with hx of fatty liver, cirrhosis secondary to hepatitis C, and HCC who presents today for follow up for liver directed therapy.\par \par She completed treatment for hepatitis C with Pegasys, ribavirin plus Sovaldi on 6/13/14. She was found to have a lesion on ultrasound with an elevated alpha-fetoprotein and an MRI January 10, 2022 showed a 1.6 cm LIRADS 5 lesion in segment 5. \par \par Pt was referred to IR by Dr. Cherry in 2/2022 to discuss liver directed therapy. She is now s/p Y90 to segment 7/8 on 4/21/22. She presents today for follow up after imaging to discuss findings.\par \par Reports to be feeling well, denies discoloration of the skin/eyes, abdominal distention, LE edema, confusion, hematochezia, melena, or n/v/d. \par \par At last visit, she reported how she had been experiencing some right mid abdominal discomfort, which radiated to her back. Denied n/v, or change in bowel habits. Reports a hx of gallstones, was previously advised to have cholecystectomy, but deferred due to acute issues with her liver. She has not had any recent episodes of symptoms. \par \par Hep: Jo Ann / Jessi Torres\par PMD: Moe

## 2023-07-07 NOTE — PHYSICAL EXAM
[Alert] : alert [Normal Voice/Communication] : normal voice communication [Oriented x3] : oriented to person, place, and time [Normal Insight/Judgement] : insight and judgment were intact [Normal Affect] : the affect was normal [Normal Mood] : the mood was normal [Recent Memory Normal] : recent memory was not impaired [Remote Memory Normal] : remote memory was not impaired [Fully active, able to carry on all pre-disease performance without restriction] : Fully active, able to carry on all pre-disease performance without restriction [No Respiratory Distress] : no respiratory distress

## 2023-07-07 NOTE — ASSESSMENT
[Other: _____] : [unfilled] [FreeTextEntry1] : This is a 67 year old female with hx of fatty liver, cirrhosis secondary to hepatitis C, and HCC who presents today for liver directed therapy. \par \par 1.  HCC\par - now s/p Y90 4/21/22 to segment 7/8\par - MRI from 6/28/23 demonstrates cirrhosis and post radioembolization changes in the right hepatic lobe. No evidence of recurrent disease (LR-TR nonviable). No new hepatic lesions. \par - Dr. Olmedo has reviewed her imaging and I discussed the results with Ms. FOREMAN  \par - the imaging does not demonstrate any evidence of viable tumor.  It has now been 1 year since her last treatment, so will repeat imaging in 6 months. \par - f/u telehealth after imaging  to review the results\par - while there is no indication for intervention at this time, I reinforced the importance of continued serial imaging surveillance \par - continue to follow with hepatology / NP Tiev\par \par 2. Elevated AFP\par - was 20.4 in 5/2022\par - has been normal since July 2022\par - last level 5.6 6/15/23\par - continue to trend with hepatology team\par \par 3. Chronic Intermittent RUQ Pain\par - hx of cholelithiasis\par - pt with  intermittent RUQ that radiates to her back - states interval improvement with dietary modification\par - encouraged to monitor symptoms closely and f/u with GI or PMD if persist, as she is at risk for cholecystitis with hx of gallstones\par - haven't had in several months\par \par I have provided the patient the opportunity to ask questions and have answered them to their satisfaction.  They are encouraged to contact our office with any further questions, concerns, or issues.\par \par Above case and plan d/w Dr. Olmedo\par

## 2023-08-05 ENCOUNTER — INPATIENT (INPATIENT)
Facility: HOSPITAL | Age: 68
LOS: 4 days | Discharge: HOME | DRG: 445 | End: 2023-08-10
Attending: GENERAL PRACTICE | Admitting: STUDENT IN AN ORGANIZED HEALTH CARE EDUCATION/TRAINING PROGRAM
Payer: MEDICARE

## 2023-08-05 VITALS
WEIGHT: 169.98 LBS | DIASTOLIC BLOOD PRESSURE: 96 MMHG | HEART RATE: 50 BPM | SYSTOLIC BLOOD PRESSURE: 167 MMHG | HEIGHT: 68 IN | TEMPERATURE: 98 F | RESPIRATION RATE: 15 BRPM

## 2023-08-05 LAB
ALBUMIN SERPL ELPH-MCNC: 4.4 G/DL — SIGNIFICANT CHANGE UP (ref 3.3–5)
ALP SERPL-CCNC: 104 U/L — SIGNIFICANT CHANGE UP (ref 40–120)
ALT FLD-CCNC: 23 U/L — SIGNIFICANT CHANGE UP (ref 10–45)
ANION GAP SERPL CALC-SCNC: 10 MMOL/L — SIGNIFICANT CHANGE UP (ref 5–17)
ANISOCYTOSIS BLD QL: SLIGHT — SIGNIFICANT CHANGE UP
AST SERPL-CCNC: 20 U/L — SIGNIFICANT CHANGE UP (ref 10–40)
BASE EXCESS BLDV CALC-SCNC: 6 MMOL/L — HIGH (ref -2–3)
BASOPHILS # BLD AUTO: 0.05 K/UL — SIGNIFICANT CHANGE UP (ref 0–0.2)
BASOPHILS NFR BLD AUTO: 1.8 % — SIGNIFICANT CHANGE UP (ref 0–2)
BILIRUB SERPL-MCNC: 0.5 MG/DL — SIGNIFICANT CHANGE UP (ref 0.2–1.2)
BUN SERPL-MCNC: 18 MG/DL — SIGNIFICANT CHANGE UP (ref 7–23)
CA-I SERPL-SCNC: 1.25 MMOL/L — SIGNIFICANT CHANGE UP (ref 1.15–1.33)
CALCIUM SERPL-MCNC: 9.3 MG/DL — SIGNIFICANT CHANGE UP (ref 8.4–10.5)
CHLORIDE BLDV-SCNC: 104 MMOL/L — SIGNIFICANT CHANGE UP (ref 96–108)
CHLORIDE SERPL-SCNC: 103 MMOL/L — SIGNIFICANT CHANGE UP (ref 96–108)
CO2 BLDV-SCNC: 36 MMOL/L — HIGH (ref 22–26)
CO2 SERPL-SCNC: 26 MMOL/L — SIGNIFICANT CHANGE UP (ref 22–31)
CREAT SERPL-MCNC: 0.78 MG/DL — SIGNIFICANT CHANGE UP (ref 0.5–1.3)
EGFR: 82 ML/MIN/1.73M2 — SIGNIFICANT CHANGE UP
EOSINOPHIL # BLD AUTO: 0 K/UL — SIGNIFICANT CHANGE UP (ref 0–0.5)
EOSINOPHIL NFR BLD AUTO: 0 % — SIGNIFICANT CHANGE UP (ref 0–6)
GAS PNL BLDV: 138 MMOL/L — SIGNIFICANT CHANGE UP (ref 136–145)
GAS PNL BLDV: SIGNIFICANT CHANGE UP
GAS PNL BLDV: SIGNIFICANT CHANGE UP
GIANT PLATELETS BLD QL SMEAR: PRESENT — SIGNIFICANT CHANGE UP
GLUCOSE BLDV-MCNC: 93 MG/DL — SIGNIFICANT CHANGE UP (ref 70–99)
GLUCOSE SERPL-MCNC: 88 MG/DL — SIGNIFICANT CHANGE UP (ref 70–99)
HCO3 BLDV-SCNC: 34 MMOL/L — HIGH (ref 22–29)
HCT VFR BLD CALC: 41.9 % — SIGNIFICANT CHANGE UP (ref 34.5–45)
HCT VFR BLDA CALC: 42 % — SIGNIFICANT CHANGE UP (ref 34.5–46.5)
HGB BLD CALC-MCNC: 14 G/DL — SIGNIFICANT CHANGE UP (ref 11.7–16.1)
HGB BLD-MCNC: 13.6 G/DL — SIGNIFICANT CHANGE UP (ref 11.5–15.5)
LACTATE BLDV-MCNC: 1.9 MMOL/L — SIGNIFICANT CHANGE UP (ref 0.5–2)
LIDOCAIN IGE QN: 37 U/L — SIGNIFICANT CHANGE UP (ref 7–60)
LYMPHOCYTES # BLD AUTO: 1.45 K/UL — SIGNIFICANT CHANGE UP (ref 1–3.3)
LYMPHOCYTES # BLD AUTO: 54.4 % — HIGH (ref 13–44)
MACROCYTES BLD QL: SLIGHT — SIGNIFICANT CHANGE UP
MANUAL SMEAR VERIFICATION: SIGNIFICANT CHANGE UP
MCHC RBC-ENTMCNC: 31.2 PG — SIGNIFICANT CHANGE UP (ref 27–34)
MCHC RBC-ENTMCNC: 32.5 GM/DL — SIGNIFICANT CHANGE UP (ref 32–36)
MCV RBC AUTO: 96.1 FL — SIGNIFICANT CHANGE UP (ref 80–100)
MONOCYTES # BLD AUTO: 0.21 K/UL — SIGNIFICANT CHANGE UP (ref 0–0.9)
MONOCYTES NFR BLD AUTO: 7.9 % — SIGNIFICANT CHANGE UP (ref 2–14)
NEUTROPHILS # BLD AUTO: 0.89 K/UL — LOW (ref 1.8–7.4)
NEUTROPHILS NFR BLD AUTO: 33.3 % — LOW (ref 43–77)
PCO2 BLDV: 61 MMHG — HIGH (ref 39–42)
PH BLDV: 7.35 — SIGNIFICANT CHANGE UP (ref 7.32–7.43)
PLAT MORPH BLD: ABNORMAL
PLATELET # BLD AUTO: 121 K/UL — LOW (ref 150–400)
PO2 BLDV: 31 MMHG — SIGNIFICANT CHANGE UP (ref 25–45)
POTASSIUM BLDV-SCNC: 4.3 MMOL/L — SIGNIFICANT CHANGE UP (ref 3.5–5.1)
POTASSIUM SERPL-MCNC: 4.3 MMOL/L — SIGNIFICANT CHANGE UP (ref 3.5–5.3)
POTASSIUM SERPL-SCNC: 4.3 MMOL/L — SIGNIFICANT CHANGE UP (ref 3.5–5.3)
PROT SERPL-MCNC: 7.8 G/DL — SIGNIFICANT CHANGE UP (ref 6–8.3)
RBC # BLD: 4.36 M/UL — SIGNIFICANT CHANGE UP (ref 3.8–5.2)
RBC # FLD: 12.9 % — SIGNIFICANT CHANGE UP (ref 10.3–14.5)
RBC BLD AUTO: ABNORMAL
SAO2 % BLDV: 48.4 % — LOW (ref 67–88)
SODIUM SERPL-SCNC: 139 MMOL/L — SIGNIFICANT CHANGE UP (ref 135–145)
TARGETS BLD QL SMEAR: SLIGHT — SIGNIFICANT CHANGE UP
VARIANT LYMPHS # BLD: 2.6 % — SIGNIFICANT CHANGE UP (ref 0–6)
WBC # BLD: 2.67 K/UL — LOW (ref 3.8–10.5)
WBC # FLD AUTO: 2.67 K/UL — LOW (ref 3.8–10.5)

## 2023-08-05 PROCEDURE — G1004: CPT

## 2023-08-05 PROCEDURE — 76705 ECHO EXAM OF ABDOMEN: CPT | Mod: 26

## 2023-08-05 PROCEDURE — 99223 1ST HOSP IP/OBS HIGH 75: CPT

## 2023-08-05 PROCEDURE — 74177 CT ABD & PELVIS W/CONTRAST: CPT | Mod: 26,MG

## 2023-08-05 RX ORDER — AMLODIPINE BESYLATE 2.5 MG/1
5 TABLET ORAL DAILY
Refills: 0 | Status: DISCONTINUED | OUTPATIENT
Start: 2023-08-05 | End: 2023-08-10

## 2023-08-05 RX ORDER — SODIUM CHLORIDE 9 MG/ML
1000 INJECTION INTRAMUSCULAR; INTRAVENOUS; SUBCUTANEOUS
Refills: 0 | Status: DISCONTINUED | OUTPATIENT
Start: 2023-08-05 | End: 2023-08-09

## 2023-08-05 RX ORDER — SODIUM CHLORIDE 9 MG/ML
1000 INJECTION INTRAMUSCULAR; INTRAVENOUS; SUBCUTANEOUS ONCE
Refills: 0 | Status: COMPLETED | OUTPATIENT
Start: 2023-08-05 | End: 2023-08-05

## 2023-08-05 RX ORDER — LOSARTAN POTASSIUM 100 MG/1
100 TABLET, FILM COATED ORAL DAILY
Refills: 0 | Status: DISCONTINUED | OUTPATIENT
Start: 2023-08-05 | End: 2023-08-10

## 2023-08-05 RX ORDER — FAMOTIDINE 10 MG/ML
20 INJECTION INTRAVENOUS ONCE
Refills: 0 | Status: COMPLETED | OUTPATIENT
Start: 2023-08-05 | End: 2023-08-05

## 2023-08-05 RX ORDER — ACETAMINOPHEN 500 MG
975 TABLET ORAL ONCE
Refills: 0 | Status: COMPLETED | OUTPATIENT
Start: 2023-08-05 | End: 2023-08-05

## 2023-08-05 RX ADMIN — LOSARTAN POTASSIUM 100 MILLIGRAM(S): 100 TABLET, FILM COATED ORAL at 19:54

## 2023-08-05 RX ADMIN — FAMOTIDINE 20 MILLIGRAM(S): 10 INJECTION INTRAVENOUS at 13:22

## 2023-08-05 RX ADMIN — Medication 30 MILLILITER(S): at 13:22

## 2023-08-05 RX ADMIN — AMLODIPINE BESYLATE 5 MILLIGRAM(S): 2.5 TABLET ORAL at 19:54

## 2023-08-05 RX ADMIN — SODIUM CHLORIDE 1000 MILLILITER(S): 9 INJECTION INTRAMUSCULAR; INTRAVENOUS; SUBCUTANEOUS at 11:47

## 2023-08-05 RX ADMIN — SODIUM CHLORIDE 100 MILLILITER(S): 9 INJECTION INTRAMUSCULAR; INTRAVENOUS; SUBCUTANEOUS at 18:33

## 2023-08-05 RX ADMIN — SODIUM CHLORIDE 1000 MILLILITER(S): 9 INJECTION INTRAMUSCULAR; INTRAVENOUS; SUBCUTANEOUS at 10:51

## 2023-08-05 NOTE — ED CDU PROVIDER INITIAL DAY NOTE - ATTENDING APP SHARED VISIT CONTRIBUTION OF CARE
I have personally performed a face to face medical and diagnostic evaluation of the patient. I have discussed with and reviewed the Resident's and/or ACP's and/or Medical/PA/NP student's note and agree with the History, ROS, Physical Exam and MDM unless otherwise indicated. A brief summary of my personal evaluation and impression can be found below.     see ed note for HPI, physixal exam    CDU Plan: CTAP noted cholelithiasis without cholecystitis, also noted possible choledocholithiasis with tiny 2 mm stone in the distal CBD, pt to go to CDU for MRCP, pain control, repeat labs, probable GI consult

## 2023-08-05 NOTE — ED PROVIDER NOTE - TOBACCO USE
Progress Note  Psychotherapy Provided [facility]: Individual Psychotherapy 50 minutes provided today  Goals addressed in session:   1-3 D: Met with pt for Individual Therapy session  Elizabeth spoke today about her feelings re: how long she has had her OCD and the intense anxiety that completing her list means to her on a daily basis  A: Elizabeth remained open to expressing herself today, but does not want details of her OCD shared with her Psychiatrist as her OCD is very personal / private to her  She was unable to differentiate the anxiety she experiences for her worries as they area "all the highest they can be "   P: Upcoming sessions will be used to continue to address the above  She will continue to attend both weekly Trauma and ED group therapy sessions  Pain Scale and Suicide Risk [facility]: On a scale of 0 to 10, the patient rates current pain at 4   Behavioral Health Treatment Plan [facility]: Diagnosis and Treatment Plan explained to patient, patient relates understanding diagnosis and is agreeable to Treatment Plan  Assessment    1  Binge-eating disorder, severe (783 6) (F50 81)   2  Bipolar I disorder, most recent episode depressed, in full remission (296 56) (F31 76)   3  Gambling disorder, moderate (312 31) (F63 0)   4   Obsessive-compulsive disorder (300 3) (F42 9)    Signatures   Electronically signed by : [facility]; Jan 12 2017  9:45AM EST                       (Author) Unknown if ever smoked

## 2023-08-05 NOTE — ED CDU PROVIDER INITIAL DAY NOTE - NS ED ATTENDING STATEMENT MOD
This was a shared visit with the SHEKHAR. I reviewed and verified the documentation and independently performed the documented:

## 2023-08-05 NOTE — ED PROVIDER NOTE - ATTENDING CONTRIBUTION TO CARE
Chiki Mcnamara DO: I have personally performed a face to face medical and diagnostic evaluation of the patient. I have discussed with and reviewed the Resident's and/or ACP's and/or Medical/PA/NP student's note and agree with the History, ROS, Physical Exam and MDM unless otherwise indicated. A brief summary of my personal evaluation and impression can be found below.      66 y/o F with PMHx of Hep C, Hepatocellular carcinoma, gallstones presents to ED c/o right upper quadrant pain x 1 week. States having chronic RUQ pain but started to experience intermittent burning RUQ pain radiating to right side back starting last week which is worse with eating and lasts a few seconds. Reports seeing PCP for symptoms last evening who did a physical examination and asked patient to come to ED to rule out gallstones. Pt also reports nausea and mild epigastric burning sensation- reports usually takes omeprazole for GERD. Denies fever, vomiting, diarrhea, constipation, dysuria, hematuria, blood in stool, SOB, palpitations.      pe:    · CONSTITUTIONAL:nad   · CARDIAC: rrr  · RESPIRATORY: ctab  · GASTROINTESTINAL: soft, nondistended, ruq pain w/ murphys sign  · NEUROLOGICAL: Alert and oriented, no focal deficits, no motor or sensory deficits.  · PSYCHIATRIC: a/ox3    w/ ruq pain, concern for biliary pathology such as cholelithiasis, cholecystitis, also concern for ductal obstrucion iso patient's hcc history, will get ruq us, ctap, pain control, labs including bili, alk phos, lfts, likely admission.

## 2023-08-05 NOTE — ED PROVIDER NOTE - OBJECTIVE STATEMENT
66 y/o F with PMHx of Hep C, Hepatocellular carcinoma, gallstones presents to ED c/o right upper quadrant pain x 1 week. States having chronic RUQ pain but started to experience intermittent burning RUQ pain radiating to right side back starting last week which is worse with eating and lasts a few seconds. Reports seeing PCP for symptoms last evening who did a physical examination and asked patient to come to ED to rule out gallstones. Pt also reports nausea and mild epigastric burning sensation- reports usually takes omeprazole for GERD. Denies fever, vomiting, diarrhea, constipation, dysuria, hematuria, blood in stool, SOB, palpitations.

## 2023-08-05 NOTE — ED ADULT NURSE NOTE - OBJECTIVE STATEMENT
Patient is alert  and oriented x3. Color is good and skin warm to touch.  She  is c/o abdominal pain 1week. She denies  nausea or  vomiting.

## 2023-08-05 NOTE — ED PROVIDER NOTE - PROGRESS NOTE DETAILS
[facility] PGY3: patient tolerating PO intake. Patient will be transferred to CDU for MRCP, GI evaluation. GI consult placed via email.

## 2023-08-05 NOTE — ED PROVIDER NOTE - CLINICAL SUMMARY MEDICAL DECISION MAKING FREE TEXT BOX
67-year-old female, history of hep C cirrhosis (no active treatment for hep C at this time), HTN, presenting with 1 week onset of right upper quadrant pain.  Sent in by PMD to rule out gallstones, acute cholecystitis.  Denies vomiting, diarrhea, fever, urinary symptoms at home.  Patient has history of liver tumor in the past which was removed.  Last MRI was on June 28. PMD: Dr. Tam Leon. Vital signs within normal limits on arrival.  Physical exam as noted above.  Patient is well-appearing, not in acute distress, normal respiratory effort, clear lung exam bilaterally, right upper quadrant tenderness on palpation noted, negative peritoneal sign, abdomen is soft, no leg swelling noted.  Differentials include but not limited to biliary colic versus acute cholecystitis versus gastritis.  Will manage symptoms with GI cocktail, Tylenol, will get labs, CT abdomen pelvis, ultrasound of right upper quadrant, reassess.

## 2023-08-05 NOTE — ED ADULT NURSE REASSESSMENT NOTE - NS ED NURSE REASSESS COMMENT FT1
07.00 Am Received the Pt from  TUCKER Mckeon . Pt is Observed for abdominal pain for MRCP  Received the Pt A&OX 4 obeys commands Aundrea N/V/D fever chills cp SOB   Comfort care & safety measures continued  IV site looks clean & dry no signs of infiltration noted pt denies  pain IV site .  Pt is advised to call for help  call bell with in the reach pt verbalized the understanding .  pending CDU  MD mendiola . GCS 15/15 A&OX 4 PERRLA  size 3 Strong upper & lower extremities steady gait   No facial droop  No Hand Leg drop denies numbness tingling  Pt is not in pain now  Pt is NPO   Pt is made aware of NPO status & she verbalized the understanding Continue to monitor

## 2023-08-05 NOTE — ED PROCEDURE NOTE - PROCEDURE ADDITIONAL DETAILS
Emergency Department Focused Ultrasound (RUQ) performed at patient's bedside for educational purposes. The study will have a follow up study performed (MRCP). Pt made aware of cholelithiasis, renal cyst which were seen on previous sono

## 2023-08-05 NOTE — ED CDU PROVIDER INITIAL DAY NOTE - OBJECTIVE STATEMENT
68 y/o F with PMHx of Hep C, Hepatocellular carcinoma, gallstones presents to ED c/o right upper quadrant pain x 1 week. States having chronic RUQ pain but started to experience intermittent burning RUQ pain radiating to right side back starting last week which is worse with eating and lasts a few seconds. Reports seeing PCP for symptoms last evening who did a physical examination and asked patient to come to ED to rule out gallstones. Pt also reports nausea and mild epigastric burning sensation- reports usually takes omeprazole for GERD. Denies fever, vomiting, diarrhea, constipation, dysuria, hematuria, blood in stool, SOB, palpitations.    In ED WBC 2.67 CMP/lipase normal, US noted cirrhosis and area of increased echogenicity and calcification of the right lobe, likely representing post-ablation changes, recommended MRI. CTAP noted cholelithiasis without cholecystitis, also noted possible choledocholithiasis with tiny 2 mm stone in the distal CBD. Given findings and pt's symptoms, sent to CDU for MRCP, pain control, repeat labs. Case d/w ED attending.

## 2023-08-05 NOTE — ED ADULT NURSE REASSESSMENT NOTE - NS ED NURSE REASSESS COMMENT FT1
Pt received from TUCKER Eli. Pt oriented to CDU & plan of care was discussed. Pt A&O x 4. NPO. Pt in CDU for MRI MRCP . Pt denies any abdominal pain at this time. V/S stable, pt afebrile,  IV in place, patent and free of signs of infiltration. Pt resting in bed. Safety & comfort measures maintained. Call bell in reach. Normal saline running 100ml/via pump.

## 2023-08-05 NOTE — ED CDU PROVIDER INITIAL DAY NOTE - DETAILS
RUQ Pain  -MRCP  -Repeat Labs  -Pain Control  -GI eval (email sent by primary ED team).  Case d/w ED attending

## 2023-08-06 DIAGNOSIS — K80.50 CALCULUS OF BILE DUCT WITHOUT CHOLANGITIS OR CHOLECYSTITIS WITHOUT OBSTRUCTION: ICD-10-CM

## 2023-08-06 LAB
ALBUMIN SERPL ELPH-MCNC: 4.1 G/DL — SIGNIFICANT CHANGE UP (ref 3.3–5)
ALP SERPL-CCNC: 98 U/L — SIGNIFICANT CHANGE UP (ref 40–120)
ALT FLD-CCNC: 19 U/L — SIGNIFICANT CHANGE UP (ref 10–45)
ANION GAP SERPL CALC-SCNC: 11 MMOL/L — SIGNIFICANT CHANGE UP (ref 5–17)
AST SERPL-CCNC: 21 U/L — SIGNIFICANT CHANGE UP (ref 10–40)
BILIRUB SERPL-MCNC: 0.5 MG/DL — SIGNIFICANT CHANGE UP (ref 0.2–1.2)
BUN SERPL-MCNC: 13 MG/DL — SIGNIFICANT CHANGE UP (ref 7–23)
CALCIUM SERPL-MCNC: 8.9 MG/DL — SIGNIFICANT CHANGE UP (ref 8.4–10.5)
CHLORIDE SERPL-SCNC: 105 MMOL/L — SIGNIFICANT CHANGE UP (ref 96–108)
CO2 SERPL-SCNC: 25 MMOL/L — SIGNIFICANT CHANGE UP (ref 22–31)
CREAT SERPL-MCNC: 0.74 MG/DL — SIGNIFICANT CHANGE UP (ref 0.5–1.3)
EGFR: 88 ML/MIN/1.73M2 — SIGNIFICANT CHANGE UP
GLUCOSE SERPL-MCNC: 81 MG/DL — SIGNIFICANT CHANGE UP (ref 70–99)
LIDOCAIN IGE QN: 39 U/L — SIGNIFICANT CHANGE UP (ref 7–60)
POTASSIUM SERPL-MCNC: 3.8 MMOL/L — SIGNIFICANT CHANGE UP (ref 3.5–5.3)
POTASSIUM SERPL-SCNC: 3.8 MMOL/L — SIGNIFICANT CHANGE UP (ref 3.5–5.3)
PROT SERPL-MCNC: 7.4 G/DL — SIGNIFICANT CHANGE UP (ref 6–8.3)
SODIUM SERPL-SCNC: 141 MMOL/L — SIGNIFICANT CHANGE UP (ref 135–145)

## 2023-08-06 PROCEDURE — 74183 MRI ABD W/O CNTR FLWD CNTR: CPT | Mod: 26,MG

## 2023-08-06 PROCEDURE — G1004: CPT

## 2023-08-06 PROCEDURE — 99233 SBSQ HOSP IP/OBS HIGH 50: CPT

## 2023-08-06 RX ORDER — SUCRALFATE 1 G
1 TABLET ORAL ONCE
Refills: 0 | Status: COMPLETED | OUTPATIENT
Start: 2023-08-06 | End: 2023-08-06

## 2023-08-06 RX ORDER — PANTOPRAZOLE SODIUM 20 MG/1
1 TABLET, DELAYED RELEASE ORAL
Qty: 0 | Refills: 0 | DISCHARGE

## 2023-08-06 RX ORDER — PANTOPRAZOLE SODIUM 20 MG/1
40 TABLET, DELAYED RELEASE ORAL ONCE
Refills: 0 | Status: COMPLETED | OUTPATIENT
Start: 2023-08-06 | End: 2023-08-06

## 2023-08-06 RX ORDER — FAMOTIDINE 10 MG/ML
20 INJECTION INTRAVENOUS ONCE
Refills: 0 | Status: COMPLETED | OUTPATIENT
Start: 2023-08-06 | End: 2023-08-06

## 2023-08-06 RX ORDER — PANTOPRAZOLE SODIUM 20 MG/1
40 TABLET, DELAYED RELEASE ORAL
Refills: 0 | Status: DISCONTINUED | OUTPATIENT
Start: 2023-08-06 | End: 2023-08-10

## 2023-08-06 RX ORDER — KETOROLAC TROMETHAMINE 30 MG/ML
15 SYRINGE (ML) INJECTION EVERY 8 HOURS
Refills: 0 | Status: DISCONTINUED | OUTPATIENT
Start: 2023-08-06 | End: 2023-08-10

## 2023-08-06 RX ADMIN — SODIUM CHLORIDE 100 MILLILITER(S): 9 INJECTION INTRAMUSCULAR; INTRAVENOUS; SUBCUTANEOUS at 10:36

## 2023-08-06 RX ADMIN — Medication 30 MILLILITER(S): at 11:40

## 2023-08-06 RX ADMIN — SODIUM CHLORIDE 100 MILLILITER(S): 9 INJECTION INTRAMUSCULAR; INTRAVENOUS; SUBCUTANEOUS at 18:14

## 2023-08-06 RX ADMIN — SODIUM CHLORIDE 100 MILLILITER(S): 9 INJECTION INTRAMUSCULAR; INTRAVENOUS; SUBCUTANEOUS at 12:44

## 2023-08-06 RX ADMIN — AMLODIPINE BESYLATE 5 MILLIGRAM(S): 2.5 TABLET ORAL at 20:23

## 2023-08-06 RX ADMIN — SODIUM CHLORIDE 100 MILLILITER(S): 9 INJECTION INTRAMUSCULAR; INTRAVENOUS; SUBCUTANEOUS at 14:09

## 2023-08-06 RX ADMIN — SODIUM CHLORIDE 100 MILLILITER(S): 9 INJECTION INTRAMUSCULAR; INTRAVENOUS; SUBCUTANEOUS at 04:22

## 2023-08-06 RX ADMIN — FAMOTIDINE 20 MILLIGRAM(S): 10 INJECTION INTRAVENOUS at 11:40

## 2023-08-06 RX ADMIN — SODIUM CHLORIDE 100 MILLILITER(S): 9 INJECTION INTRAMUSCULAR; INTRAVENOUS; SUBCUTANEOUS at 11:37

## 2023-08-06 RX ADMIN — SODIUM CHLORIDE 100 MILLILITER(S): 9 INJECTION INTRAMUSCULAR; INTRAVENOUS; SUBCUTANEOUS at 17:23

## 2023-08-06 RX ADMIN — PANTOPRAZOLE SODIUM 40 MILLIGRAM(S): 20 TABLET, DELAYED RELEASE ORAL at 14:06

## 2023-08-06 RX ADMIN — Medication 1 GRAM(S): at 14:05

## 2023-08-06 RX ADMIN — SODIUM CHLORIDE 100 MILLILITER(S): 9 INJECTION INTRAMUSCULAR; INTRAVENOUS; SUBCUTANEOUS at 15:35

## 2023-08-06 NOTE — CONSULT NOTE ADULT - ASSESSMENT
68 y/o F with PMHx of HCV cirrhosis (tx'ed incevac and ribavirin) c/b S7/8 HCC s/p Y90 (04/2022), gallstones presents to ED c/o right upper quadrant pain x 1 week a/w nausea. Has chronic intermittent RUQ pain but started to experience burning RUQ pain radiating to right back starting last week which is worse with eating and lasts a few seconds. Advanced GI consulted for CBD stones.    Impression:  #choledocholithiasis w/o cholecystitis  In ED WBC 2.67 CMP/lipase normal, US noted cirrhosis and area of increased echogenicity and calcification of the right lobe, likely representing post-ablation changes, recommended MRI. CTAP noted cholelithiasis without cholecystitis, also noted possible choledocholithiasis with tiny 2 mm stone in the distal CBD. MRCP (8/6)- Minimal debris and/or tiny distal common bile duct stones.     Recommendations:  - OP ERCP given normal liver enzymes and improved sx  - low fat diet as tolerated for now  - antiemetics and pain control per primary team  - monitor for fevers and maintain low threshold for antibiotics  - monitor CMP, CBC daily  - appreciate surgery input re: eval for CCY- can be done as OP    **THIS NOTE IS NOT FINALIZED UNTIL SIGNED BY THE ATTENDING**    Donna Jacome MD  GI Fellow, PGY-6  Available via [employer] Teams    NON-URGENT CONSULTS:  Please email [email or web address]  AT NIGHT AND ON WEEKENDS:  Contact on-call GI fellow via answering service ([phone]) from 5pm-8am and on weekends/holidays  MONDAY-FRIDAY 8AM-5PM:  Pager# [phone] ([facility]) or [phone] ([facility])   66 y/o F with PMHx of HCV cirrhosis (tx'ed incevac and ribavirin) c/b S7/8 HCC s/p Y90 (04/2022), gallstones presents to ED c/o right upper quadrant pain x 1 week a/w nausea. Has chronic intermittent RUQ pain but started to experience burning RUQ pain radiating to right back starting last week which is worse with eating and lasts a few seconds. Advanced GI consulted for CBD stones.    Impression:  #choledocholithiasis w/o cholecystitis  In ED WBC 2.67 CMP/lipase normal, US noted cirrhosis and area of increased echogenicity and calcification of the right lobe, likely representing post-ablation changes, recommended MRI. CTAP noted cholelithiasis without cholecystitis, also noted possible choledocholithiasis with tiny 2 mm stone in the distal CBD. MRCP (8/6)- Minimal debris and/or tiny distal common bile duct stones.     Recommendations:  - plan for ERCP tentatively 8/7 or 8/8 pending endoscopy availability  - NPO after MN  - low fat diet as tolerated for now  - antiemetics and pain control per primary team  - monitor for fevers and maintain low threshold for antibiotics  - monitor CMP, CBC daily  - appreciate surgery input re: eval for CCY candidacy    **THIS NOTE IS NOT FINALIZED UNTIL SIGNED BY THE ATTENDING**    Donna Jacome MD  GI Fellow, PGY-6  Available via [employer] Teams    NON-URGENT CONSULTS:  Please email [email or web address]  AT NIGHT AND ON WEEKENDS:  Contact on-call GI fellow via answering service ([phone]) from 5pm-8am and on weekends/holidays  MONDAY-FRIDAY 8AM-5PM:  Pager# [phone] ([facility]) or [phone] ([facility])

## 2023-08-06 NOTE — H&P ADULT - PROBLEM SELECTOR PLAN 2
Sx consulted by ED :: to evaluation in AM  further plan pending ERCP  will get Echo in case patient needs Sx

## 2023-08-06 NOTE — ED CDU PROVIDER SUBSEQUENT DAY NOTE - HISTORY
CDU PROGRESS NOTE [facility]: no interval change. pt resting comfortably, reports minimal RUQ pain - declined analgesics. +ruq ttp no guarding. NAD. VSS. NPO at this time, pending MCRP

## 2023-08-06 NOTE — H&P ADULT - ASSESSMENT
Patient is a 66yo Female with past medical history of HTN, HCV cirrhosis (tx'ed incevac and ribavirin) , history of HCC s/p Y90 (04/2022), gallstones presented to ED c/o right upper quadrant pain x 1 week a/w nausea.   Has chronic intermittent RUQ pain but started to experience burning RUQ pain radiating to right back starting last week which is worse with eating and lasts a few seconds.   saw PCP for symptoms prior evening who did a physical examination and asked patient to come to ED to rule out gallstones / acute jose cruz. Denies fever, vomiting, diarrhea, constipation, dysuria, hematuria, blood in stool.   In ED WBC 2.67 CMP/lipase normal, US noted cirrhosis and area of increased echogenicity and calcification of the right lobe, likely representing post-ablation changes, recommended MRI. CTAP noted cholelithiasis without cholecystitis, also noted possible choledocholithiasis with tiny 2 mm stone in the distal CBD. MRCP (8/6)- Minimal debris and/or tiny distal common bile duct stones. Advanced GI consulted for CBD stones, surgery consulted, patient admitted to medicine for further workup

## 2023-08-06 NOTE — H&P ADULT - HISTORY OF PRESENT ILLNESS
INCOMPLETE      >>>>>>Medical Attending Initial / Admission note<<<<<<  -------------------------------------------------------------------------------  CHIEF COMPLAINT & HISTORY OF PRESENT ILLNESS:      Patient is a 66yo Female with past medical history of     --------------------------------------------------------------------------------  PAST MEDICAL / SURGICAL  HISTORY:    PAST MEDICAL & SURGICAL HISTORY:  Hypertension      Hepatitis C  attempted treatment in past and again in 2012 with incevac and ribavirin - succesful treatement      Cirrhosis      Chronic Pancreatitis      Peptic ulcer disease  in past      GERD (gastroesophageal reflux disease)      Cervical arthritis      Torn ACL  right knee      Cholelithiases      H/O: Hysterectomy  h/o Fibroids, 1998          --------------------------------------------------------------------------------  FAMILY HISTORY:    FAMILY HISTORY:  No pertinent family history in first degree relatives      --------------------------------------------------------------------------------  SOCIAL HISTORY:  Alcohol: None reported  Smoking: None reported     --------------------------------------------------------------------------------  ALLERGIES:    [As melissa, reviewed]    --------------------------------------------------------------------------------  MEDICATIONS:   [As melissa, reviewed]    --------------------------------------------------------------------------------  REVIEW OF SYSTEM:    GEN: no fever, no chills, no pain  RESP: no SOB, no cough, no sputum  CVS: no chest pain, no palpitations, no edema  GI: no abdominal pain, no nausea, no vomiting, no constipation, no diarrhea  : no dysuria, no frequency, no hematuria  Neuro: no headache, no dizziness  PSYCH: no anxiety, no depression  Derm : no itching, no rash    --------------------------------------------------------------------------------  VITAL SIGNS:  Height (cm): 172.7  Weight (kg): 77.1  BMI (kg/m2): 25.9  Vital Signs Last 24 HrsT(C): 36.7 (08-06-23 @ 16:33)  T(F): 98.1 (08-06-23 @ 16:33), Max: 98.2 (08-06-23 @ 08:13)  HR: 52 (08-06-23 @ 16:33) (50 - 53)  BP: 151/90 (08-06-23 @ 16:33)  RR: 17 (08-06-23 @ 16:33) (17 - 19)  SpO2: 99% (08-06-23 @ 16:33) (98% - 99%)      CAPILLARY BLOOD GLUCOSE          --------------------------------------------------------------------------------  PHYSICAL EXAM:    GEN: A&O X 3 , NAD , comfortable, pleasant, calm  HEENT: NCAT, PERRL, MMM, hearing intact  Neck: supple , no JVD  CVS: S1S2 , regular , No M/R/G appreciated  PULM: CTA B/L,  no W/R/R appreciated  ABD.: soft. non tender, non distended,  bowel sounds present  Extrem: intact pulses , no edema   Derm: No rash , no ecchymoses  PSYCH : normal mood,  no delusion not anxious    --------------------------------------------------------------------------------  LAB AND IMAGING:   [As melissa, reviewed]    --------------------------------------------------------------------------------  ASSESSMENT AND PLAN:   [As bellow]    --------------------  Case discussed with   ___________________________  MOMO Yates D.O.  Pager: [phone]  08-06-23   >>>>>>Medical Attending Initial / Admission note<<<<<<  -------------------------------------------------------------------------------  CHIEF COMPLAINT & HISTORY OF PRESENT ILLNESS:      Patient is a 66yo Female with past medical history of HTN, HCV cirrhosis (tx'ed incevac and ribavirin) , history of HCC s/p Y90 (04/2022), gallstones presented to ED c/o right upper quadrant pain x 1 week a/w nausea.   Has chronic intermittent RUQ pain but started to experience burning RUQ pain radiating to right back starting last week which is worse with eating and lasts a few seconds.   saw PCP for symptoms prior evening who did a physical examination and asked patient to come to ED to rule out gallstones / acute jose cruz. Denies fever, vomiting, diarrhea, constipation, dysuria, hematuria, blood in stool.   In ED WBC 2.67 CMP/lipase normal, US noted cirrhosis and area of increased echogenicity and calcification of the right lobe, likely representing post-ablation changes, recommended MRI. CTAP noted cholelithiasis without cholecystitis, also noted possible choledocholithiasis with tiny 2 mm stone in the distal CBD. MRCP (8/6)- Minimal debris and/or tiny distal common bile duct stones. Advanced GI consulted for CBD stones, surgery consulted, patient admitted to medicine for further workup     --------------------------------------------------------------------------------  PAST MEDICAL / SURGICAL  HISTORY:    Hypertension  Hepatitis C: treated   Cirrhosis  HCC post Yitrium 90   Chronic Pancreatitis  Peptic ulcer disease in past  GERD (gastroesophageal reflux disease)  Cervical arthritis  Torn ACL right knee  Cholelithiases  H/O: Hysterectomy h/o Fibroids, 1998    --------------------------------------------------------------------------------  FAMILY HISTORY:    No pertinent family history in first degree relatives    --------------------------------------------------------------------------------  SOCIAL HISTORY:  Alcohol: None reported  Smoking: None reported     --------------------------------------------------------------------------------  ALLERGIES:    [As melissa, reviewed]    --------------------------------------------------------------------------------  MEDICATIONS:   [As bellow, reviewed]    --------------------------------------------------------------------------------  REVIEW OF SYSTEM:    GEN: no fever, no chills, no pain  RESP: no SOB, no cough, no sputum  CVS: no chest pain, no palpitations, no edema  GI: no abdominal pain now, no more nausea, no vomiting, ate some food ( sandwich) in ER, no complains   : no dysuria, no frequency, no hematuria  Neuro: no headache, no dizziness  PSYCH: no anxiety, no depression  Derm : no itching, no rash    --------------------------------------------------------------------------------  VITAL SIGNS:  Height (cm): 172.7  Weight (kg): 77.1  BMI (kg/m2): 25.9  Vital Signs Last 24 HrsT(C): 36.7 (08-06-23 @ 16:33)  T(F): 98.1 (08-06-23 @ 16:33), Max: 98.2 (08-06-23 @ 08:13)  HR: 52 (08-06-23 @ 16:33) (50 - 53)  BP: 151/90 (08-06-23 @ 16:33)  RR: 17 (08-06-23 @ 16:33) (17 - 19)  SpO2: 99% (08-06-23 @ 16:33) (98% - 99%)        --------------------------------------------------------------------------------  PHYSICAL EXAM:    GEN: A&O X 3 , NAD , comfortable, pleasant, calm  HEENT: NCAT, PERRL, MMM, hearing intact  Neck: supple , no JVD  CVS: S1S2 , regular , No M/R/G appreciated  PULM: CTA B/L,  no W/R/R appreciated  ABD.: soft. mild RUQ tenderness on palpation , non distended,  bowel sounds present  Extrem: intact pulses , no edema   Derm: No rash , no ecchymoses  PSYCH : normal mood,  no delusion not anxious    --------------------------------------------------------------------------------  LAB AND IMAGING:   [As bellow, reviewed]    --------------------------------------------------------------------------------  ASSESSMENT AND PLAN:   [As bellow]    --------------------  Case discussed with patient   ___________________________  HTobin Yates D.O.  Pager: [phone]  08-06-23

## 2023-08-06 NOTE — ED CDU PROVIDER SUBSEQUENT DAY NOTE - NS ED MD PROGRESS NOTE PROVIDER NAME FT
[Follow-Up: _____] : a [unfilled] follow-up visit [FreeTextEntry1] : JAMAAL MARS is a 33 year old lady who was found to have a sella mass after giving birth to her daughter when she was diagnosed with preeclampsia.  The patient was unable to have a formal follow up regarding the sellar mass and is planning to become pregnant again.  She was referred for an endocrinology work up and was seen by Dr. Channing Santacruz.  The office was called to have her lab work faxed to the office.  her Prolactin level was 25.1.  She has normal periods.  The patient was also referred for a formal eye exam.  She is to see Dr. Mcgovern on 11/20/2020.\par  - Sarai Kerr PA-C

## 2023-08-06 NOTE — ED CDU PROVIDER DISPOSITION NOTE - NSFOLLOWUPINSTRUCTIONS_ED_ALL_ED_FT
Please follow up with your primary care doctor within 1 week.  Follow up with general surgery team in 1 week    *Bring all printed lab/test results to your appointment(s).*    Take acetaminophen 500-1000mg every 6 hrs as needed for pain. DO NOT EXCEED 4000mg DAILY.  Zofran 4mg every 6hrs as needed for nausea/vomiting.  (Please read all medication information/instructions).     Advance diet as tolerated.    Return to the ED for any new concerns.

## 2023-08-06 NOTE — ED CDU PROVIDER SUBSEQUENT DAY NOTE - PROGRESS NOTE DETAILS
Was seen and evaluated this morning remained n.p.o. overnight pending MRCP.  Radiology aware. MRI has resulted, went over results with patient, awaiting formal GI consultation, patient reporting she still has pain. have trialed different options at this point. will PO challenge as there seems to be no indication for emergent ERCP. D/w Dr. Pressley. - Sarai Kerr PA-C gastroenterology at bedside offered patient admission versus outpatient follow-up patient opts to have work-up inpatient.  Patient did tolerate p.o. but endorses significant pain at this time.  Plan for admission discussed with Dr. Pressley.  Will page surgery for inpatient consultation

## 2023-08-06 NOTE — CONSULT NOTE ADULT - SUBJECTIVE AND OBJECTIVE BOX
Chief Complaint:  Patient is a 67y old  Female who presents with a chief complaint of     HPI: 66 y/o F with PMHx of HCV cirrhosis (tx'ed incevac and ribavirin) c/b S7/8 HCC s/p Y90 (04/2022), gallstones presents to ED c/o right upper quadrant pain x 1 week a/w nausea. Has chronic intermittent RUQ pain but started to experience burning RUQ pain radiating to right back starting last week which is worse with eating and lasts a few seconds. Reports seeing PCP for symptoms last evening who did a physical examination and asked patient to come to ED to rule out gallstones. Denies fever, vomiting, diarrhea, constipation, dysuria, hematuria, blood in stool. In ED WBC 2.67 CMP/lipase normal, US noted cirrhosis and area of increased echogenicity and calcification of the right lobe, likely representing post-ablation changes, recommended MRI. CTAP noted cholelithiasis without cholecystitis, also noted possible choledocholithiasis with tiny 2 mm stone in the distal CBD. MRCP (8/6)- Minimal debris and/or tiny distal common bile duct stones. [facility] consulted for CBD stones.    Allergies:  aspirin (Other)  No Known Allergies      Home Medications:    Hospital Medications:  amLODIPine   Tablet 5 milliGRAM(s) Oral daily  hydrochlorothiazide 12.5 milliGRAM(s) Oral daily  losartan 100 milliGRAM(s) Oral daily  pantoprazole    Tablet 40 milliGRAM(s) Oral before breakfast  sodium chloride 0.9%. 1000 milliLiter(s) IV Continuous <Continuous>  sucralfate 1 Gram(s) Oral once      PMHX/PSHX:  Hypertension    Hepatitis C    Cirrhosis    Chronic Pancreatitis    Peptic ulcer disease    GERD (gastroesophageal reflux disease)    Murmur    Cervical arthritis    Torn ACL    Cholelithiases    H/O: Hysterectomy        Family history:  No pertinent family history in first degree relatives     Denies any family history of GI-related disease or cancers.    Social History:   ETOH: denies  Tobacco: denies  Illicit drug use: denies    ROS: 14 point ROS negative unless otherwise stated in HPI      Vital Signs:  Vital Signs Last 24 Hrs  T(C): 36.8 (06 Aug 2023 12:09), Max: 36.8 (05 Aug 2023 19:50)  T(F): 98.2 (06 Aug 2023 12:09), Max: 98.2 (05 Aug 2023 19:50)  HR: 52 (06 Aug 2023 12:09) (50 - 57)  BP: 152/91 (06 Aug 2023 12:09) (114/75 - 153/97)  BP(mean): 111 (06 Aug 2023 12:09) (88 - 111)  RR: 19 (06 Aug 2023 12:09) (17 - 19)  SpO2: 99% (06 Aug 2023 12:09) (95% - 99%)    Parameters below as of 06 Aug 2023 12:09  Patient On (Oxygen Delivery Method): room air      Daily     Daily     PHYSICAL EXAM:     GENERAL:  Appears stated age, well-groomed, well-nourished, no distress  HEENT:  NC/AT,  conjunctivae clear and pink  CHEST:  Full & symmetric excursion, no increased effort, breath sounds clear  HEART:  Regular rhythm, S1, S2, no murmur/rub/S3/S4  ABDOMEN:  Soft, non-tender, non-distended, normoactive bowel sounds,    EXTREMITIES:  no cyanosis,clubbing or edema  SKIN:  No rash/erythema/ecchymoses/petechiae/wounds/abscess/warm/dry  NEURO:  Alert, orientedx3      LABS:                        13.6   2.67  )-----------( 121      ( 05 Aug 2023 10:38 )             41.9     08-06    141  |  105  |  13  ----------------------------<  81  3.8   |  25  |  0.74    Ca    8.9      06 Aug 2023 04:32    TPro  7.4  /  Alb  4.1  /  TBili  0.5  /  DBili  x   /  AST  21  /  ALT  19  /  AlkPhos  98  08-06    LIVER FUNCTIONS - ( 06 Aug 2023 04:32 )  Alb: 4.1 g/dL / Pro: 7.4 g/dL / ALK PHOS: 98 U/L / ALT: 19 U/L / AST: 21 U/L / GGT: x             Urinalysis Basic - ( 06 Aug 2023 04:32 )    Color: x / Appearance: x / SG: x / pH: x  Gluc: 81 mg/dL / Ketone: x  / Bili: x / Urobili: x   Blood: x / Protein: x / Nitrite: x   Leuk Esterase: x / RBC: x / WBC x   Sq Epi: x / Non Sq Epi: x / Bacteria: x      Amylase Serum--      Lipase serum39       Ammonia--      Imaging:       ACC: [id number] EXAM:  MR MRCP [facility] IC   ORDERED BY: ALBER SANDHU     PROCEDURE DATE:  08/06/2023          INTERPRETATION:  CLINICAL INFORMATION: Right upper quadrant pain, rule   out choledocholithiasis. Patient has a history of hepatocellular   carcinoma status post segment 7/8 radio-embolization in April 2022.    COMPARISON: CT of the abdomen and pelvis dated 08/05/2023, MRI of the   abdomen dated 06/28/2023, and MRI of the abdomen dated 04/06/2022    CONTRAST/COMPLICATIONS:  IV Contrast: Gadavist  9 cc administered   1 cc discarded  Oral Contrast: NONE  Complications: None reported at time of study completion    PROCEDURE:  MRI of the abdomen was performed.  MRCP was performed.    FINDINGS:  LOWER CHEST: Right lung base subsegmental atelectasis    LIVER: Cirrhotic. Stable post radio-embolization changes in the right   hepatic lobe centered segments 8, 5. No evidence of recurrent disease   (LR-TR nonviable). No new lesions.  BILE DUCTS: Minimal debris and/or tiny distal common bile duct stones   (11, 28-29). Top normal caliber, upper common bile duct 6 mm. Incidental   slightly low medial insertion of the cystic duct upon the common bile   duct.  GALLBLADDER: Cholelithiasis, few small caliber stones.  SPLEEN: Within normal limits.  PANCREAS: Within normal limits. No ductal dilatation. Stable 11 mm cystic   lesion posterior to or exophytic off the uncinate process (4, 29).  ADRENALS: Within normal limits.  KIDNEYS/URETERS: Bilateral renal cysts including a 14 mm septated left   lower pole cyst (23, 58).    VISUALIZED PORTIONS:  BOWEL: No obstruction.  PERITONEUM: No ascites.  VESSELS: Patent hepatic vasculature.  RETROPERITONEUM/LYMPH NODES: No lymphadenopathy.  ABDOMINAL WALL: Fat-containing umbilical hernia.  BONES: No aggressive osseous lesion.    IMPRESSION:    Choledocholithiasis, minimal debris and/or tiny distal common bile duct   stones. No significant ductal dilatation.    Cholelithiasis.    Cirrhosis.    Post radioembolization changes as detailed above withoutevidence of   recurrent disease.    LI-RADS v 2018 Category: LR-TR Nonviable           Chief Complaint:  Patient is a 67y old  Female who presents with a chief complaint of     HPI: 66 y/o F with PMHx of HCV cirrhosis (tx'ed incevac and ribavirin) c/b S7/8 HCC s/p Y90 (04/2022), gallstones presents to ED c/o right upper quadrant pain x 1 week a/w nausea. Has chronic intermittent RUQ pain but started to experience burning RUQ pain radiating to right back starting last week which is worse with eating and lasts a few seconds. Reports seeing PCP for symptoms last evening who did a physical examination and asked patient to come to ED to rule out gallstones. Denies fever, vomiting, diarrhea, constipation, dysuria, hematuria, blood in stool. In ED WBC 2.67 CMP/lipase normal, US noted cirrhosis and area of increased echogenicity and calcification of the right lobe, likely representing post-ablation changes, recommended MRI. CTAP noted cholelithiasis without cholecystitis, also noted possible choledocholithiasis with tiny 2 mm stone in the distal CBD. MRCP (8/6)- Minimal debris and/or tiny distal common bile duct stones. [facility] consulted for CBD stones.    Allergies:  aspirin (Other)  No Known Allergies      Home Medications:    Hospital Medications:  amLODIPine   Tablet 5 milliGRAM(s) Oral daily  hydrochlorothiazide 12.5 milliGRAM(s) Oral daily  losartan 100 milliGRAM(s) Oral daily  pantoprazole    Tablet 40 milliGRAM(s) Oral before breakfast  sodium chloride 0.9%. 1000 milliLiter(s) IV Continuous <Continuous>  sucralfate 1 Gram(s) Oral once      PMHX/PSHX:  Hypertension    Hepatitis C    Cirrhosis    Chronic Pancreatitis    Peptic ulcer disease    GERD (gastroesophageal reflux disease)    Murmur    Cervical arthritis    Torn ACL    Cholelithiases    H/O: Hysterectomy        Family history:  No pertinent family history in first degree relatives     Denies any family history of GI-related disease or cancers.    Social History:   ETOH: denies  Tobacco: denies  Illicit drug use: denies    ROS: 14 point ROS negative unless otherwise stated in HPI      Vital Signs:  Vital Signs Last 24 Hrs  T(C): 36.8 (06 Aug 2023 12:09), Max: 36.8 (05 Aug 2023 19:50)  T(F): 98.2 (06 Aug 2023 12:09), Max: 98.2 (05 Aug 2023 19:50)  HR: 52 (06 Aug 2023 12:09) (50 - 57)  BP: 152/91 (06 Aug 2023 12:09) (114/75 - 153/97)  BP(mean): 111 (06 Aug 2023 12:09) (88 - 111)  RR: 19 (06 Aug 2023 12:09) (17 - 19)  SpO2: 99% (06 Aug 2023 12:09) (95% - 99%)    Parameters below as of 06 Aug 2023 12:09  Patient On (Oxygen Delivery Method): room air      Daily     Daily     PHYSICAL EXAM:     GENERAL:  Appears stated age, well-groomed, well-nourished, no distress  HEENT:  NC/AT,  conjunctivae clear and pink  CHEST:  Full & symmetric excursion, no increased effort, breath sounds clear  HEART:  Regular rhythm, S1, S2, no murmur/rub/S3/S4  ABDOMEN:  Soft, +RUQ-tenderness, non-distended, normoactive bowel sounds,    EXTREMITIES:  no cyanosis,clubbing or edema  SKIN:  No rash/erythema/ecchymoses/petechiae/wounds/abscess/warm/dry  NEURO:  Alert, orientedx3      LABS:                        13.6   2.67  )-----------( 121      ( 05 Aug 2023 10:38 )             41.9     08-06    141  |  105  |  13  ----------------------------<  81  3.8   |  25  |  0.74    Ca    8.9      06 Aug 2023 04:32    TPro  7.4  /  Alb  4.1  /  TBili  0.5  /  DBili  x   /  AST  21  /  ALT  19  /  AlkPhos  98  08-06    LIVER FUNCTIONS - ( 06 Aug 2023 04:32 )  Alb: 4.1 g/dL / Pro: 7.4 g/dL / ALK PHOS: 98 U/L / ALT: 19 U/L / AST: 21 U/L / GGT: x             Urinalysis Basic - ( 06 Aug 2023 04:32 )    Color: x / Appearance: x / SG: x / pH: x  Gluc: 81 mg/dL / Ketone: x  / Bili: x / Urobili: x   Blood: x / Protein: x / Nitrite: x   Leuk Esterase: x / RBC: x / WBC x   Sq Epi: x / Non Sq Epi: x / Bacteria: x      Amylase Serum--      Lipase serum39       Ammonia--      Imaging:       ACC: [id number] EXAM:  MR MRCP [facility] IC   ORDERED BY: ALBER SANDHU     PROCEDURE DATE:  08/06/2023          INTERPRETATION:  CLINICAL INFORMATION: Right upper quadrant pain, rule   out choledocholithiasis. Patient has a history of hepatocellular   carcinoma status post segment 7/8 radio-embolization in April 2022.    COMPARISON: CT of the abdomen and pelvis dated 08/05/2023, MRI of the   abdomen dated 06/28/2023, and MRI of the abdomen dated 04/06/2022    CONTRAST/COMPLICATIONS:  IV Contrast: Gadavist  9 cc administered   1 cc discarded  Oral Contrast: NONE  Complications: None reported at time of study completion    PROCEDURE:  MRI of the abdomen was performed.  MRCP was performed.    FINDINGS:  LOWER CHEST: Right lung base subsegmental atelectasis    LIVER: Cirrhotic. Stable post radio-embolization changes in the right   hepatic lobe centered segments 8, 5. No evidence of recurrent disease   (LR-TR nonviable). No new lesions.  BILE DUCTS: Minimal debris and/or tiny distal common bile duct stones   (11, 28-29). Top normal caliber, upper common bile duct 6 mm. Incidental   slightly low medial insertion of the cystic duct upon the common bile   duct.  GALLBLADDER: Cholelithiasis, few small caliber stones.  SPLEEN: Within normal limits.  PANCREAS: Within normal limits. No ductal dilatation. Stable 11 mm cystic   lesion posterior to or exophytic off the uncinate process (4, 29).  ADRENALS: Within normal limits.  KIDNEYS/URETERS: Bilateral renal cysts including a 14 mm septated left   lower pole cyst (23, 58).    VISUALIZED PORTIONS:  BOWEL: No obstruction.  PERITONEUM: No ascites.  VESSELS: Patent hepatic vasculature.  RETROPERITONEUM/LYMPH NODES: No lymphadenopathy.  ABDOMINAL WALL: Fat-containing umbilical hernia.  BONES: No aggressive osseous lesion.    IMPRESSION:    Choledocholithiasis, minimal debris and/or tiny distal common bile duct   stones. No significant ductal dilatation.    Cholelithiasis.    Cirrhosis.    Post radioembolization changes as detailed above withoutevidence of   recurrent disease.    LI-RADS v 2018 Category: LR-TR Nonviable           Chief Complaint:  Patient is a 67y old  Female who presents with a chief complaint of  RUQ abd pain    HPI: 68 y/o F with PMHx of HCV cirrhosis (tx'ed incevac and ribavirin) c/b S7/8 HCC s/p Y90 (04/2022), gallstones presents to ED c/o right upper quadrant pain x 1 week a/w nausea. Has chronic intermittent RUQ pain but started to experience burning RUQ pain radiating to right back starting last week which is worse with eating and lasts a few seconds. Reports seeing PCP for symptoms last evening who did a physical examination and asked patient to come to ED to rule out gallstones. Denies fever, vomiting, diarrhea, constipation, dysuria, hematuria, blood in stool. In ED WBC 2.67 CMP/lipase normal, US noted cirrhosis and area of increased echogenicity and calcification of the right lobe, likely representing post-ablation changes, recommended MRI. CTAP noted cholelithiasis without cholecystitis, also noted possible choledocholithiasis with tiny 2 mm stone in the distal CBD. MRCP (8/6)- Minimal debris and/or tiny distal common bile duct stones. [facility] consulted for CBD stones.    Allergies:  aspirin (Other)  No Known Allergies      Home Medications:    Hospital Medications:  amLODIPine   Tablet 5 milliGRAM(s) Oral daily  hydrochlorothiazide 12.5 milliGRAM(s) Oral daily  losartan 100 milliGRAM(s) Oral daily  pantoprazole    Tablet 40 milliGRAM(s) Oral before breakfast  sodium chloride 0.9%. 1000 milliLiter(s) IV Continuous <Continuous>  sucralfate 1 Gram(s) Oral once      PMHX/PSHX:  Hypertension    Hepatitis C    Cirrhosis    Chronic Pancreatitis    Peptic ulcer disease    GERD (gastroesophageal reflux disease)    Murmur    Cervical arthritis    Torn ACL    Cholelithiases    H/O: Hysterectomy        Family history:  No pertinent family history in first degree relatives     Denies any family history of GI-related disease or cancers.    Social History:   ETOH: denies  Tobacco: denies  Illicit drug use: denies    ROS: 14 point ROS negative unless otherwise stated in HPI      Vital Signs:  Vital Signs Last 24 Hrs  T(C): 36.8 (06 Aug 2023 12:09), Max: 36.8 (05 Aug 2023 19:50)  T(F): 98.2 (06 Aug 2023 12:09), Max: 98.2 (05 Aug 2023 19:50)  HR: 52 (06 Aug 2023 12:09) (50 - 57)  BP: 152/91 (06 Aug 2023 12:09) (114/75 - 153/97)  BP(mean): 111 (06 Aug 2023 12:09) (88 - 111)  RR: 19 (06 Aug 2023 12:09) (17 - 19)  SpO2: 99% (06 Aug 2023 12:09) (95% - 99%)    Parameters below as of 06 Aug 2023 12:09  Patient On (Oxygen Delivery Method): room air      Daily     Daily     PHYSICAL EXAM:     GENERAL:  Appears stated age, well-groomed, well-nourished, no distress  HEENT:  NC/AT,  conjunctivae clear and pink  CHEST:  Full & symmetric excursion, no increased effort, breath sounds clear  HEART:  Regular rhythm, S1, S2, no murmur/rub/S3/S4  ABDOMEN:  Soft, +RUQ-tenderness, non-distended, normoactive bowel sounds,    EXTREMITIES:  no cyanosis,clubbing or edema  SKIN:  No rash/erythema/ecchymoses/petechiae/wounds/abscess/warm/dry  NEURO:  Alert, orientedx3      LABS:                        13.6   2.67  )-----------( 121      ( 05 Aug 2023 10:38 )             41.9     08-06    141  |  105  |  13  ----------------------------<  81  3.8   |  25  |  0.74    Ca    8.9      06 Aug 2023 04:32    TPro  7.4  /  Alb  4.1  /  TBili  0.5  /  DBili  x   /  AST  21  /  ALT  19  /  AlkPhos  98  08-06    LIVER FUNCTIONS - ( 06 Aug 2023 04:32 )  Alb: 4.1 g/dL / Pro: 7.4 g/dL / ALK PHOS: 98 U/L / ALT: 19 U/L / AST: 21 U/L / GGT: x             Urinalysis Basic - ( 06 Aug 2023 04:32 )    Color: x / Appearance: x / SG: x / pH: x  Gluc: 81 mg/dL / Ketone: x  / Bili: x / Urobili: x   Blood: x / Protein: x / Nitrite: x   Leuk Esterase: x / RBC: x / WBC x   Sq Epi: x / Non Sq Epi: x / Bacteria: x      Amylase Serum--      Lipase serum39       Ammonia--      Imaging:       ACC: [id number] EXAM:  MR MRCP [facility] IC   ORDERED BY: ALBER SANDHU     PROCEDURE DATE:  08/06/2023          INTERPRETATION:  CLINICAL INFORMATION: Right upper quadrant pain, rule   out choledocholithiasis. Patient has a history of hepatocellular   carcinoma status post segment 7/8 radio-embolization in April 2022.    COMPARISON: CT of the abdomen and pelvis dated 08/05/2023, MRI of the   abdomen dated 06/28/2023, and MRI of the abdomen dated 04/06/2022    CONTRAST/COMPLICATIONS:  IV Contrast: Gadavist  9 cc administered   1 cc discarded  Oral Contrast: NONE  Complications: None reported at time of study completion    PROCEDURE:  MRI of the abdomen was performed.  MRCP was performed.    FINDINGS:  LOWER CHEST: Right lung base subsegmental atelectasis    LIVER: Cirrhotic. Stable post radio-embolization changes in the right   hepatic lobe centered segments 8, 5. No evidence of recurrent disease   (LR-TR nonviable). No new lesions.  BILE DUCTS: Minimal debris and/or tiny distal common bile duct stones   (11, 28-29). Top normal caliber, upper common bile duct 6 mm. Incidental   slightly low medial insertion of the cystic duct upon the common bile   duct.  GALLBLADDER: Cholelithiasis, few small caliber stones.  SPLEEN: Within normal limits.  PANCREAS: Within normal limits. No ductal dilatation. Stable 11 mm cystic   lesion posterior to or exophytic off the uncinate process (4, 29).  ADRENALS: Within normal limits.  KIDNEYS/URETERS: Bilateral renal cysts including a 14 mm septated left   lower pole cyst (23, 58).    VISUALIZED PORTIONS:  BOWEL: No obstruction.  PERITONEUM: No ascites.  VESSELS: Patent hepatic vasculature.  RETROPERITONEUM/LYMPH NODES: No lymphadenopathy.  ABDOMINAL WALL: Fat-containing umbilical hernia.  BONES: No aggressive osseous lesion.    IMPRESSION:    Choledocholithiasis, minimal debris and/or tiny distal common bile duct   stones. No significant ductal dilatation.    Cholelithiasis.    Cirrhosis.    Post radioembolization changes as detailed above withoutevidence of   recurrent disease.    LI-RADS v 2018 Category: LR-TR Nonviable

## 2023-08-06 NOTE — ED CDU PROVIDER DISPOSITION NOTE - ATTENDING APP SHARED VISIT CONTRIBUTION OF CARE
I personally have seen and examined this patient.  Physician assistant note reviewed and agree on plan of care and except where noted. Patient re-evaluated and doing well.  No acute issues at  this time.  Lab and radiology tests reviewed with patient and/or family.  pt with mild ruq tend, deferred pain meds. Patient states he has prior CBD stones which passed spontaneously.  Patient has no nausea or vomiting this time no fevers patient has never had a gallbladder removed.

## 2023-08-06 NOTE — ED CDU PROVIDER DISPOSITION NOTE - CLINICAL COURSE
68 y/o F with PMHx of Hep C, Hepatocellular carcinoma, gallstones presents to ED c/o right upper quadrant pain x 1 week. States having chronic RUQ pain but started to experience intermittent burning RUQ pain radiating to right side back starting last week which is worse with eating and lasts a few seconds. Reports seeing PCP for symptoms last evening who did a physical examination and asked patient to come to ED to rule out gallstones. Pt also reports nausea and mild epigastric burning sensation- reports usually takes omeprazole for GERD. Denies fever, vomiting, diarrhea, constipation, dysuria, hematuria, blood in stool, SOB, palpitations.    	In ED WBC 2.67 CMP/lipase normal, US noted cirrhosis and area of increased echogenicity and calcification of the right lobe, likely representing post-ablation changes, recommended MRI. CTAP noted cholelithiasis without cholecystitis, also noted possible choledocholithiasis with tiny 2 mm stone in the distal CBD. Given findings and pt's symptoms, sent to CDU for MRCP, pain control, repeat labs. Case d/w ED attending  in cdu, 66 y/o F with PMHx of Hep C, Hepatocellular carcinoma, gallstones presents to ED c/o right upper quadrant pain x 1 week. States having chronic RUQ pain but started to experience intermittent burning RUQ pain radiating to right side back starting last week which is worse with eating and lasts a few seconds. Reports seeing PCP for symptoms last evening who did a physical examination and asked patient to come to ED to rule out gallstones. Pt also reports nausea and mild epigastric burning sensation- reports usually takes omeprazole for GERD. Denies fever, vomiting, diarrhea, constipation, dysuria, hematuria, blood in stool, SOB, palpitations.    	In ED WBC 2.67 CMP/lipase normal, US noted cirrhosis and area of increased echogenicity and calcification of the right lobe, likely representing post-ablation changes, recommended MRI. CTAP noted cholelithiasis without cholecystitis, also noted possible choledocholithiasis with tiny 2 mm stone in the distal CBD. Given findings and pt's symptoms, sent to CDU for MRCP, pain control, repeat labs. Case d/w ED attending. MRCP with debris in CBD. no evidence of acute cholecystitis. no leukocytosis or transaminitis. patient remained in significant pain during stay although tolerating PO. Plan for admission for ERCP on tuesday and surgical consultation/ d/w rodolfo.

## 2023-08-07 DIAGNOSIS — I10 ESSENTIAL (PRIMARY) HYPERTENSION: ICD-10-CM

## 2023-08-07 DIAGNOSIS — R10.9 UNSPECIFIED ABDOMINAL PAIN: ICD-10-CM

## 2023-08-07 DIAGNOSIS — K74.60 UNSPECIFIED CIRRHOSIS OF LIVER: ICD-10-CM

## 2023-08-07 DIAGNOSIS — K80.50 CALCULUS OF BILE DUCT WITHOUT CHOLANGITIS OR CHOLECYSTITIS WITHOUT OBSTRUCTION: ICD-10-CM

## 2023-08-07 DIAGNOSIS — K80.20 CALCULUS OF GALLBLADDER WITHOUT CHOLECYSTITIS WITHOUT OBSTRUCTION: ICD-10-CM

## 2023-08-07 LAB
HCV AB S/CO SERPL IA: 13.64 S/CO — HIGH (ref 0–0.99)
HCV AB SERPL-IMP: REACTIVE

## 2023-08-07 PROCEDURE — 99223 1ST HOSP IP/OBS HIGH 75: CPT | Mod: GC,25

## 2023-08-07 PROCEDURE — 76376 3D RENDER W/INTRP POSTPROCES: CPT | Mod: 26

## 2023-08-07 PROCEDURE — 43274 ERCP DUCT STENT PLACEMENT: CPT | Mod: GC

## 2023-08-07 PROCEDURE — 43264 ERCP REMOVE DUCT CALCULI: CPT | Mod: GC

## 2023-08-07 DEVICE — GWIRE TRIPLE LUMEN SPHINCTEROTOME STR 3.9FR 7X20MM: Type: IMPLANTABLE DEVICE | Status: FUNCTIONAL

## 2023-08-07 DEVICE — STENT ADVANIX SINGLE PIGTAIL 5X4CM: Type: IMPLANTABLE DEVICE | Status: FUNCTIONAL

## 2023-08-07 DEVICE — AUTOTOME CANNULATING SPHINCTEROTOME RX 44 20MM: Type: IMPLANTABLE DEVICE | Status: FUNCTIONAL

## 2023-08-07 DEVICE — CATH BLLN EXTRACT DIST GUIDE WIRE 15MM 3LUM: Type: IMPLANTABLE DEVICE | Status: FUNCTIONAL

## 2023-08-07 DEVICE — BLLN EXTRACT FUSION QUATRO 8.5 10 12 15MM: Type: IMPLANTABLE DEVICE | Status: FUNCTIONAL

## 2023-08-07 DEVICE — GWIRE VISIGLIDE ST TIP 270CM: Type: IMPLANTABLE DEVICE | Status: FUNCTIONAL

## 2023-08-07 RX ORDER — INDOMETHACIN 50 MG
100 CAPSULE ORAL ONCE
Refills: 0 | Status: DISCONTINUED | OUTPATIENT
Start: 2023-08-07 | End: 2023-08-10

## 2023-08-07 RX ADMIN — SODIUM CHLORIDE 100 MILLILITER(S): 9 INJECTION INTRAMUSCULAR; INTRAVENOUS; SUBCUTANEOUS at 04:27

## 2023-08-07 RX ADMIN — SODIUM CHLORIDE 100 MILLILITER(S): 9 INJECTION INTRAMUSCULAR; INTRAVENOUS; SUBCUTANEOUS at 10:01

## 2023-08-07 NOTE — PATIENT PROFILE ADULT - FALL HARM RISK - HARM RISK INTERVENTIONS

## 2023-08-07 NOTE — PROGRESS NOTE ADULT - ASSESSMENT
_________________________________________________________________________________________  ========>>  M E D I C A L   A T T E N D I N G    F O L L O W  U P  N O T E  <<=========  -----------------------------------------------------------------------------------------------------    - Patient seen and examined by me earlier today.   - In summary,  JAY FOREMAN is a 67y year old woman admitted with abd pain, choledocholithiasis, cholelithiasis   - Patient today overall doing ok, comfortable, NPO .. pain minimal     ==================>> REVIEW OF SYSTEM <<=================    GEN: no fever, no chills, minimal RUQ pain at times   RESP: no SOB, no cough, no sputum  CVS: no chest pain, no palpitations, no edema  GI:  abdominal pain as above , no nausea, no BM  : no dysuria, no frequency, no hematuria  Neuro: no headache, no dizziness  Derm : no itching, no rash    ==================>> PHYSICAL EXAM <<=================    GEN: A&O X 3 , NAD , comfortable, pleasant, calm   HEENT: NCAT, PERRL, MMM, hearing intact  Neck: supple , no JVD appreciated  CVS: S1S2 , regular , No M/R/G appreciated  PULM: CTA B/L,  no W/R/R appreciated  ABD.: soft. no significant tenderness, non distended,  bowel sounds present  Extrem: intact pulses , no edema   PSYCH : normal mood,  not anxious                            ( Note Written / Date of service :  08-07-23 )    ==================>> MEDICATIONS <<====================    MEDICATIONS  (STANDING):  amLODIPine   Tablet 5 milliGRAM(s) Oral daily  hydrochlorothiazide 12.5 milliGRAM(s) Oral daily  losartan 100 milliGRAM(s) Oral daily  pantoprazole    Tablet 40 milliGRAM(s) Oral before breakfast  sodium chloride 0.9%. 1000 milliLiter(s) (100 mL/Hr) IV Continuous <Continuous>    MEDICATIONS  (PRN):  ketorolac   Injectable 15 milliGRAM(s) IV Push every 8 hours PRN Moderate Pain (4 - 6)    ___________  Active diet:  Diet, DASH/TLC:   Sodium & Cholesterol Restricted  Diet, NPO after Midnight:      NPO Start Date: 06-Aug-2023,   NPO Start Time: 23:59  ___________________    ==================>> VITAL SIGNS <<==================    T(C): 36.8 (08-07-23 @ 10:06), Max: 36.9 (08-07-23 @ 00:00)  HR: 52 (08-07-23 @ 10:06) (51 - 62)  BP: 139/90 (08-07-23 @ 10:06) (114/83 - 151/90)  BP(mean): 93 (08-06-23 @ 20:21)  RR: 18 (08-07-23 @ 10:06) (16 - 18)  SpO2: 98% (08-07-23 @ 10:06) (98% - 100%)      ==================>> LAB AND IMAGING <<==================       08-06    141  |  105  |  13  ----------------------------<  81  3.8   |  25  |  0.74    Ca    8.9      06 Aug 2023 04:32    TPro  7.4  /  Alb  4.1  /  TBili  0.5  /  DBili  x   /  AST  21  /  ALT  19  /  AlkPhos  98  08-06       AST:          21 <== , 20 <==      ALT:        19  <== , 23  <==      AP:        98  <=, 104  <=     Bili:        0.5  <=, 0.5  <=    Urinalysis Basic - ( 06 Aug 2023 04:32 )  Color: x / Appearance: x / SG: x / pH: x  Gluc: 81 mg/dL / Ketone: x  / Bili: x / Urobili: x   Blood: x / Protein: x / Nitrite: x   Leuk Esterase: x / RBC: x / WBC x   Sq Epi: x / Non Sq Epi: x / Bacteria: x    < from: MR MRCP w/wo IV Cont (08.06.23 @ 10:21) >  IMPRESSION:  Choledocholithiasis, minimal debris and/or tiny distal common bile duct   stones. No significant ductal dilatation.  Cholelithiasis.  Cirrhosis.  Post radioembolization changes as detailed above withoutevidence of  recurrent disease.  LI-RADS v 2018 Category: LR-TR Nonviable  < end of copied text >    ___________________________________________________________________________________  ===============>>  A S S E S S M E N T   A N D   P L A N <<===============  ------------------------------------------------------------------------------------------    · Assessment	  Patient is a 66yo Female with past medical history of HTN, HCV cirrhosis (tx'ed incevac and ribavirin) , history of HCC s/p Y90 (04/2022), gallstones presented to ED c/o right upper quadrant pain x 1 week a/w nausea.   Has chronic intermittent RUQ pain but started to experience burning RUQ pain radiating to right back starting last week which is worse with eating and lasts a few seconds.   saw PCP for symptoms prior evening who did a physical examination and asked patient to come to ED to rule out gallstones / acute jose cruz. Denies fever, vomiting, diarrhea, constipation, dysuria, hematuria, blood in stool.   In ED WBC 2.67 CMP/lipase normal, US noted cirrhosis and area of increased echogenicity and calcification of the right lobe, likely representing post-ablation changes, recommended MRI. CTAP noted cholelithiasis without cholecystitis, also noted possible choledocholithiasis with tiny 2 mm stone in the distal CBD. MRCP (8/6)- Minimal debris and/or tiny distal common bile duct stones. Advanced GI consulted for CBD stones, surgery consulted, patient admitted to medicine for further workup       Problem/Plan - 1:  ·  Problem: Choledocholithiasis.   ·  Plan: appreciated GI consult  for ERCP later today   monitor LFTs  NPO for procedure  Sx consult pending     Problem/Plan - 2:  ·  Problem: Cholelithiases.   ·  Plan: Sx consulted by ED :: pending evaluation   further plan pending ERCP  will get Echo in case patient needs Sx.    Problem/Plan - 3:  ·  Problem: Mild HTN.   ·  Plan: Continue home medications and monitor.  check Echo.    Problem/Plan - 4:  ·  Problem: history Cirrhosis.   stable  monitor    -GI/DVT Prophylaxis per protocol.    --------------------------------------------  Case discussed with patient, NP  Education given on findings and plan of care  ___________________________  H. CELENA Yates.  Pager: [phone]

## 2023-08-07 NOTE — ED ADULT NURSE REASSESSMENT NOTE - GENERAL PATIENT STATE
comfortable appearance/cooperative/improvement verbalized/resting/sleeping
comfortable appearance/cooperative/improvement verbalized/resting/sleeping

## 2023-08-07 NOTE — PRE PROCEDURE NOTE - PRE PROCEDURE EVALUATION
Pre-Endoscopy Evaluation    Attending Physician:  Dr. Field    Procedure: ERCP    Indication for Procedure & Pertinent History: See [employer] chart    PAST MEDICAL & SURGICAL HISTORY:  Hypertension      Hepatitis C  attempted treatment in past and again in 2012 with incevac and ribavirin - succesful treatement      Cirrhosis      Chronic Pancreatitis      Peptic ulcer disease  in past      GERD (gastroesophageal reflux disease)      Cervical arthritis      Torn ACL  right knee      Cholelithiases      H/O: Hysterectomy  h/o Fibroids, 1998          Allergies    No Known Allergies    Intolerances    aspirin (Other)      Medications: MEDICATIONS  (STANDING):  amLODIPine   Tablet 5 milliGRAM(s) Oral daily  hydrochlorothiazide 12.5 milliGRAM(s) Oral daily  indomethacin Suppository 100 milliGRAM(s) Rectal once  losartan 100 milliGRAM(s) Oral daily  pantoprazole    Tablet 40 milliGRAM(s) Oral before breakfast  sodium chloride 0.9%. 1000 milliLiter(s) (100 mL/Hr) IV Continuous <Continuous>    MEDICATIONS  (PRN):  ketorolac   Injectable 15 milliGRAM(s) IV Push every 8 hours PRN Moderate Pain (4 - 6)      Pertinent lab data:    08-06    141  |  105  |  13  ----------------------------<  81  3.8   |  25  |  0.74    Ca    8.9      06 Aug 2023 04:32    TPro  7.4  /  Alb  4.1  /  TBili  0.5  /  DBili  x   /  AST  21  /  ALT  19  /  AlkPhos  98  08-06                Physical Examination:  Daily Height in cm: 172.72 (07 Aug 2023 16:49)    Daily   Vital Signs Last 24 Hrs  T(C): 36.7 (07 Aug 2023 16:49), Max: 36.9 (07 Aug 2023 00:00)  T(F): 98.1 (07 Aug 2023 15:56), Max: 98.5 (07 Aug 2023 00:00)  HR: 54 (07 Aug 2023 16:49) (51 - 95)  BP: 164/83 (07 Aug 2023 16:49) (114/83 - 164/83)  BP(mean): 93 (07 Aug 2023 16:49) (93 - 93)  RR: 18 (07 Aug 2023 16:49) (16 - 18)  SpO2: 99% (07 Aug 2023 16:49) (98% - 100%)    Parameters below as of 07 Aug 2023 15:56  Patient On (Oxygen Delivery Method): room air      GENERAL: no acute distress  NEURO: alert  HEENT: NCAT, no conjunctival pallor appreciated  CHEST: no respiratory distress, no accessory muscle use  CARDIAC: regular rate, +S1/S2  ABDOMEN: soft, nontender, no rebound or guarding  EXTREMITIES: warm, well perfused  SKIN: no lesions noted    Comments:    ASA Class: I []  II []  III []  IV []    The patient is a suitable candidate for the planned procedure unless box checked [ ]  No, explain:    Note incomplete until finalized by attending signature/attestation.    Junaid Reynaga  GI/Hepatology Fellow    MONDAY-FRIDAY 8AM-5PM:  Pager# [phone] ([facility]) or [phone] ([facility])    NON-URGENT CONSULTS:  Please email [email or web address] OR [email or web address]  AT NIGHT AND ON WEEKENDS:  Contact on-call GI fellow via answering service ([phone]) from 5pm-8am and on weekends/holidays    
Attending Physician: Dr Field / Dr Polanco                           Procedure: ERCP    Indication for Procedure: choledocholithiasis w/o cholecystitis  ________________________________________________________  PAST MEDICAL & SURGICAL HISTORY:  Hypertension      Hepatitis C  attempted treatment in past and again in 2012 with incevac and ribavirin - succesful treatement      Cirrhosis      Chronic Pancreatitis      Peptic ulcer disease  in past      GERD (gastroesophageal reflux disease)      Cervical arthritis      Torn ACL  right knee      Cholelithiases      H/O: Hysterectomy  h/o Fibroids, 1998        ALLERGIES:  aspirin (Other)  No Known Allergies    HOME MEDICATIONS:  Centrum oral tablet: 1 tab(s) orally once a day  irbesartan-hydrochlorothiazide 300 mg-12.5 mg oral tablet: 1 tab(s) orally once a day  magnesium carbonate 250 mg oral capsule: 1 cap(s) orally once a day  Norvasc 5 mg oral tablet: 1 tab(s) orally once a day  selenium 100 mcg oral tablet: 1 tab(s) orally once a day  Vitamin C 1000 mg oral tablet: 1 tab(s) orally once a day  Vitamin D3 25 mcg (1000 intl units) oral tablet: 1 tab(s) orally once a day  Zinc 140 mg (as elemental zinc 50 mg) oral tablet: 1 tab(s) orally once a day    AICD/PPM: [ ] yes   [x ] no    PERTINENT LAB DATA:    08-06    141  |  105  |  13  ----------------------------<  81  3.8   |  25  |  0.74    Ca    8.9      06 Aug 2023 04:32    TPro  7.4  /  Alb  4.1  /  TBili  0.5  /  DBili  x   /  AST  21  /  ALT  19  /  AlkPhos  98  08-06                PHYSICAL EXAMINATION:    T(C): 36.7  HR: 95  BP: 141/81  RR: 18  SpO2: 98%    Constitutional: NAD    Neck:  No JVD  Respiratory: no respiratory distress   Cardiovascular: RRR  Extremities: No peripheral edema  Neurological: A/O x 3, no focal deficits        COMMENTS:    The patient is a suitable candidate for the planned procedure unless box checked [ ]  No, explain:

## 2023-08-07 NOTE — ED ADULT NURSE REASSESSMENT NOTE - COMFORT CARE
darkened lights/plan of care explained/side rails up/treatment delay explained/wait time explained/warm blanket provided
plan of care explained/wait time explained/warm blanket provided

## 2023-08-08 ENCOUNTER — TRANSCRIPTION ENCOUNTER (OUTPATIENT)
Age: 68
End: 2023-08-08

## 2023-08-08 LAB
ALBUMIN SERPL ELPH-MCNC: 4.2 G/DL — SIGNIFICANT CHANGE UP (ref 3.3–5)
ALP SERPL-CCNC: 93 U/L — SIGNIFICANT CHANGE UP (ref 40–120)
ALT FLD-CCNC: 18 U/L — SIGNIFICANT CHANGE UP (ref 10–45)
ANION GAP SERPL CALC-SCNC: 15 MMOL/L — SIGNIFICANT CHANGE UP (ref 5–17)
APPEARANCE UR: CLEAR — SIGNIFICANT CHANGE UP
AST SERPL-CCNC: 21 U/L — SIGNIFICANT CHANGE UP (ref 10–40)
BACTERIA # UR AUTO: NEGATIVE — SIGNIFICANT CHANGE UP
BILIRUB SERPL-MCNC: 0.5 MG/DL — SIGNIFICANT CHANGE UP (ref 0.2–1.2)
BILIRUB UR-MCNC: NEGATIVE — SIGNIFICANT CHANGE UP
BUN SERPL-MCNC: 13 MG/DL — SIGNIFICANT CHANGE UP (ref 7–23)
CALCIUM SERPL-MCNC: 9 MG/DL — SIGNIFICANT CHANGE UP (ref 8.4–10.5)
CHLORIDE SERPL-SCNC: 101 MMOL/L — SIGNIFICANT CHANGE UP (ref 96–108)
CO2 SERPL-SCNC: 22 MMOL/L — SIGNIFICANT CHANGE UP (ref 22–31)
COLOR SPEC: SIGNIFICANT CHANGE UP
CREAT SERPL-MCNC: 0.77 MG/DL — SIGNIFICANT CHANGE UP (ref 0.5–1.3)
DIFF PNL FLD: NEGATIVE — SIGNIFICANT CHANGE UP
EGFR: 83 ML/MIN/1.73M2 — SIGNIFICANT CHANGE UP
EPI CELLS # UR: 2 /HPF — SIGNIFICANT CHANGE UP
GLUCOSE SERPL-MCNC: 96 MG/DL — SIGNIFICANT CHANGE UP (ref 70–99)
GLUCOSE UR QL: NEGATIVE — SIGNIFICANT CHANGE UP
HCT VFR BLD CALC: 44.3 % — SIGNIFICANT CHANGE UP (ref 34.5–45)
HCV RNA FLD QL NAA+PROBE: SIGNIFICANT CHANGE UP
HCV RNA SPEC QL PROBE+SIG AMP: SIGNIFICANT CHANGE UP
HGB BLD-MCNC: 14.8 G/DL — SIGNIFICANT CHANGE UP (ref 11.5–15.5)
HYALINE CASTS # UR AUTO: 3 /LPF — HIGH (ref 0–2)
INR BLD: 1.24 RATIO — HIGH (ref 0.85–1.18)
KETONES UR-MCNC: ABNORMAL
LEUKOCYTE ESTERASE UR-ACNC: NEGATIVE — SIGNIFICANT CHANGE UP
MCHC RBC-ENTMCNC: 31.4 PG — SIGNIFICANT CHANGE UP (ref 27–34)
MCHC RBC-ENTMCNC: 33.4 GM/DL — SIGNIFICANT CHANGE UP (ref 32–36)
MCV RBC AUTO: 94.1 FL — SIGNIFICANT CHANGE UP (ref 80–100)
MELD SCORE WITH DIALYSIS: 22 POINTS — SIGNIFICANT CHANGE UP
MELD SCORE WITHOUT DIALYSIS: 9 POINTS — SIGNIFICANT CHANGE UP
NITRITE UR-MCNC: NEGATIVE — SIGNIFICANT CHANGE UP
NRBC # BLD: 0 /100 WBCS — SIGNIFICANT CHANGE UP (ref 0–0)
PH UR: 6 — SIGNIFICANT CHANGE UP (ref 5–8)
PLATELET # BLD AUTO: 124 K/UL — LOW (ref 150–400)
POTASSIUM SERPL-MCNC: 3.7 MMOL/L — SIGNIFICANT CHANGE UP (ref 3.5–5.3)
POTASSIUM SERPL-SCNC: 3.7 MMOL/L — SIGNIFICANT CHANGE UP (ref 3.5–5.3)
PROT SERPL-MCNC: 7.5 G/DL — SIGNIFICANT CHANGE UP (ref 6–8.3)
PROT UR-MCNC: NEGATIVE — SIGNIFICANT CHANGE UP
PROTHROM AB SERPL-ACNC: 13.6 SEC — HIGH (ref 9.5–13)
RBC # BLD: 4.71 M/UL — SIGNIFICANT CHANGE UP (ref 3.8–5.2)
RBC # FLD: 12.6 % — SIGNIFICANT CHANGE UP (ref 10.3–14.5)
RBC CASTS # UR COMP ASSIST: 0 /HPF — SIGNIFICANT CHANGE UP (ref 0–4)
SODIUM SERPL-SCNC: 138 MMOL/L — SIGNIFICANT CHANGE UP (ref 135–145)
SP GR SPEC: 1.01 — SIGNIFICANT CHANGE UP (ref 1.01–1.02)
UROBILINOGEN FLD QL: NEGATIVE — SIGNIFICANT CHANGE UP
WBC # BLD: 2.95 K/UL — LOW (ref 3.8–10.5)
WBC # FLD AUTO: 2.95 K/UL — LOW (ref 3.8–10.5)
WBC UR QL: 3 /HPF — SIGNIFICANT CHANGE UP (ref 0–5)

## 2023-08-08 RX ORDER — CHLORHEXIDINE GLUCONATE 213 G/1000ML
1 SOLUTION TOPICAL DAILY
Refills: 0 | Status: DISCONTINUED | OUTPATIENT
Start: 2023-08-08 | End: 2023-08-10

## 2023-08-08 RX ORDER — SENNA PLUS 8.6 MG/1
2 TABLET ORAL AT BEDTIME
Refills: 0 | Status: COMPLETED | OUTPATIENT
Start: 2023-08-08 | End: 2023-08-09

## 2023-08-08 RX ORDER — POLYETHYLENE GLYCOL 3350 17 G/17G
17 POWDER, FOR SOLUTION ORAL ONCE
Refills: 0 | Status: COMPLETED | OUTPATIENT
Start: 2023-08-08 | End: 2023-08-08

## 2023-08-08 RX ORDER — BENZOCAINE AND MENTHOL 5; 1 G/100ML; G/100ML
1 LIQUID ORAL EVERY 4 HOURS
Refills: 0 | Status: DISCONTINUED | OUTPATIENT
Start: 2023-08-08 | End: 2023-08-10

## 2023-08-08 RX ADMIN — SODIUM CHLORIDE 100 MILLILITER(S): 9 INJECTION INTRAMUSCULAR; INTRAVENOUS; SUBCUTANEOUS at 00:10

## 2023-08-08 RX ADMIN — CHLORHEXIDINE GLUCONATE 1 APPLICATION(S): 213 SOLUTION TOPICAL at 11:44

## 2023-08-08 RX ADMIN — AMLODIPINE BESYLATE 5 MILLIGRAM(S): 2.5 TABLET ORAL at 05:19

## 2023-08-08 RX ADMIN — POLYETHYLENE GLYCOL 3350 17 GRAM(S): 17 POWDER, FOR SOLUTION ORAL at 23:09

## 2023-08-08 RX ADMIN — PANTOPRAZOLE SODIUM 40 MILLIGRAM(S): 20 TABLET, DELAYED RELEASE ORAL at 05:19

## 2023-08-08 RX ADMIN — BENZOCAINE AND MENTHOL 1 LOZENGE: 5; 1 LIQUID ORAL at 11:42

## 2023-08-08 RX ADMIN — BENZOCAINE AND MENTHOL 1 LOZENGE: 5; 1 LIQUID ORAL at 23:51

## 2023-08-08 NOTE — CONSULT NOTE ADULT - ASSESSMENT
ASSESSMENT: 66yo Female with PMHx HTN, HCV cirrhosis (tx'ed incevac and ribavirin), history of HCC s/p Y90 (04/2022), gallstones, PSHx hysterectomy in 1998 presented to ED c/o right upper quadrant pain x 1 week. Imaging w/ findings of cholelithiasis and choledocholithiasis. Now s/p ERCP 8/7 with stone fragment removal via sphincterotomy, plastic pancreatic duct stent placed. Surgery consulted for cholelithiasis. Patient is potentially amenable to lap jose cruz; however, she has a son with learning disabilities at home and would like to get home to him for appointments later this week. She also would like to discuss the possibility of surgery with her family and GI doctor. Therefore, would like to postpone surgery at this time and schedule an office visit to discuss further.    RECOMMENDATIONS:  - Patient to follow up in office w/ Dr. Jay to discuss elective lap jose cruz  - Please include Dr. Jay's contact information in patient's discharge paperwork  - Rest of care per GI and primary team    Patient seen by and discussed with Dr. Jay.    Jennifer Menjivar, PGY2  [facility]  [phone]

## 2023-08-08 NOTE — PROGRESS NOTE ADULT - ASSESSMENT
66 y/o F with PMHx of HCV cirrhosis (tx'ed incevac and ribavirin) c/b S7/8 HCC s/p Y90 (04/2022), gallstones presents to ED c/o right upper quadrant pain x 1 week a/w nausea. Has chronic intermittent RUQ pain but started to experience burning RUQ pain radiating to right back starting last week which is worse with eating and lasts a few seconds. Advanced GI consulted for CBD stones.    Impression:  #choledocholithiasis w/o cholecystitis  #HCV cirrhosis  In ED WBC 2.67 CMP/lipase normal, US noted cirrhosis and area of increased echogenicity and calcification of the right lobe, likely representing post-ablation changes, recommended MRI. CTAP noted cholelithiasis without cholecystitis, also noted possible choledocholithiasis with tiny 2 mm stone in the distal CBD. MRCP (8/6)- Minimal debris and/or tiny distal common bile duct stones.   -EGD/ERCP 8/7/2023 with no evidence of varices, bile duct was deeply cannulated using a double wire technique, contrast was injected, stone fragments removed using sphincterotomy and balloon sweep, plastic single pigtail pancreatic duct stent placed to minimize risk of pancreatitis.    Recommendations:  -trend clinical symptoms, exam findings, vital signs, CBC, CMP, INR  -if patient remains asymptomatic and liver chemistries downtrending, advance diet as tolerated from GI perspective, however defer to surgery regarding timing of CCY  -no further plans for anticipated inpatient GI interventions  -recommend AXR in 4 weeks to assess if PD stent migrated; if not, will need endoscopy for PD stent removal  -will sign off at this time, please call back with questions or if new issues arise    Note incomplete until finalized by attending signature/attestation.    Junaid Reynaga  GI/Hepatology Fellow    MONDAY-FRIDAY 8AM-5PM:  Pager# [phone] ([facility]) or [phone] ([facility])    NON-URGENT CONSULTS:  Please email [email or web address] OR [email or web address]  AT NIGHT AND ON WEEKENDS:  Contact on-call GI fellow via answering service ([phone]) from 5pm-8am and on weekends/holidays

## 2023-08-08 NOTE — CONSULT NOTE ADULT - SUBJECTIVE AND OBJECTIVE BOX
GENERAL SURGERY CONSULT NOTE  --------------------------------------------------------------------------------------------  HPI:    66yo Female with PMHx HTN, HCV cirrhosis (tx'ed incevac and ribavirin), history of HCC s/p Y90 (2022), gallstones, PSHx hysterectomy in  presented to ED c/o right upper quadrant pain x 1 week a/w nausea. Has chronic intermittent RUQ pain but started to experience burning RUQ pain radiating to right back starting 1 week prior which is worse with eating and lasts a few seconds. She saw her PCP for symptoms prior evening who did a physical examination and asked patient to come to ED to rule out gallstones / acute jose cruz. Denies fever, vomiting, diarrhea, constipation, dysuria, hematuria, blood in stool.     In ED WBC 2.67 CMP/lipase normal, US noted cirrhosis and area of increased echogenicity and calcification of the right lobe, likely representing post-ablation changes, recommended MRI. CTAP noted cholelithiasis without cholecystitis, also noted possible choledocholithiasis with tiny 2 mm stone in the distal CBD. MRCP ()- Minimal debris and/or tiny distal common bile duct stones. S/p ERCP  with stone fragments removed with sphincterotomy and balloon sweep and plastic pancreatic duct stent placed to minimize pancreatitis risk.    Surgery consulted for cholelithiasis.      PAST MEDICAL & SURGICAL HISTORY:  Hypertension      Hepatitis C  attempted treatment in past and again in  with incevac and ribavirin - succesful treatement      Cirrhosis      Chronic Pancreatitis      Peptic ulcer disease  in past      GERD (gastroesophageal reflux disease)      Cervical arthritis      Torn ACL  right knee      Cholelithiases      H/O: Hysterectomy  h/o Fibroids,         FAMILY HISTORY:  No pertinent family history in first degree relatives    [] Family history not pertinent as reviewed with the patient and family    SOCIAL HISTORY:     ALLERGIES: aspirin (Other)  No Known Allergies      HOME MEDICATIONS:    CURRENT MEDICATIONS  MEDICATIONS (STANDING): amLODIPine   Tablet 5 milliGRAM(s) Oral daily  hydrochlorothiazide 12.5 milliGRAM(s) Oral daily  indomethacin Suppository 100 milliGRAM(s) Rectal once  losartan 100 milliGRAM(s) Oral daily  pantoprazole    Tablet 40 milliGRAM(s) Oral before breakfast  sodium chloride 0.9%. 1000 milliLiter(s) IV Continuous <Continuous>    MEDICATIONS (PRN):ketorolac   Injectable 15 milliGRAM(s) IV Push every 8 hours PRN Moderate Pain (4 - 6)    --------------------------------------------------------------------------------------------    Vitals:   T(C): 37.5 (23 @ 08:46), Max: 37.5 (23 @ 08:46)  HR: 53 (23 @ 08:46) (51 - 85)  BP: 134/82 (23 @ 08:46) (110/75 - 164/83)  RR: 18 (23 @ 08:46) (13 - 21)  SpO2: 97% (23 @ 08:46) (96% - 99%)  CAPILLARY BLOOD GLUCOSE          Height (cm): 172.7 ( @ 16:49)  Weight (kg): 77.1 ( 16:49)  BMI (kg/m2): 25.9 ( 16:49)  BSA (m2): 1.91 ( 16:49)      PHYSICAL EXAM:  General: NAD, Lying in bed comfortably  Neuro: Alert and answering questions appropriately   HEENT: Grossly normal, EOMI  Cardio: No peripherial edema  Resp: Good effort, no signs of respiratory distress  GI/Abd: Soft, NT/ND, no rebound/guarding, no masses palpated  Vascular: All 4 extremities warm  Musculoskeletal: All 4 extremities moving spontaneously, no limitations  --------------------------------------------------------------------------------------------    LABS  CBC ( 07:12)                              14.8                           2.95<L>  )----------------(  124<L>     --    % Neutrophils, --    % Lymphocytes, ANC: --                                  44.3      BMP ( 07:12)             138     |  101     |  13    		Ca++ --      Ca 9.0                ---------------------------------( 96    		Mg --                 3.7     |  22      |  0.77  			Ph --        LFTs ( 07:12)      TPro 7.5 / Alb 4.2 / TBili 0.5 / DBili -- / AST 21 / ALT 18 / AlkPhos 93    Coags ( 07:12)  aPTT -- / INR 1.24<H> / PT 13.6<H>          --------------------------------------------------------------------------------------------    MICROBIOLOGY  Urinalysis ( @ 07:56):     Color: Light Yellow / Appearance: Clear / S.013 / pH: 6.0 / Gluc: Negative / Ketones: Small<!> / Bili: Negative / Urobili: Negative / Protein :Negative / Nitrites: Negative / Leuk.Est: Negative / RBC: 0 / WBC: 3 / Sq Epi:  / Non Sq Epi:  / Bacteria Negative         --------------------------------------------------------------------------------------------    IMAGING  < from: MR MRCP w/wo IV Cont (23 @ 10:21) >  ACC: [id number] EXAM:  MR MRCP WAW IC   ORDERED BY: ALBER SANDHU     PROCEDURE DATE:  2023          INTERPRETATION:  CLINICAL INFORMATION: Right upper quadrant pain, rule   out choledocholithiasis. Patient has a history of hepatocellular   carcinoma status post segment 7/8 radio-embolization in 2022.    COMPARISON: CT of the abdomen and pelvis dated 2023, MRI of the   abdomen dated 2023, and MRI of the abdomen dated 2022    CONTRAST/COMPLICATIONS:  IV Contrast: Gadavist  9 cc administered   1 cc discarded  Oral Contrast: NONE  Complications: None reported at time of study completion    PROCEDURE:  MRI of the abdomen was performed.  MRCP was performed.    FINDINGS:  LOWER CHEST: Right lung base subsegmental atelectasis    LIVER: Cirrhotic. Stable post radio-embolization changes in the right   hepatic lobe centered segments 8, 5. No evidence of recurrent disease   (LR-TR nonviable). No new lesions.  BILE DUCTS: Minimal debris and/or tiny distal common bile duct stones   (11, 28-29). Top normal caliber, upper common bile duct 6 mm. Incidental   slightly low medial insertion of the cystic duct upon the common bile   duct.  GALLBLADDER: Cholelithiasis, few small caliber stones.  SPLEEN: Within normal limits.  PANCREAS: Within normal limits. No ductal dilatation. Stable 11 mm cystic   lesion posterior to or exophytic off the uncinate process (4, 29).  ADRENALS: Within normal limits.  KIDNEYS/URETERS: Bilateral renal cysts including a 14 mm septated left   lower pole cyst (23, 58).    VISUALIZED PORTIONS:  BOWEL: No obstruction.  PERITONEUM: No ascites.  VESSELS: Patent hepatic vasculature.  RETROPERITONEUM/LYMPH NODES: No lymphadenopathy.  ABDOMINAL WALL: Fat-containing umbilical hernia.  BONES: No aggressive osseous lesion.    IMPRESSION:    Choledocholithiasis, minimal debris and/or tiny distal common bile duct   stones. No significant ductal dilatation.    Cholelithiasis.    Cirrhosis.    Post radioembolization changes as detailed above withoutevidence of   recurrent disease.    LI-RADS v 2018 Category: LR-TR Nonviable    --- End of Report ---      < end of copied text >      --------------------------------------------------------------------------------------------

## 2023-08-08 NOTE — CONSULT NOTE ADULT - ATTENDING COMMENTS
DATE OF SERVICE: 08-08-23 @ 17:14    67F with hx of HCV s/p treatment, HCC s/p y90 tx ~1.5 years ago, here with RUQ pain for 1 week. Has known hx of gallstones, prior attack in 2016 and opted against surgery at that time. Pain was in RUQ and constant. Found to have choledocholithiasis on admission and underwent ERCP with stone removal yesterday. Surgery consulted for cholecystectomy     Vitals reviewed  Labs/imaging reviewed, s/p ERCP  CT with cirrhosis but no signs of portal HTN, no varices, no splenomegaly; EGD without varices   Abd soft NT/ND    Recommended inpatient lap jose cruz given hx of gallstone sx as well as episode of choledocholithiasis requiring ERCP. I explained that foregoing the procedure puts her at elevated risk of recurrent CBD stones and possible cholangitis which could result in severe infection/sepsis. She understood but wishes to follow up as an outpatient prior to any surgical procedure due to family issues as well as a desire to discuss her care with her PCP and outpatient liver team  She understood the risks/benefits and is opting against inpatient lap jose cruz at this time  She can fu as an outpt for elective cholecystectomy   d/w primary team
As above  Biliary type RUQ pain with MRCP notable for ?distal CBD sludge vs stone  Normal LFTs  Will plan for ERCP for duct clean out today 8/7  Keep NPO    Thank you for this interesting consult.  Please call the advanced GI service with any questions or concerns.

## 2023-08-08 NOTE — PROGRESS NOTE ADULT - ASSESSMENT
_________________________________________________________________________________________  ========>>  M E D I C A L   A T T E N D I N G    F O L L O W  U P  N O T E  <<=========  -----------------------------------------------------------------------------------------------------    - Patient seen and examined by me earlier today.   - In summary,  JAY FOREMAN is a 67y year old woman admitted with abd pain, choledocholithiasis, cholelithiasis   - Patient today overall doing ok, comfortable, NPO .. pain minimal     ==================>> REVIEW OF SYSTEM <<=================    GEN: no fever, no chills, minimal RUQ pain at times   RESP: no SOB, no cough, no sputum  CVS: no chest pain, no palpitations, no edema  GI:  abdominal pain as above , no nausea, no BM  : no dysuria, no frequency, no hematuria  Neuro: no headache, no dizziness  Derm : no itching, no rash    ==================>> PHYSICAL EXAM <<=================    GEN: A&O X 3 , NAD , comfortable, pleasant, calm   HEENT: NCAT, PERRL, MMM, hearing intact  Neck: supple , no JVD appreciated  CVS: S1S2 , regular , No M/R/G appreciated  PULM: CTA B/L,  no W/R/R appreciated  ABD.: soft. no significant tenderness, non distended,  bowel sounds present  Extrem: intact pulses , no edema   PSYCH : normal mood,  not anxious                              ( Note written / Date of service 08-08-23 )    ==================>> MEDICATIONS <<====================    amLODIPine   Tablet 5 milliGRAM(s) Oral daily  chlorhexidine 2% Cloths 1 Application(s) Topical daily  hydrochlorothiazide 12.5 milliGRAM(s) Oral daily  indomethacin Suppository 100 milliGRAM(s) Rectal once  losartan 100 milliGRAM(s) Oral daily  pantoprazole    Tablet 40 milliGRAM(s) Oral before breakfast  sodium chloride 0.9%. 1000 milliLiter(s) IV Continuous <Continuous>    MEDICATIONS  (PRN):  benzocaine/menthol Lozenge 1 Lozenge Oral every 4 hours PRN Sore Throat  ketorolac   Injectable 15 milliGRAM(s) IV Push every 8 hours PRN Moderate Pain (4 - 6)    ___________  Active diet:  Diet, DASH/TLC:   Sodium & Cholesterol Restricted  ___________________    ==================>> VITAL SIGNS <<==================  Height (cm): 172.7  Weight (kg): 77.1  BMI (kg/m2): 25.9  Vital Signs Last 24 HrsT(C): 37.5 (08-08-23 @ 08:46)  T(F): 99.5 (08-08-23 @ 08:46), Max: 99.5 (08-08-23 @ 08:46)  HR: 53 (08-08-23 @ 08:46) (51 - 85)  BP: 134/82 (08-08-23 @ 08:46)  RR: 18 (08-08-23 @ 08:46) (13 - 21)  SpO2: 97% (08-08-23 @ 08:46) (96% - 99%)      CAPILLARY BLOOD GLUCOSE         ==================>> LAB AND IMAGING <<==================                        14.8   2.95  )-----------( 124      ( 08 Aug 2023 07:12 )             44.3        08-08    138  |  101  |  13  ----------------------------<  96  3.7   |  22  |  0.77    Ca    9.0      08 Aug 2023 07:12    TPro  7.5  /  Alb  4.2  /  TBili  0.5  /  DBili  x   /  AST  21  /  ALT  18  /  AlkPhos  93  08-08    WBC count:   2.95 <<== ,  2.67 <<==   Hemoglobin:   14.8 <<==,  13.6 <<==  platelets:  124 <==, 121 <==    Creatinine:  0.77  <<==, 0.74  <<==, 0.78  <<==  Sodium:   138  <==, 141  <==, 139  <==       AST:          21 <== , 21 <== , 20 <==      ALT:        18  <== , 19  <== , 23  <==      AP:        93  <=, 98  <=, 104  <=     Bili:        0.5  <=, 0.5  <=, 0.5  <=    ____________________________    M I C R O B I O L O G Y :        < from: ERCP (08.07.23 @ 17:01) >  Findings:       EGD:       - A standard esophagogastroduodenoscopy scope was used for the examination of the upper        gastrointestinal tract. The scope was passed under direct vision through the upper GI tract.        The upper GI tract was normal without evidence of varices.       ERCP:       - The bile duct was deeply cannulated using a double wire technique with a 0.025in guidewire        and a 3.9F sphinctertome.       - Contrast was injected.       - Stone fragments removed using sphincterotomy and balloon sweep.       - Plastic single pigtail pancreatic duct stent placed to minimize risk of pancreatitis.                                   Impression:          EGD:                       - A standard esophagogastroduodenoscopy scope was used for the examination of                        the upper gastrointestinal tract. The scope was passed under direct vision                        through the upper GI tract. The upper GI tract was normal without evidence of                        varices.                       ERCP:                       - The bile duct was deeply cannulated using a double wire technique.                       - Contrast was injected.                       - Stone fragments removed using sphincterotomy and balloon sweep.                       - Plastic single pigtail pancreatic duct stent placed to minimize risk of             pancreatitis.  Recommendation:      - Return patient to [facility] for ongoing care.                       - Clear liquid diet.                       - If clinically improving tomorrow and liver chemistries normalizing, ok to              advance diet from GI perspective.                       - Appreciate general surgery recommendations regarding timing of                        cholecystectomy.                       - Will need abdominal plain film in 4 weeks. If PD stentremains, will need                        endoscopy with side viewing scope to remove PD stent.    < end of copied text >    < from: MR MRCP w/wo IV Cont (08.06.23 @ 10:21) >  IMPRESSION:  Choledocholithiasis, minimal debris and/or tiny distal common bile duct   stones. No significant ductal dilatation.  Cholelithiasis.  Cirrhosis.  Post radioembolization changes as detailed above withoutevidence of  recurrent disease.  LI-RADS v 2018 Category: LR-TR Nonviable  < end of copied text >    ___________________________________________________________________________________  ===============>>  A S S E S S M E N T   A N D   P L A N <<===============  ------------------------------------------------------------------------------------------    · Assessment	  Patient is a 68yo Female with past medical history of HTN, HCV cirrhosis (tx'ed incevac and ribavirin) , history of HCC s/p Y90 (04/2022), gallstones presented to ED c/o right upper quadrant pain x 1 week a/w nausea.   Has chronic intermittent RUQ pain but started to experience burning RUQ pain radiating to right back starting last week which is worse with eating and lasts a few seconds.   saw PCP for symptoms prior evening who did a physical examination and asked patient to come to ED to rule out gallstones / acute jose cruz. Denies fever, vomiting, diarrhea, constipation, dysuria, hematuria, blood in stool.   In ED WBC 2.67 CMP/lipase normal, US noted cirrhosis and area of increased echogenicity and calcification of the right lobe, likely representing post-ablation changes, recommended MRI. CTAP noted cholelithiasis without cholecystitis, also noted possible choledocholithiasis with tiny 2 mm stone in the distal CBD. MRCP (8/6)- Minimal debris and/or tiny distal common bile duct stones. Advanced GI consulted for CBD stones, surgery consulted, patient admitted to medicine for further workup       Problem/Plan - 1:  ·  Problem: Choledocholithiasis.   ·  Plan: appreciated GI consult  for ERCP later today   monitor LFTs  NPO for procedure  Sx consult pending     Problem/Plan - 2:  ·  Problem: Cholelithiases.   ·  Plan: Sx consulted by ED :: pending evaluation   further plan pending ERCP  will get Echo in case patient needs Sx.    Problem/Plan - 3:  ·  Problem: Mild HTN.   ·  Plan: Continue home medications and monitor.  check Echo.    Problem/Plan - 4:  ·  Problem: history Cirrhosis.   stable  monitor    -GI/DVT Prophylaxis per protocol.    --------------------------------------------  Case discussed with patient, NP  Education given on findings and plan of care  ___________________________  H. CELENA Yates.  Pager: [phone]       _________________________________________________________________________________________  ========>>  M E D I C A L   A T T E N D I N G    F O L L O W  U P  N O T E  <<=========  -----------------------------------------------------------------------------------------------------    - Patient seen and examined by me earlier today.   - In summary,  JAY FOREMAN is a 67y year old woman admitted with abd pain, choledocholithiasis, cholelithiasis   - Patient today overall doing ok, comfortable, post ERCP as bellow , on diet and tolerating so far     ==================>> REVIEW OF SYSTEM <<=================    GEN: no fever, no chills, minimal RUQ pain at times   RESP: no SOB, no cough, no sputum  CVS: no chest pain, no palpitations, no edema  GI:  abdominal pain as above , no nausea, no BM  : no dysuria, no frequency, no hematuria  Neuro: no headache, no dizziness  Derm : no itching, no rash    ==================>> PHYSICAL EXAM <<=================    GEN: A&O X 3 , NAD , comfortable, pleasant, calm   HEENT: NCAT, PERRL, MMM, hearing intact  Neck: supple , no JVD appreciated  CVS: S1S2 , regular , No M/R/G appreciated  PULM: CTA B/L,  no W/R/R appreciated  ABD.: soft. no significant tenderness, non distended,  bowel sounds present  Extrem: intact pulses , no edema   PSYCH : normal mood,  not anxious                              ( Note written / Date of service 08-08-23 )    ==================>> MEDICATIONS <<====================    amLODIPine   Tablet 5 milliGRAM(s) Oral daily  chlorhexidine 2% Cloths 1 Application(s) Topical daily  hydrochlorothiazide 12.5 milliGRAM(s) Oral daily  indomethacin Suppository 100 milliGRAM(s) Rectal once  losartan 100 milliGRAM(s) Oral daily  pantoprazole    Tablet 40 milliGRAM(s) Oral before breakfast  sodium chloride 0.9%. 1000 milliLiter(s) IV Continuous <Continuous>    MEDICATIONS  (PRN):  benzocaine/menthol Lozenge 1 Lozenge Oral every 4 hours PRN Sore Throat  ketorolac   Injectable 15 milliGRAM(s) IV Push every 8 hours PRN Moderate Pain (4 - 6)    ___________  Active diet:  Diet, DASH/TLC:   Sodium & Cholesterol Restricted  ___________________    ==================>> VITAL SIGNS <<==================    Height (cm): 172.7  Weight (kg): 77.1  BMI (kg/m2): 25.9  Vital Signs Last 24 HrsT(C): 37.5 (08-08-23 @ 08:46)  T(F): 99.5 (08-08-23 @ 08:46), Max: 99.5 (08-08-23 @ 08:46)  HR: 53 (08-08-23 @ 08:46) (51 - 85)  BP: 134/82 (08-08-23 @ 08:46)  RR: 18 (08-08-23 @ 08:46) (13 - 21)  SpO2: 97% (08-08-23 @ 08:46) (96% - 99%)       ==================>> LAB AND IMAGING <<==================                        14.8   2.95  )-----------( 124      ( 08 Aug 2023 07:12 )             44.3        08-08    138  |  101  |  13  ----------------------------<  96  3.7   |  22  |  0.77    Ca    9.0      08 Aug 2023 07:12    TPro  7.5  /  Alb  4.2  /  TBili  0.5  /  DBili  x   /  AST  21  /  ALT  18  /  AlkPhos  93  08-08    WBC count:   2.95 <<== ,  2.67 <<==   Hemoglobin:   14.8 <<==,  13.6 <<==  platelets:  124 <==, 121 <==    Creatinine:  0.77  <<==, 0.74  <<==, 0.78  <<==  Sodium:   138  <==, 141  <==, 139  <==       AST:          21 <== , 21 <== , 20 <==      ALT:        18  <== , 19  <== , 23  <==      AP:        93  <=, 98  <=, 104  <=     Bili:        0.5  <=, 0.5  <=, 0.5  <=        < from: ERCP (08.07.23 @ 17:01) >  Findings:       EGD:       - A standard esophagogastroduodenoscopy scope was used for the examination of the upper        gastrointestinal tract. The scope was passed under direct vision through the upper GI tract.        The upper GI tract was normal without evidence of varices.       ERCP:       - The bile duct was deeply cannulated using a double wire technique with a 0.025in guidewire        and a 3.9F sphinctertome.       - Contrast was injected.       - Stone fragments removed using sphincterotomy and balloon sweep.       - Plastic single pigtail pancreatic duct stent placed to minimize risk of pancreatitis.                                   Impression:          EGD:                       - A standard esophagogastroduodenoscopy scope was used for the examination of                        the upper gastrointestinal tract. The scope was passed under direct vision                        through the upper GI tract. The upper GI tract was normal without evidence of                        varices.                       ERCP:                       - The bile duct was deeply cannulated using a double wire technique.                       - Contrast was injected.                       - Stone fragments removed using sphincterotomy and balloon sweep.                       - Plastic single pigtail pancreatic duct stent placed to minimize risk of             pancreatitis.  Recommendation:      - Return patient to [facility] for ongoing care.                       - Clear liquid diet.                       - If clinically improving tomorrow and liver chemistries normalizing, ok to              advance diet from GI perspective.                       - Appreciate general surgery recommendations regarding timing of                        cholecystectomy.                       - Will need abdominal plain film in 4 weeks. If PD stentremains, will need                        endoscopy with side viewing scope to remove PD stent.    < end of copied text >    < from: MR MRCP w/wo IV Cont (08.06.23 @ 10:21) >  IMPRESSION:  Choledocholithiasis, minimal debris and/or tiny distal common bile duct   stones. No significant ductal dilatation.  Cholelithiasis.  Cirrhosis.  Post radioembolization changes as detailed above withoutevidence of  recurrent disease.  LI-RADS v 2018 Category: LR-TR Nonviable  < end of copied text >    < from: TTE Congenital Anomalies W or WO Ultrasound Enhancing Agent (08.07.23 @ 07:30) >  CONCLUSIONS:    1. Normal left ventricular cavity size. The left ventricular wall thickness is normal. The left ventricular systolic function is normal with an ejection fraction of 70 % by 3D. There are no regional wall motion abnormalities seen.   2. There is normal left ventricular diastolic function, with normal filling pressure.   3. Normal right ventricular cavity size, normal right ventricular wall thickness and normal right ventricular systolic function. The tricuspid annular plane systolic excursion (TAPSE) is 2.8 cm (normal >=1.7 cm).   4. There is trace tricuspid regurgitation. Estimated pulmonary artery systolic pressure is 18 mmHg.   5. No pericardial effusion seen.   6. Compared to the transthoracic echocardiogram performed on 4/11/2022 there have been no significant interval changes.  < end of copied text >    ___________________________________________________________________________________  ===============>>  A S S E S S M E N T   A N D   P L A N <<===============  ------------------------------------------------------------------------------------------    · Assessment	  Patient is a 66yo Female with past medical history of HTN, HCV cirrhosis (tx'ed incevac and ribavirin) , history of HCC s/p Y90 (04/2022), gallstones presented to ED c/o right upper quadrant pain x 1 week a/w nausea.   Has chronic intermittent RUQ pain but started to experience burning RUQ pain radiating to right back starting last week which is worse with eating and lasts a few seconds.   saw PCP for symptoms prior evening who did a physical examination and asked patient to come to ED to rule out gallstones / acute jose cruz. Denies fever, vomiting, diarrhea, constipation, dysuria, hematuria, blood in stool.   In ED WBC 2.67 CMP/lipase normal, US noted cirrhosis and area of increased echogenicity and calcification of the right lobe, likely representing post-ablation changes, recommended MRI. CTAP noted cholelithiasis without cholecystitis, also noted possible choledocholithiasis with tiny 2 mm stone in the distal CBD. MRCP (8/6)- Minimal debris and/or tiny distal common bile duct stones. Advanced GI consulted for CBD stones, surgery consulted, patient admitted to medicine for further workup       Problem/Plan - 1:  ·  Problem: Choledocholithiasis.   ·  Plan: appreciated GI   doing well post ERCP   monitor LFTs  diet as tolerated   Sx consult appreciated and discussed       thus far patient opting out against cholecystectomy despite conversations     Problem/Plan - 2:  ·  Problem: Cholelithiases.   as above   will need follow up with GI and Sx as noted regarding cholecystectomy and pancreatic duct stent     Problem/Plan - 3:  ·  Problem: Mild HTN.   ·  Plan: Continue home medications and monitor.  check Echo.    Problem/Plan - 4:  ·  Problem: history Cirrhosis.   stable  monitor    -GI/DVT Prophylaxis per protocol.    likely DC plan home om AM if stable ( unless decides to have cholecystectomy )     --------------------------------------------  Case discussed with patient, Juarez VEGAS  Education given on findings and plan of care  ___________________________  H. CELENA Yates.  Pager: [phone]

## 2023-08-09 LAB
ALBUMIN SERPL ELPH-MCNC: 4.4 G/DL — SIGNIFICANT CHANGE UP (ref 3.3–5)
ALP SERPL-CCNC: 89 U/L — SIGNIFICANT CHANGE UP (ref 40–120)
ALT FLD-CCNC: 21 U/L — SIGNIFICANT CHANGE UP (ref 10–45)
AST SERPL-CCNC: 22 U/L — SIGNIFICANT CHANGE UP (ref 10–40)
BILIRUB DIRECT SERPL-MCNC: 0.1 MG/DL — SIGNIFICANT CHANGE UP (ref 0–0.3)
BILIRUB INDIRECT FLD-MCNC: 0.3 MG/DL — SIGNIFICANT CHANGE UP (ref 0.2–1)
BILIRUB SERPL-MCNC: 0.4 MG/DL — SIGNIFICANT CHANGE UP (ref 0.2–1.2)
LIDOCAIN IGE QN: 65 U/L — HIGH (ref 7–60)
PROT SERPL-MCNC: 7.5 G/DL — SIGNIFICANT CHANGE UP (ref 6–8.3)

## 2023-08-09 PROCEDURE — 99232 SBSQ HOSP IP/OBS MODERATE 35: CPT | Mod: GC

## 2023-08-09 RX ORDER — CALCIUM CARBONATE 500(1250)
1 TABLET ORAL ONCE
Refills: 0 | Status: COMPLETED | OUTPATIENT
Start: 2023-08-09 | End: 2023-08-09

## 2023-08-09 RX ORDER — LACTULOSE 10 G/15ML
20 SOLUTION ORAL ONCE
Refills: 0 | Status: COMPLETED | OUTPATIENT
Start: 2023-08-09 | End: 2023-08-09

## 2023-08-09 RX ADMIN — CHLORHEXIDINE GLUCONATE 1 APPLICATION(S): 213 SOLUTION TOPICAL at 12:11

## 2023-08-09 RX ADMIN — Medication 15 MILLIGRAM(S): at 04:58

## 2023-08-09 RX ADMIN — BENZOCAINE AND MENTHOL 1 LOZENGE: 5; 1 LIQUID ORAL at 20:58

## 2023-08-09 RX ADMIN — Medication 1 TABLET(S): at 20:56

## 2023-08-09 RX ADMIN — SENNA PLUS 2 TABLET(S): 8.6 TABLET ORAL at 21:29

## 2023-08-09 RX ADMIN — PANTOPRAZOLE SODIUM 40 MILLIGRAM(S): 20 TABLET, DELAYED RELEASE ORAL at 05:28

## 2023-08-09 RX ADMIN — Medication 15 MILLIGRAM(S): at 03:58

## 2023-08-09 RX ADMIN — AMLODIPINE BESYLATE 5 MILLIGRAM(S): 2.5 TABLET ORAL at 05:28

## 2023-08-09 RX ADMIN — LACTULOSE 20 GRAM(S): 10 SOLUTION ORAL at 09:38

## 2023-08-09 NOTE — PROGRESS NOTE ADULT - ASSESSMENT
_________________________________________________________________________________________  ========>>  M E D I C A L   A T T E N D I N G    F O L L O W  U P  N O T E  <<=========  -----------------------------------------------------------------------------------------------------    - Patient seen and examined by me earlier today.   - In summary,  JAY FOREMAN is a 67y year old woman admitted with abd pain, choledocholithiasis, cholelithiasis   - Patient today overall doing ok, comfortable, post ERCP as bellow , on diet and tolerating so far     ==================>> REVIEW OF SYSTEM <<=================    GEN: no fever, no chills, minimal RUQ pain at times   RESP: no SOB, no cough, no sputum  CVS: no chest pain, no palpitations, no edema  GI:  abdominal pain as above , no nausea, no BM  : no dysuria, no frequency, no hematuria  Neuro: no headache, no dizziness  Derm : no itching, no rash    ==================>> PHYSICAL EXAM <<=================    GEN: A&O X 3 , NAD , comfortable, pleasant, calm   HEENT: NCAT, PERRL, MMM, hearing intact  Neck: supple , no JVD appreciated  CVS: S1S2 , regular , No M/R/G appreciated  PULM: CTA B/L,  no W/R/R appreciated  ABD.: soft. no significant tenderness, non distended,  bowel sounds present  Extrem: intact pulses , no edema   PSYCH : normal mood,  not anxious                               ( Note written / Date of service 08-09-23 )    ==================>> MEDICATIONS <<====================    amLODIPine   Tablet 5 milliGRAM(s) Oral daily  chlorhexidine 2% Cloths 1 Application(s) Topical daily  hydrochlorothiazide 12.5 milliGRAM(s) Oral daily  indomethacin Suppository 100 milliGRAM(s) Rectal once  losartan 100 milliGRAM(s) Oral daily  pantoprazole    Tablet 40 milliGRAM(s) Oral before breakfast  senna 2 Tablet(s) Oral at bedtime    MEDICATIONS  (PRN):  benzocaine/menthol Lozenge 1 Lozenge Oral every 4 hours PRN Sore Throat  ketorolac   Injectable 15 milliGRAM(s) IV Push every 8 hours PRN Moderate Pain (4 - 6)    ___________  Active diet:  Diet, DASH/TLC:   Sodium & Cholesterol Restricted  ___________________    ==================>> VITAL SIGNS <<==================  Height (cm): 172.7  Weight (kg): 77.1  BMI (kg/m2): 25.9  Vital Signs Last 24 HrsT(C): 36.3 (08-09-23 @ 15:56)  T(F): 97.4 (08-09-23 @ 15:56), Max: 99.1 (08-08-23 @ 18:30)  HR: 56 (08-09-23 @ 15:56) (53 - 62)  BP: 138/78 (08-09-23 @ 15:56)  RR: 18 (08-09-23 @ 15:56) (16 - 18)  SpO2: 96% (08-09-23 @ 15:56) (96% - 100%)      CAPILLARY BLOOD GLUCOSE         ==================>> LAB AND IMAGING <<==================                        14.8   2.95  )-----------( 124      ( 08 Aug 2023 07:12 )             44.3        08-08    138  |  101  |  13  ----------------------------<  96  3.7   |  22  |  0.77    Ca    9.0      08 Aug 2023 07:12    TPro  7.5  /  Alb  4.4  /  TBili  0.4  /  DBili  0.1  /  AST  22  /  ALT  21  /  AlkPhos  89  08-09    WBC count:   2.95 <<== ,  2.67 <<==   Hemoglobin:   14.8 <<==,  13.6 <<==  platelets:  124 <==, 121 <==    Creatinine:  0.77  <<==, 0.74  <<==, 0.78  <<==  Sodium:   138  <==, 141  <==, 139  <==       AST:          22 <== , 21 <== , 21 <== , 20 <==      ALT:        21  <== , 18  <== , 19  <== , 23  <==      AP:        89  <=, 93  <=, 98  <=, 104  <=     Bili:        0.4  <=, 0.5  <=, 0.5  <=, 0.5  <=    ____________________________    M I C R O B I O L O G Y :          < from: ERCP (08.07.23 @ 17:01) >  Findings:       EGD:       - A standard esophagogastroduodenoscopy scope was used for the examination of the upper        gastrointestinal tract. The scope was passed under direct vision through the upper GI tract.        The upper GI tract was normal without evidence of varices.       ERCP:       - The bile duct was deeply cannulated using a double wire technique with a 0.025in guidewire        and a 3.9F sphinctertome.       - Contrast was injected.       - Stone fragments removed using sphincterotomy and balloon sweep.       - Plastic single pigtail pancreatic duct stent placed to minimize risk of pancreatitis.                                   Impression:          EGD:                       - A standard esophagogastroduodenoscopy scope was used for the examination of                        the upper gastrointestinal tract. The scope was passed under direct vision                        through the upper GI tract. The upper GI tract was normal without evidence of                        varices.                       ERCP:                       - The bile duct was deeply cannulated using a double wire technique.                       - Contrast was injected.                       - Stone fragments removed using sphincterotomy and balloon sweep.                       - Plastic single pigtail pancreatic duct stent placed to minimize risk of             pancreatitis.  Recommendation:      - Return patient to [facility] for ongoing care.                       - Clear liquid diet.                       - If clinically improving tomorrow and liver chemistries normalizing, ok to              advance diet from GI perspective.                       - Appreciate general surgery recommendations regarding timing of                        cholecystectomy.                       - Will need abdominal plain film in 4 weeks. If PD stentremains, will need                        endoscopy with side viewing scope to remove PD stent.    < end of copied text >    < from: MR MRCP w/wo IV Cont (08.06.23 @ 10:21) >  IMPRESSION:  Choledocholithiasis, minimal debris and/or tiny distal common bile duct   stones. No significant ductal dilatation.  Cholelithiasis.  Cirrhosis.  Post radioembolization changes as detailed above withoutevidence of  recurrent disease.  LI-RADS v 2018 Category: LR-TR Nonviable  < end of copied text >    < from: TTE Congenital Anomalies W or WO Ultrasound Enhancing Agent (08.07.23 @ 07:30) >  CONCLUSIONS:    1. Normal left ventricular cavity size. The left ventricular wall thickness is normal. The left ventricular systolic function is normal with an ejection fraction of 70 % by 3D. There are no regional wall motion abnormalities seen.   2. There is normal left ventricular diastolic function, with normal filling pressure.   3. Normal right ventricular cavity size, normal right ventricular wall thickness and normal right ventricular systolic function. The tricuspid annular plane systolic excursion (TAPSE) is 2.8 cm (normal >=1.7 cm).   4. There is trace tricuspid regurgitation. Estimated pulmonary artery systolic pressure is 18 mmHg.   5. No pericardial effusion seen.   6. Compared to the transthoracic echocardiogram performed on 4/11/2022 there have been no significant interval changes.  < end of copied text >    ___________________________________________________________________________________  ===============>>  A S S E S S M E N T   A N D   P L A N <<===============  ------------------------------------------------------------------------------------------    · Assessment	  Patient is a 66yo Female with past medical history of HTN, HCV cirrhosis (tx'ed incevac and ribavirin) , history of HCC s/p Y90 (04/2022), gallstones presented to ED c/o right upper quadrant pain x 1 week a/w nausea.   Has chronic intermittent RUQ pain but started to experience burning RUQ pain radiating to right back starting last week which is worse with eating and lasts a few seconds.   saw PCP for symptoms prior evening who did a physical examination and asked patient to come to ED to rule out gallstones / acute jose cruz. Denies fever, vomiting, diarrhea, constipation, dysuria, hematuria, blood in stool.   In ED WBC 2.67 CMP/lipase normal, US noted cirrhosis and area of increased echogenicity and calcification of the right lobe, likely representing post-ablation changes, recommended MRI. CTAP noted cholelithiasis without cholecystitis, also noted possible choledocholithiasis with tiny 2 mm stone in the distal CBD. MRCP (8/6)- Minimal debris and/or tiny distal common bile duct stones. Advanced GI consulted for CBD stones, surgery consulted, patient admitted to medicine for further workup       Problem/Plan - 1:  ·  Problem: Choledocholithiasis.   ·  Plan: appreciated GI   doing well post ERCP   monitor LFTs  diet as tolerated   Sx consult appreciated and discussed       thus far patient opting out against cholecystectomy despite conversations     Problem/Plan - 2:  ·  Problem: Cholelithiases.   as above   will need follow up with GI and Sx as noted regarding cholecystectomy and pancreatic duct stent     Problem/Plan - 3:  ·  Problem: Mild HTN.   ·  Plan: Continue home medications and monitor.  check Echo.    Problem/Plan - 4:  ·  Problem: history Cirrhosis.   stable  monitor    -GI/DVT Prophylaxis per protocol.    likely DC plan home om AM if stable ( unless decides to have cholecystectomy )     --------------------------------------------  Case discussed with patient, PA, Sx  Education given on findings and plan of care  ___________________________  H. CELENA Yates.  Pager: [phone]       _________________________________________________________________________________________  ========>>  M E D I C A L   A T T E N D I N G    F O L L O W  U P  N O T E  <<=========  -----------------------------------------------------------------------------------------------------    - Patient seen and examined by me earlier today.   - In summary,  JAY FOREMAN is a 67y year old woman admitted with abd pain, choledocholithiasis, cholelithiasis   - Patient today overall doing ok, comfortable, post ERCP as bellow , on diet and tolerating so far     patient this morning had increased abd pain in t upper quadrant      no BM.. taking regimen , though declined suppository     ==================>> REVIEW OF SYSTEM <<=================    GEN: no fever, no chills, minimal RUQ pain at times   RESP: no SOB, no cough, no sputum  CVS: no chest pain, no palpitations, no edema  GI:  abdominal pain as above , no nausea, no BM  : no dysuria, no frequency, no hematuria  Neuro: no headache, no dizziness  Derm : no itching, no rash    ==================>> PHYSICAL EXAM <<=================    GEN: A&O X 3 , NAD , comfortable, pleasant, calm in bed... encouraged out of bed to chair and ambulate   HEENT: NCAT, PERRL, MMM, hearing intact  Neck: supple , no JVD appreciated  CVS: S1S2 , regular , No M/R/G appreciated  PULM: CTA B/L,  no W/R/R appreciated  ABD.: soft. no significant tenderness, non distended,  bowel sounds present  Extrem: intact pulses , no edema   PSYCH : normal mood,  not anxious                               ( Note written / Date of service 08-09-23 )    ==================>> MEDICATIONS <<====================    amLODIPine   Tablet 5 milliGRAM(s) Oral daily  chlorhexidine 2% Cloths 1 Application(s) Topical daily  hydrochlorothiazide 12.5 milliGRAM(s) Oral daily  indomethacin Suppository 100 milliGRAM(s) Rectal once  losartan 100 milliGRAM(s) Oral daily  pantoprazole    Tablet 40 milliGRAM(s) Oral before breakfast  senna 2 Tablet(s) Oral at bedtime    MEDICATIONS  (PRN):  benzocaine/menthol Lozenge 1 Lozenge Oral every 4 hours PRN Sore Throat  ketorolac   Injectable 15 milliGRAM(s) IV Push every 8 hours PRN Moderate Pain (4 - 6)    ___________  Active diet:  Diet, DASH/TLC:   Sodium & Cholesterol Restricted  ___________________    ==================>> VITAL SIGNS <<==================  Height (cm): 172.7  Weight (kg): 77.1  BMI (kg/m2): 25.9  Vital Signs Last 24 HrsT(C): 36.3 (08-09-23 @ 15:56)  T(F): 97.4 (08-09-23 @ 15:56), Max: 99.1 (08-08-23 @ 18:30)  HR: 56 (08-09-23 @ 15:56) (53 - 62)  BP: 138/78 (08-09-23 @ 15:56)  RR: 18 (08-09-23 @ 15:56) (16 - 18)  SpO2: 96% (08-09-23 @ 15:56) (96% - 100%)       ==================>> LAB AND IMAGING <<==================                        14.8   2.95  )-----------( 124      ( 08 Aug 2023 07:12 )             44.3        08-08    138  |  101  |  13  ----------------------------<  96  3.7   |  22  |  0.77    Ca    9.0      08 Aug 2023 07:12    TPro  7.5  /  Alb  4.4  /  TBili  0.4  /  DBili  0.1  /  AST  22  /  ALT  21  /  AlkPhos  89  08-09    WBC count:   2.95 <<== ,  2.67 <<==   Hemoglobin:   14.8 <<==,  13.6 <<==  platelets:  124 <==, 121 <==    Creatinine:  0.77  <<==, 0.74  <<==, 0.78  <<==  Sodium:   138  <==, 141  <==, 139  <==       AST:          22 <== , 21 <== , 21 <== , 20 <==      ALT:        21  <== , 18  <== , 19  <== , 23  <==      AP:        89  <=, 93  <=, 98  <=, 104  <=     Bili:        0.4  <=, 0.5  <=, 0.5  <=, 0.5  <=      < from: ERCP (08.07.23 @ 17:01) >  Findings:       EGD:       - A standard esophagogastroduodenoscopy scope was used for the examination of the upper        gastrointestinal tract. The scope was passed under direct vision through the upper GI tract.        The upper GI tract was normal without evidence of varices.       ERCP:       - The bile duct was deeply cannulated using a double wire technique with a 0.025in guidewire        and a 3.9F sphinctertome.       - Contrast was injected.       - Stone fragments removed using sphincterotomy and balloon sweep.       - Plastic single pigtail pancreatic duct stent placed to minimize risk of pancreatitis.                                   Impression:          EGD:                       - A standard esophagogastroduodenoscopy scope was used for the examination of                        the upper gastrointestinal tract. The scope was passed under direct vision                        through the upper GI tract. The upper GI tract was normal without evidence of                        varices.                       ERCP:                       - The bile duct was deeply cannulated using a double wire technique.                       - Contrast was injected.                       - Stone fragments removed using sphincterotomy and balloon sweep.                       - Plastic single pigtail pancreatic duct stent placed to minimize risk of             pancreatitis.  Recommendation:      - Return patient to [facility] for ongoing care.                       - Clear liquid diet.                       - If clinically improving tomorrow and liver chemistries normalizing, ok to              advance diet from GI perspective.                       - Appreciate general surgery recommendations regarding timing of                        cholecystectomy.                       - Will need abdominal plain film in 4 weeks. If PD stentremains, will need                        endoscopy with side viewing scope to remove PD stent.    < end of copied text >    < from: MR MRCP w/wo IV Cont (08.06.23 @ 10:21) >  IMPRESSION:  Choledocholithiasis, minimal debris and/or tiny distal common bile duct   stones. No significant ductal dilatation.  Cholelithiasis.  Cirrhosis.  Post radioembolization changes as detailed above withoutevidence of  recurrent disease.  LI-RADS v 2018 Category: LR-TR Nonviable  < end of copied text >    < from: TTE Congenital Anomalies W or WO Ultrasound Enhancing Agent (08.07.23 @ 07:30) >  CONCLUSIONS:    1. Normal left ventricular cavity size. The left ventricular wall thickness is normal. The left ventricular systolic function is normal with an ejection fraction of 70 % by 3D. There are no regional wall motion abnormalities seen.   2. There is normal left ventricular diastolic function, with normal filling pressure.   3. Normal right ventricular cavity size, normal right ventricular wall thickness and normal right ventricular systolic function. The tricuspid annular plane systolic excursion (TAPSE) is 2.8 cm (normal >=1.7 cm).   4. There is trace tricuspid regurgitation. Estimated pulmonary artery systolic pressure is 18 mmHg.   5. No pericardial effusion seen.   6. Compared to the transthoracic echocardiogram performed on 4/11/2022 there have been no significant interval changes.  < end of copied text >    ___________________________________________________________________________________  ===============>>  A S S E S S M E N T   A N D   P L A N <<===============  ------------------------------------------------------------------------------------------    · Assessment	  Patient is a 68yo Female with past medical history of HTN, HCV cirrhosis (tx'ed incevac and ribavirin) , history of HCC s/p Y90 (04/2022), gallstones presented to ED c/o right upper quadrant pain x 1 week a/w nausea.   Has chronic intermittent RUQ pain but started to experience burning RUQ pain radiating to right back starting last week which is worse with eating and lasts a few seconds.   saw PCP for symptoms prior evening who did a physical examination and asked patient to come to ED to rule out gallstones / acute jose cruz. Denies fever, vomiting, diarrhea, constipation, dysuria, hematuria, blood in stool.   In ED WBC 2.67 CMP/lipase normal, US noted cirrhosis and area of increased echogenicity and calcification of the right lobe, likely representing post-ablation changes, recommended MRI. CTAP noted cholelithiasis without cholecystitis, also noted possible choledocholithiasis with tiny 2 mm stone in the distal CBD. MRCP (8/6)- Minimal debris and/or tiny distal common bile duct stones. Advanced GI consulted for CBD stones, surgery consulted, patient admitted to medicine for further workup       Problem/Plan - 1:  ·  Problem: Choledocholithiasis.   ·  Plan: appreciated GI   doing well post ERCP   monitor LFTs  diet as tolerated   Sx appreciated and discussed       patient now agreeable and wants to have cholecystectomy done as inpatient     Problem/Plan - 2:  ·  Problem: Cholelithiases.   as above   will need follow up with GI and Sx as noted regarding cholecystectomy and pancreatic duct stent     Problem/Plan - 3:  ·  Problem: Mild HTN.   ·  Plan: Continue home medications and monitor.  check Echo.    Problem/Plan - 4:  ·  Problem: history Cirrhosis.   stable  monitor    -GI/DVT Prophylaxis per protocol.    likely DC plan home Friday post op if stable     --------------------------------------------  Case discussed with patient, , Sx  Education given on findings and plan of care  ___________________________  H. CELENA Yates.  Pager: [phone]

## 2023-08-09 NOTE — DISCHARGE NOTE PROVIDER - NSDCCAREPROVSEEN_GEN_ALL_CORE_FT
Patti Yates Patti Wall  Ordering Physician  [facility]  [facility]      [ Greater than 35 min spent for discharge services.   MOMO Yates ]       ( Note written / Date of service 08-24-23 ( This is certified to be the same as "ENTERED" date above ( for billing purposes)))

## 2023-08-09 NOTE — DISCHARGE NOTE PROVIDER - CARE PROVIDERS DIRECT ADDRESSES
,DirectAddress_Unknown,DirectAddress_Unknown,DirectAddress_Unknown,[email or web address]

## 2023-08-09 NOTE — PROGRESS NOTE ADULT - ATTENDING COMMENTS
Problem: Pain  Goal: #Acceptable pain level achieved/maintained at rest using NRS/Faces  Description: This goal is used for patients who can self-report.  Acceptable means the level is at or below the identified comfort/function goal.  Outcome: Outcome Met, Continue evaluating goal progress toward completion     Problem: VTE, Risk for  Goal: # No s/s of VTE  Outcome: Outcome Met, Continue evaluating goal progress toward completion     Problem: Diabetes  Goal: Verbalizes/demonstrates understanding of Diabetes  Description: Document on Patient Education Activity  Outcome: Outcome Met, Continue evaluating goal progress toward completion     Problem: Impaired Physical Mobility  Goal: # Bed mobility, ambulation, and ADLs are maintained or returned to baseline during hospitalization  Outcome: Outcome Met, Continue evaluating goal progress toward completion     Problem: Pressure Injury, Risk for  Goal: # Skin remains intact  Outcome: Outcome Met, Continue evaluating goal progress toward completion     Problem: Breathing Pattern Ineffective  Goal: Breathing pattern demonstrates minimal apnea during sleep with appropriate use of airway pressure support devices  Outcome: Outcome Met, Continue evaluating goal progress toward completion     Problem: Angina/Chest Pain  Goal: Anxiety is controlled  Outcome: Outcome Met, Continue evaluating goal progress toward completion     Problem: At Risk for Falls  Goal: # Takes action to control fall-related risks  Outcome: Outcome Met, Continue evaluating goal progress toward completion     Problem: At Risk for Injury Due to Fall  Goal: # Patient does not fall  Outcome: Outcome Met, Continue evaluating goal progress toward completion     Problem: Activity Intolerance  Goal: # Functional status is maintained or returned to baseline  Outcome: Outcome Met, Continue evaluating goal progress toward completion     Problem: Breathing Pattern Ineffective  Goal: Air exchange is effective,  demonstrated by Sp02 sat of greater then or = 92% (or as ordered)  Outcome: Outcome Not Met, Continue to Monitor      As above.    Impression:    #1.  Choledocholithiasis s/p removal by ERCP 8/8/23.   #2.  Pancreatic duct stent placed during ERCP to reduce risk of pancreatitis.  #3.  Abnormal LFTs, improved post ERCP  #4.  Abd pain, resolved now, near normal lipase, no evidence of post-ERCP pancreatitis  #5.  HCV cirrhosis, no varices on EGD, no significant ascites on CT scan, minimally elevated INR, mild thrombocytopenia.  Recommendations:    #1.  Surveillance and management of pancreatic duct stent as above.  #2.  Follow CBC/LFTs  #3.  Decision for cholecystectomy per surgical team.  #4.  GI office followup.

## 2023-08-09 NOTE — PROGRESS NOTE ADULT - ASSESSMENT
67F s/p ERCP for choledocholithiasis     Plan for Fri for lap jose cruz  higher risk due to hx of cirrhosis, explained to pt  NPO Mn thurs night  Pending medical optimization  D/w medical team

## 2023-08-09 NOTE — DISCHARGE NOTE PROVIDER - NSDCFUADDAPPT_GEN_ALL_CORE_FT
APPTS ARE READY TO BE MADE: [X ] YES    Best Family or Patient Contact (if needed):    Additional Information about above appointments (if needed):    1: Surgery  2: Hepatology (Jessi Torres NP)  3: PCP  4: GI    Other comments or requests:    APPTS ARE READY TO BE MADE: [X ] YES    Best Family or Patient Contact (if needed):    Additional Information about above appointments (if needed):    1: Surgery  2: Hepatology (Jessi Torres NP)  3: PCP  4: GI    Other comments or requests:       Providers Frank Mccabe, Dr. Leon, and NP Jessi Torres office was contacted to secure an appointment, however the office will follow up with the patient/caregiver directly.

## 2023-08-09 NOTE — DISCHARGE NOTE PROVIDER - HOSPITAL COURSE
Patient is a 66yo Female with past medical history of HTN, HCV cirrhosis (tx'ed incevac and ribavirin) , history of HCC s/p Y90 (04/2022), gallstones presented to ED c/o right upper quadrant pain x 1 week a/w nausea.   Has chronic intermittent RUQ pain but started to experience burning RUQ pain radiating to right back starting last week which is worse with eating and lasts a few seconds.   saw PCP for symptoms prior evening who did a physical examination and asked patient to come to ED to rule out gallstones / acute jose cruz. Denies fever, vomiting, diarrhea, constipation, dysuria, hematuria, blood in stool.   In ED WBC 2.67 CMP/lipase normal, US noted cirrhosis and area of increased echogenicity and calcification of the right lobe, likely representing post-ablation changes, recommended MRI. CTAP noted cholelithiasis without cholecystitis, also noted possible choledocholithiasis with tiny 2 mm stone in the distal CBD. MRCP (8/6)- Minimal debris and/or tiny distal common bile duct stones. Advanced GI consulted for CBD stones, surgery consulted, patient admitted to medicine for further workup      Choledocholithiasis.   status post ERCP   monitor LFTs  diet as tolerated   Sx consult appreciated and discussed -thus far patient opting out against cholecystectomy despite conversations       Cholelithiases.   will need follow up with GI and Sx as noted regarding cholecystectomy and pancreatic duct stent       Mild HTN.   Continue home medications and monitor.  check Echo.    history Cirrhosis.   stable  monitor      likely DC plan home om AM if stable ( unless decides to have cholecystectomy )      Patient is a 66yo Female with past medical history of HTN, HCV cirrhosis (tx'ed incevac and ribavirin) , history of HCC s/p Y90 (04/2022), gallstones presented to ED c/o right upper quadrant pain x 1 week a/w nausea.   Has chronic intermittent RUQ pain but started to experience burning RUQ pain radiating to right back starting last week which is worse with eating and lasts a few seconds.   saw PCP for symptoms prior evening who did a physical examination and asked patient to come to ED to rule out gallstones / acute jose cruz. Denies fever, vomiting, diarrhea, constipation, dysuria, hematuria, blood in stool.   In ED WBC 2.67 CMP/lipase normal, US noted cirrhosis and area of increased echogenicity and calcification of the right lobe, likely representing post-ablation changes, recommended MRI. CTAP noted cholelithiasis without cholecystitis, also noted possible choledocholithiasis with tiny 2 mm stone in the distal CBD. MRCP (8/6)- Minimal debris and/or tiny distal common bile duct stones. Advanced GI consulted for CBD stones, surgery consulted, patient admitted to medicine for further workup.    Choledocholithiasis.   status post ERCP   monitor LFTs  diet as tolerated   Sx consult appreciated and discussed -thus far patient opting out against cholecystectomy despite conversations       Cholelithiases.   will need follow up with GI and Sx as noted regarding cholecystectomy and pancreatic duct stent       Mild HTN.   Continue home medications and monitor.  check Echo.    history Cirrhosis.   stable  monitor      likely DC plan home om AM if stable ( unless decides to have cholecystectomy )      Patient is a 68yo Female with past medical history of HTN, HCV cirrhosis (tx'ed incevac and ribavirin) , history of HCC s/p Y90 (04/2022), gallstones presented to ED c/o right upper quadrant pain x 1 week a/w nausea.   Has chronic intermittent RUQ pain but started to experience burning RUQ pain radiating to right back starting last week which is worse with eating and lasts a few seconds.   saw PCP for symptoms prior evening who did a physical examination and asked patient to come to ED to rule out gallstones / acute jose cruz. Denies fever, vomiting, diarrhea, constipation, dysuria, hematuria, blood in stool.   In ED WBC 2.67 CMP/lipase normal, US noted cirrhosis and area of increased echogenicity and calcification of the right lobe, likely representing post-ablation changes, recommended MRI. CTAP noted cholelithiasis without cholecystitis, also noted possible choledocholithiasis with tiny 2 mm stone in the distal CBD. MRCP (8/6)- Minimal debris and/or tiny distal common bile duct stones. Advanced GI consulted for CBD stones, surgery consulted, patient admitted to medicine for further workup.    Choledocholithiasis.   status post ERCP, pancreatic duct stent placed during ERCP to reduce risk of pancreatitis.  monitor LFTs  diet as tolerated   Sx consult appreciated and discussed - thus far patient opting out against cholecystectomy despite conversations       Cholelithiases.   will need follow up with GI and Sx as noted regarding cholecystectomy and pancreatic duct stent       Mild HTN.   Continue home medications and monitor.  check Echo.    history Cirrhosis.   stable  monitor      likely DC plan home om AM if stable ( unless decides to have cholecystectomy )      Patient is a 68yo Female with past medical history of HTN, HCV cirrhosis (tx'ed incevac and ribavirin) , history of HCC s/p Y90 (04/2022), gallstones presented to ED c/o right upper quadrant pain x 1 week a/w nausea.   Has chronic intermittent RUQ pain but started to experience burning RUQ pain radiating to right back starting last week which is worse with eating and lasts a few seconds.   saw PCP for symptoms prior evening who did a physical examination and asked patient to come to ED to rule out gallstones / acute jose cruz. Denies fever, vomiting, diarrhea, constipation, dysuria, hematuria, blood in stool.   In ED WBC 2.67 CMP/lipase normal, US noted cirrhosis and area of increased echogenicity and calcification of the right lobe, likely representing post-ablation changes, recommended MRI. CTAP noted cholelithiasis without cholecystitis, also noted possible choledocholithiasis with tiny 2 mm stone in the distal CBD. MRCP (8/6)- Minimal debris and/or tiny distal common bile duct stones. Advanced GI consulted for CBD stones, surgery consulted, patient admitted to medicine for further workup.    Choledocholithiasis  status post ERCP, pancreatic duct stent placed during ERCP to reduce risk of pancreatitis.  monitored LFTs, improved s/p ERCP  diet as tolerated, advanced to reg diet without difficulty  Sx consult appreciated and discussed; patient wishes to discuss elective cholecystectomy outpatient     Cholelithiases  will need follow up with GI and Sx as noted regarding cholecystectomy and pancreatic duct stent     Mild HTN  Continue home medications and monitor  TTE reviewed     history Cirrhosis  stable  monitor  f/u outpatient hepatologist Jessi Torres    Hypokalemia  supplemented, f/u PCP in 1-3 days for repeat blood work and continued monitoring       Discharge/dispo/med rec discussed with Dr. Yates who determined patient stable and medically cleared for discharge home with prompt outpatient follow up.

## 2023-08-09 NOTE — DISCHARGE NOTE PROVIDER - CARE PROVIDER_API CALL
Srinivasan Jay (DO)  Surgery  [address], [address]  [place] [address]  Phone: [phone]  Fax: [phone]  Follow Up Time: 1 week    Jessi Torres  NP in Adult Health  [address] [place] [address]  Phone: [phone]  Fax: [phone]  Follow Up Time: 1 week    Tam Leon)  Internal Medicine  [address] [place] [address]  Phone: [phone]  Fax: [phone]  Follow Up Time: 1-3 days    Frank Barcenas  Gastroenterology  Division of Gastroenterology - [address] [place] [address]  Phone: [phone]  Fax: [phone]  Follow Up Time: 1 week

## 2023-08-09 NOTE — DISCHARGE NOTE PROVIDER - NSDCMRMEDTOKEN_GEN_ALL_CORE_FT
Centrum oral tablet: 1 tab(s) orally once a day  irbesartan-hydrochlorothiazide 300 mg-12.5 mg oral tablet: 1 tab(s) orally once a day  magnesium carbonate 250 mg oral capsule: 1 cap(s) orally once a day  Norvasc 5 mg oral tablet: 1 tab(s) orally once a day  selenium 100 mcg oral tablet: 1 tab(s) orally once a day  Vitamin C 1000 mg oral tablet: 1 tab(s) orally once a day  Vitamin D3 25 mcg (1000 intl units) oral tablet: 1 tab(s) orally once a day  Zinc 140 mg (as elemental zinc 50 mg) oral tablet: 1 tab(s) orally once a day

## 2023-08-09 NOTE — DISCHARGE NOTE PROVIDER - NSDCFUSCHEDAPPT_GEN_ALL_CORE_FT
Chrissie Finch  [facility]  HEPATOLOGY [address]  Scheduled Appointment: 10/02/2023    Jessi Torres  [facility]  HEPATOLOGY [address]  Scheduled Appointment: 10/11/2023    Analy Cordova  [facility]  DERM [address]  Scheduled Appointment: 10/20/2023     Frank Barcenas  [facility]  GASTRO [address]  Scheduled Appointment: 09/14/2023    Chrissie Finch  [facility]  HEPATOLOGY [address]  Scheduled Appointment: 10/02/2023    Jessi Torres  [facility]  HEPATOLOGY [address]  Scheduled Appointment: 10/11/2023    Analy Cordova  [facility] Physician [facility]  DERM [address]  Scheduled Appointment: 10/20/2023     [facility]  DIAGRAD [address]  Scheduled Appointment: 09/11/2023    Frank Barcenas  [facility]  GASTRO [address]  Scheduled Appointment: 09/14/2023    ALDO MASTERS  [facility]  GASTRO [address]  Scheduled Appointment: 09/28/2023    Jessi Torres  [facility]  HEPATOLOGY [address]  Scheduled Appointment: 10/11/2023    Analy Cordova  [facility]  DERM [address]  Scheduled Appointment: 10/20/2023

## 2023-08-09 NOTE — DISCHARGE NOTE PROVIDER - PROVIDER TOKENS
PROVIDER:[TOKEN:[[id number]],FOLLOWUP:[1 week]],PROVIDER:[TOKEN:[[id number]],FOLLOWUP:[1 week]],PROVIDER:[TOKEN:[[id number]],FOLLOWUP:[1-3 days]],PROVIDER:[TOKEN:[[id number]],FOLLOWUP:[1 week]]

## 2023-08-09 NOTE — PROGRESS NOTE ADULT - ASSESSMENT
68 y/o F with PMHx of HCV cirrhosis (tx'ed incevac and ribavirin) c/b S7/8 HCC s/p Y90 (04/2022), gallstones presents to ED c/o right upper quadrant pain x 1 week a/w nausea. Has chronic intermittent RUQ pain but started to experience burning RUQ pain radiating to right back starting last week which is worse with eating and lasts a few seconds. Advanced GI consulted for CBD stones.    Impression:  #choledocholithiasis w/o cholecystitis  #HCV cirrhosis  In ED WBC 2.67 CMP/lipase normal, US noted cirrhosis and area of increased echogenicity and calcification of the right lobe, likely representing post-ablation changes, recommended MRI. CTAP noted cholelithiasis without cholecystitis, also noted possible choledocholithiasis with tiny 2 mm stone in the distal CBD. MRCP (8/6)- Minimal debris and/or tiny distal common bile duct stones.   -EGD/ERCP 8/7/2023 with no evidence of varices, bile duct was deeply cannulated using a double wire technique, contrast was injected, stone fragments removed using sphincterotomy and balloon sweep, plastic single pigtail pancreatic duct stent placed to minimize risk of pancreatitis.    Recommendations:  -trend clinical symptoms, exam findings, vital signs, CBC, CMP, INR  -intermittent RUQ pain likely a function of symptomatic cholelithiasis  -if patient remains asymptomatic and liver chemistries downtrending, advance diet as tolerated from GI perspective, however defer to surgery regarding timing of CCY  -no further plans for anticipated inpatient GI interventions  -recommend AXR in 4 weeks to assess if PD stent migrated; if not, will need endoscopy for PD stent removal  -will sign off at this time, please call back with questions or if new issues arise    Note incomplete until finalized by attending signature/attestation.    Junaid Reynaga  GI/Hepatology Fellow    MONDAY-FRIDAY 8AM-5PM:  Pager# [phone] ([facility]) or [phone] ([facility])    NON-URGENT CONSULTS:  Please email [email or web address]  AT NIGHT AND ON WEEKENDS:  Contact on-call GI fellow via answering service ([phone]) from 5pm-8am and on weekends/holidays

## 2023-08-09 NOTE — CHART NOTE - NSCHARTNOTEFT_GEN_A_CORE
During examination, patient complaining of persistent epigastric abdominal pain with radiation to RUQ and back. Reports this pain During examination, patient complaining of persistent epigastric abdominal pain with radiation to RUQ and back. Reports this pain is similar to her pain prior to arrival. Also with constipation, reports some flatulence. Patient denies fever/chills, SOB, CP, n/v/d, dizziness/lightheadedness or any other complaints at this time.     Vital Signs Last 24 Hrs  T(C): 36.3 (09 Aug 2023 15:56), Max: 37.3 (08 Aug 2023 18:30)  T(F): 97.4 (09 Aug 2023 15:56), Max: 99.1 (08 Aug 2023 18:30)  HR: 56 (09 Aug 2023 15:56) (53 - 62)  BP: 138/78 (09 Aug 2023 15:56) (119/80 - 144/87)  BP(mean): --  RR: 18 (09 Aug 2023 15:56) (16 - 18)  SpO2: 96% (09 Aug 2023 15:56) (96% - 100%)    Parameters below as of 09 Aug 2023 15:56  Patient On (Oxygen Delivery Method): room air    A/P: 66 y/o F with PMHx of HCV cirrhosis (tx'ed incevac and ribavirin) c/b S7/8 HCC s/p Y90 (04/2022), gallstones presents to ED c/o right upper quadrant pain x 1 week a/w nausea. Found to have choledocholithiasis s/p ERCP on 8/7. Advanced GI following. Discussed with Dr. Yates, plan as below.    > f/u AM labs, including lipase r/o pancreatitis s/p ERCP  > Lactulose 20 g x 1 for constipation, encourage ambulation  > continue pain control as needed  > continue to monitor closely     Vicky Morrow PA-C    Addendum #1: Patient ambulating around floor, reports pain has improved but comes and goes occasionally. Continued flatulence but has not yet had bowel movement. Surgery plans for lap jose cruz on 8/11. Discussed with Dr. Yates, continue to monitor closely.

## 2023-08-09 NOTE — DISCHARGE NOTE PROVIDER - NSDCCPCAREPLAN_GEN_ALL_CORE_FT
PRINCIPAL DISCHARGE DIAGNOSIS  Diagnosis: Choledocholithiasis  Assessment and Plan of Treatment: HOME CARE INSTRUCTIONS  Home care will depend on your treatment. In general:  If you were given antibiotics, take them as directed. Finish them even if you start to feel better.   Only take over-the-counter or prescription medicines for pain, discomfort, or fever as directed by your caregiver.   Follow a low-fat diet until you see your caregiver again.   Keep all follow-up visits as directed by your caregiver.   SEEK IMMEDIATE MEDICAL CARE IF:  Your pain is increasing and not controlled by medicines.  Your pain moves to another part of your abdomen or to your back.   You have a fever.  You have nausea and vomiting        SECONDARY DISCHARGE DIAGNOSES  Diagnosis: Abdominal pain  Assessment and Plan of Treatment:     Diagnosis: Cirrhosis  Assessment and Plan of Treatment: Cirrhosis is scarring of the liver which can cause heavy bleeding, swelling, & breathing problems  Call your doctor with belly & leg swelling, shortness of breath, bruising or bleeding easily, feeling full, tired, trouble getting enough or too much sleep, yellowing of skin or whites of eye, sudden confusion, or coma  Causes may include heavy alcohol use, hepatitis B or C, or fatty liver.    You can help yourself by avoiding alcohol, speak with your doctor before starting on any new medication, herbs, vitamins, or supplements which may cause more damage to the liver  Treatment includes treating the cause, reducing blood pressure, low salt diet, diuretics, belly fluid drainage, treating infections, getting vaccinations to prevent common infections, &/or lactulose to improve confusion  It is important to get help if you have an alcohol problem, use condoms when having sex, and nor sharing drug needles if this applies to you      Diagnosis: Cholelithiases  Assessment and Plan of Treatment: Gallstones are stones in the gallbladder that clog the gallbladder from draining bile to break down fatty foods  Call your doctor if you have belly pain usually on right side under rib cage, pain in back or right shoulder, nausea & vominting to prevent serious rare complications tat can occur, like infection, gallbladder tears, and pancreas inflammation  You will get treatment if you have symptoms, like surgery to remove gallbladder or treatment to get rid of the stones  You can help yourself from getting more gallstones by keeping a healthy weight since overweight people are more likely to get gallstones  Losing weight too quicly with weight loss surgery can lead to gallstones so speak with your doctor about appropriate options      Diagnosis: Mild HTN  Assessment and Plan of Treatment: Low salt diet  Activity as tolerated.  Take all medication as prescribed.  Follow up with your medical doctor for routine blood pressure monitoring at your next visit.  Notify your doctor if you have any of the following symptoms:   Dizziness, Lightheadedness, Blurry vision, Headache, Chest pain, Shortness of breath       PRINCIPAL DISCHARGE DIAGNOSIS  Diagnosis: Choledocholithiasis  Assessment and Plan of Treatment: You had ERCP procedure completed during your hospital stay where a pancreatic duct stent was placed.  Follow up with GI for continued care and management.   ** Please obtain an Abdominal X-Ray in 4 weeks to assess if PD stent migrated; if not, will need endoscopy for PD stent removal  HOME CARE INSTRUCTIONS  Home care will depend on your treatment. In general:  If you were given antibiotics, take them as directed. Finish them even if you start to feel better.   Only take over-the-counter or prescription medicines for pain, discomfort, or fever as directed by your caregiver.   Follow a low-fat diet until you see your caregiver again.   Keep all follow-up visits as directed by your caregiver.   SEEK IMMEDIATE MEDICAL CARE IF:  Your pain is increasing and not controlled by medicines.  Your pain moves to another part of your abdomen or to your back.   You have a fever.  You have nausea and vomiting        SECONDARY DISCHARGE DIAGNOSES  Diagnosis: Mild HTN  Assessment and Plan of Treatment: Low salt diet  Activity as tolerated.  Take all medication as prescribed.  Follow up with your medical doctor for routine blood pressure monitoring at your next visit.  Notify your doctor if you have any of the following symptoms:   Dizziness, Lightheadedness, Blurry vision, Headache, Chest pain, Shortness of breath      Diagnosis: Cirrhosis  Assessment and Plan of Treatment: Please promptly follow up with your hepatologist for continued care.   Cirrhosis is scarring of the liver which can cause heavy bleeding, swelling, & breathing problems  Call your doctor with belly & leg swelling, shortness of breath, bruising or bleeding easily, feeling full, tired, trouble getting enough or too much sleep, yellowing of skin or whites of eye, sudden confusion, or coma  Causes may include heavy alcohol use, hepatitis B or C, or fatty liver.    You can help yourself by avoiding alcohol, speak with your doctor before starting on any new medication, herbs, vitamins, or supplements which may cause more damage to the liver  Treatment includes treating the cause, reducing blood pressure, low salt diet, diuretics, belly fluid drainage, treating infections, getting vaccinations to prevent common infections, &/or lactulose to improve confusion  It is important to get help if you have an alcohol problem, use condoms when having sex, and nor sharing drug needles if this applies to you      Diagnosis: Abdominal pain  Assessment and Plan of Treatment: Improved. Please promptly follow up with Dr. Jay to discuss elective cholecystectomy outpatient    Diagnosis: Cholelithiases  Assessment and Plan of Treatment: Gallstones are stones in the gallbladder that clog the gallbladder from draining bile to break down fatty foods  Call your doctor if you have belly pain usually on right side under rib cage, pain in back or right shoulder, nausea & vominting to prevent serious rare complications tat can occur, like infection, gallbladder tears, and pancreas inflammation  You will get treatment if you have symptoms, like surgery to remove gallbladder or treatment to get rid of the stones  You can help yourself from getting more gallstones by keeping a healthy weight since overweight people are more likely to get gallstones  Losing weight too quicly with weight loss surgery can lead to gallstones so speak with your doctor about appropriate options      Diagnosis: Hypokalemia  Assessment and Plan of Treatment: Supplemented.  Please follow up with your PCP in 1-3 days for repeat blood work and continued monitoring and care

## 2023-08-10 ENCOUNTER — TRANSCRIPTION ENCOUNTER (OUTPATIENT)
Age: 68
End: 2023-08-10

## 2023-08-10 ENCOUNTER — RESULT REVIEW (OUTPATIENT)
Age: 68
End: 2023-08-10

## 2023-08-10 VITALS
DIASTOLIC BLOOD PRESSURE: 82 MMHG | HEART RATE: 60 BPM | OXYGEN SATURATION: 98 % | TEMPERATURE: 98 F | RESPIRATION RATE: 18 BRPM | SYSTOLIC BLOOD PRESSURE: 125 MMHG

## 2023-08-10 DIAGNOSIS — Z96.89 PRESENCE OF OTHER SPECIFIED FUNCTIONAL IMPLANTS: ICD-10-CM

## 2023-08-10 LAB
ALBUMIN SERPL ELPH-MCNC: 4 G/DL — SIGNIFICANT CHANGE UP (ref 3.3–5)
ALP SERPL-CCNC: 81 U/L — SIGNIFICANT CHANGE UP (ref 40–120)
ALT FLD-CCNC: 19 U/L — SIGNIFICANT CHANGE UP (ref 10–45)
ANION GAP SERPL CALC-SCNC: 12 MMOL/L — SIGNIFICANT CHANGE UP (ref 5–17)
AST SERPL-CCNC: 20 U/L — SIGNIFICANT CHANGE UP (ref 10–40)
BILIRUB SERPL-MCNC: 0.4 MG/DL — SIGNIFICANT CHANGE UP (ref 0.2–1.2)
BUN SERPL-MCNC: 14 MG/DL — SIGNIFICANT CHANGE UP (ref 7–23)
CALCIUM SERPL-MCNC: 9.1 MG/DL — SIGNIFICANT CHANGE UP (ref 8.4–10.5)
CHLORIDE SERPL-SCNC: 102 MMOL/L — SIGNIFICANT CHANGE UP (ref 96–108)
CO2 SERPL-SCNC: 27 MMOL/L — SIGNIFICANT CHANGE UP (ref 22–31)
CREAT SERPL-MCNC: 0.83 MG/DL — SIGNIFICANT CHANGE UP (ref 0.5–1.3)
EGFR: 76 ML/MIN/1.73M2 — SIGNIFICANT CHANGE UP
GLUCOSE SERPL-MCNC: 81 MG/DL — SIGNIFICANT CHANGE UP (ref 70–99)
HCT VFR BLD CALC: 38.7 % — SIGNIFICANT CHANGE UP (ref 34.5–45)
HGB BLD-MCNC: 12.9 G/DL — SIGNIFICANT CHANGE UP (ref 11.5–15.5)
LIDOCAIN IGE QN: 46 U/L — SIGNIFICANT CHANGE UP (ref 7–60)
MAGNESIUM SERPL-MCNC: 1.9 MG/DL — SIGNIFICANT CHANGE UP (ref 1.6–2.6)
MCHC RBC-ENTMCNC: 31.5 PG — SIGNIFICANT CHANGE UP (ref 27–34)
MCHC RBC-ENTMCNC: 33.3 GM/DL — SIGNIFICANT CHANGE UP (ref 32–36)
MCV RBC AUTO: 94.6 FL — SIGNIFICANT CHANGE UP (ref 80–100)
NRBC # BLD: 0 /100 WBCS — SIGNIFICANT CHANGE UP (ref 0–0)
PLATELET # BLD AUTO: 109 K/UL — LOW (ref 150–400)
POTASSIUM SERPL-MCNC: 3.2 MMOL/L — LOW (ref 3.5–5.3)
POTASSIUM SERPL-SCNC: 3.2 MMOL/L — LOW (ref 3.5–5.3)
PROT SERPL-MCNC: 6.8 G/DL — SIGNIFICANT CHANGE UP (ref 6–8.3)
RBC # BLD: 4.09 M/UL — SIGNIFICANT CHANGE UP (ref 3.8–5.2)
RBC # FLD: 12.7 % — SIGNIFICANT CHANGE UP (ref 10.3–14.5)
SARS-COV-2 RNA SPEC QL NAA+PROBE: SIGNIFICANT CHANGE UP
SODIUM SERPL-SCNC: 141 MMOL/L — SIGNIFICANT CHANGE UP (ref 135–145)
WBC # BLD: 3.68 K/UL — LOW (ref 3.8–10.5)
WBC # FLD AUTO: 3.68 K/UL — LOW (ref 3.8–10.5)

## 2023-08-10 PROCEDURE — 99285 EMERGENCY DEPT VISIT HI MDM: CPT | Mod: 25

## 2023-08-10 PROCEDURE — 86803 HEPATITIS C AB TEST: CPT

## 2023-08-10 PROCEDURE — 85025 COMPLETE CBC W/AUTO DIFF WBC: CPT

## 2023-08-10 PROCEDURE — 76705 ECHO EXAM OF ABDOMEN: CPT

## 2023-08-10 PROCEDURE — 82803 BLOOD GASES ANY COMBINATION: CPT

## 2023-08-10 PROCEDURE — C1769: CPT

## 2023-08-10 PROCEDURE — 74177 CT ABD & PELVIS W/CONTRAST: CPT | Mod: MG

## 2023-08-10 PROCEDURE — 84295 ASSAY OF SERUM SODIUM: CPT

## 2023-08-10 PROCEDURE — 76376 3D RENDER W/INTRP POSTPROCES: CPT

## 2023-08-10 PROCEDURE — 83735 ASSAY OF MAGNESIUM: CPT

## 2023-08-10 PROCEDURE — 74183 MRI ABD W/O CNTR FLWD CNTR: CPT | Mod: MG

## 2023-08-10 PROCEDURE — 74330 X-RAY BILE/PANC ENDOSCOPY: CPT

## 2023-08-10 PROCEDURE — 85027 COMPLETE CBC AUTOMATED: CPT

## 2023-08-10 PROCEDURE — 82330 ASSAY OF CALCIUM: CPT

## 2023-08-10 PROCEDURE — 83605 ASSAY OF LACTIC ACID: CPT

## 2023-08-10 PROCEDURE — 85014 HEMATOCRIT: CPT

## 2023-08-10 PROCEDURE — 82947 ASSAY GLUCOSE BLOOD QUANT: CPT

## 2023-08-10 PROCEDURE — 87521 HEPATITIS C PROBE&RVRS TRNSC: CPT

## 2023-08-10 PROCEDURE — A9585: CPT

## 2023-08-10 PROCEDURE — 36415 COLL VENOUS BLD VENIPUNCTURE: CPT

## 2023-08-10 PROCEDURE — 81001 URINALYSIS AUTO W/SCOPE: CPT

## 2023-08-10 PROCEDURE — 96374 THER/PROPH/DIAG INJ IV PUSH: CPT

## 2023-08-10 PROCEDURE — 82435 ASSAY OF BLOOD CHLORIDE: CPT

## 2023-08-10 PROCEDURE — 85018 HEMOGLOBIN: CPT

## 2023-08-10 PROCEDURE — 84132 ASSAY OF SERUM POTASSIUM: CPT

## 2023-08-10 PROCEDURE — 80076 HEPATIC FUNCTION PANEL: CPT

## 2023-08-10 PROCEDURE — C2625: CPT

## 2023-08-10 PROCEDURE — 83690 ASSAY OF LIPASE: CPT

## 2023-08-10 PROCEDURE — C9399: CPT

## 2023-08-10 PROCEDURE — 80053 COMPREHEN METABOLIC PANEL: CPT

## 2023-08-10 PROCEDURE — 87635 SARS-COV-2 COVID-19 AMP PRB: CPT

## 2023-08-10 PROCEDURE — G1004: CPT

## 2023-08-10 RX ORDER — POTASSIUM CHLORIDE 20 MEQ
40 PACKET (EA) ORAL ONCE
Refills: 0 | Status: COMPLETED | OUTPATIENT
Start: 2023-08-10 | End: 2023-08-10

## 2023-08-10 RX ADMIN — PANTOPRAZOLE SODIUM 40 MILLIGRAM(S): 20 TABLET, DELAYED RELEASE ORAL at 05:56

## 2023-08-10 RX ADMIN — Medication 15 MILLIGRAM(S): at 00:20

## 2023-08-10 RX ADMIN — Medication 40 MILLIEQUIVALENT(S): at 11:32

## 2023-08-10 RX ADMIN — AMLODIPINE BESYLATE 5 MILLIGRAM(S): 2.5 TABLET ORAL at 05:55

## 2023-08-10 RX ADMIN — CHLORHEXIDINE GLUCONATE 1 APPLICATION(S): 213 SOLUTION TOPICAL at 11:33

## 2023-08-10 RX ADMIN — Medication 15 MILLIGRAM(S): at 00:04

## 2023-08-10 NOTE — DISCHARGE NOTE NURSING/CASE MANAGEMENT/SOCIAL WORK - PATIENT PORTAL LINK FT
You can access the FollowMyHealth Patient Portal offered by [facility] by registering at the following website: [email or web address]. By joining [facility]’s FollowMyHealth portal, you will also be able to view your health information using other applications (apps) compatible with our system.

## 2023-08-10 NOTE — PROGRESS NOTE ADULT - SUBJECTIVE AND OBJECTIVE BOX
DATE OF SERVICE: 23 @ 14:14    INTERVAL HPI/OVERNIGHT EVENTS:  Patient with mild RUQ pain and constipation, more amenable to surgery now    MEDICATIONS  (STANDING):  amLODIPine   Tablet 5 milliGRAM(s) Oral daily  chlorhexidine 2% Cloths 1 Application(s) Topical daily  hydrochlorothiazide 12.5 milliGRAM(s) Oral daily  indomethacin Suppository 100 milliGRAM(s) Rectal once  losartan 100 milliGRAM(s) Oral daily  pantoprazole    Tablet 40 milliGRAM(s) Oral before breakfast  senna 2 Tablet(s) Oral at bedtime  sodium chloride 0.9%. 1000 milliLiter(s) (100 mL/Hr) IV Continuous <Continuous>    MEDICATIONS  (PRN):  benzocaine/menthol Lozenge 1 Lozenge Oral every 4 hours PRN Sore Throat  ketorolac   Injectable 15 milliGRAM(s) IV Push every 8 hours PRN Moderate Pain (4 - 6)      Vital Signs Last 24 Hrs  T(C): 36.7 (09 Aug 2023 09:19), Max: 37.3 (08 Aug 2023 18:30)  T(F): 98.1 (09 Aug 2023 09:19), Max: 99.1 (08 Aug 2023 18:30)  HR: 53 (09 Aug 2023 09:19) (53 - 62)  BP: 125/77 (09 Aug 2023 09:19) (119/80 - 144/87)  BP(mean): --  RR: 18 (09 Aug 2023 09:19) (16 - 18)  SpO2: 100% (09 Aug 2023 09:19) (97% - 100%)    Parameters below as of 09 Aug 2023 09:19  Patient On (Oxygen Delivery Method): room air      I&O's Detail    08 Aug 2023 07:01  -  09 Aug 2023 07:00  --------------------------------------------------------  IN:    Oral Fluid: 660 mL  Total IN: 660 mL    OUT:  Total OUT: 0 mL    Total NET: 660 mL            Physical Exam:  General: WN/WD NAD  Respiratory: airway patent, respirations unlabored, no increased WoB  Neurology: A&Ox3, nonfocal, JOHNS x 4  Abdominal: soft mild ruq ttp      LABS:                        14.8   2.95  )-----------( 124      ( 08 Aug 2023 07:12 )             44.3     08-08    138  |  101  |  13  ----------------------------<  96  3.7   |  22  |  0.77    Ca    9.0      08 Aug 2023 07:12    TPro  7.5  /  Alb  4.2  /  TBili  0.5  /  DBili  x   /  AST  21  /  ALT  18  /  AlkPhos  93  08-08    PT/INR - ( 08 Aug 2023 07:12 )   PT: 13.6 sec;   INR: 1.24 ratio           Urinalysis Basic - ( 08 Aug 2023 07:56 )    Color: Light Yellow / Appearance: Clear / S.013 / pH: x  Gluc: x / Ketone: Small  / Bili: Negative / Urobili: Negative   Blood: x / Protein: Negative / Nitrite: Negative   Leuk Esterase: Negative / RBC: 0 /hpf / WBC 3 /HPF   Sq Epi: x / Non Sq Epi: x / Bacteria: Negative        RADIOLOGY & ADDITIONAL STUDIES:
Interval Events:   Patient with some intermittent RUQ pain overnight that is now resolved per my interview this afternoon.    ROS:   12 point review of systems performed and negative except otherwise noted in HPI.    Hospital Medications:  amLODIPine   Tablet 5 milliGRAM(s) Oral daily  benzocaine/menthol Lozenge 1 Lozenge Oral every 4 hours PRN  chlorhexidine 2% Cloths 1 Application(s) Topical daily  hydrochlorothiazide 12.5 milliGRAM(s) Oral daily  indomethacin Suppository 100 milliGRAM(s) Rectal once  ketorolac   Injectable 15 milliGRAM(s) IV Push every 8 hours PRN  losartan 100 milliGRAM(s) Oral daily  pantoprazole    Tablet 40 milliGRAM(s) Oral before breakfast  senna 2 Tablet(s) Oral at bedtime  sodium chloride 0.9%. 1000 milliLiter(s) IV Continuous <Continuous>      PHYSICAL EXAM:   Vital Signs:  Vital Signs Last 24 Hrs  T(C): 36.3 (09 Aug 2023 15:56), Max: 37.3 (08 Aug 2023 18:30)  T(F): 97.4 (09 Aug 2023 15:56), Max: 99.1 (08 Aug 2023 18:30)  HR: 56 (09 Aug 2023 15:56) (53 - 62)  BP: 138/78 (09 Aug 2023 15:56) (119/80 - 144/87)  BP(mean): --  RR: 18 (09 Aug 2023 15:56) (16 - 18)  SpO2: 96% (09 Aug 2023 15:56) (96% - 100%)    Parameters below as of 09 Aug 2023 15:56  Patient On (Oxygen Delivery Method): room air      Daily     Daily Weight in k.4 (09 Aug 2023 09:19)    GENERAL: no acute distress  NEURO: alert  HEENT: NCAT, no conjunctival pallor appreciated  CHEST: no respiratory distress, no accessory muscle use  CARDIAC: regular rate, +S1/S2  ABDOMEN: soft, nontender, no rebound or guarding  EXTREMITIES: warm, well perfused  SKIN: no lesions noted    LABS: reviewed                        14.8   2.95  )-----------( 124      ( 08 Aug 2023 07:12 )             44.3     08-08    138  |  101  |  13  ----------------------------<  96  3.7   |  22  |  0.77    Ca    9.0      08 Aug 2023 07:12    TPro  7.5  /  Alb  4.4  /  TBili  0.4  /  DBili  0.1  /  AST  22  /  ALT  21  /  AlkPhos  89  08-09    LIVER FUNCTIONS - ( 09 Aug 2023 14:59 )  Alb: 4.4 g/dL / Pro: 7.5 g/dL / ALK PHOS: 89 U/L / ALT: 21 U/L / AST: 22 U/L / GGT: x             Interval Diagnostic Studies: see sunrise for full report  
Interval Events:   No acute events overnight following endoscopy.  Patient without acute symptoms at this time.    ROS:   12 point review of systems performed and negative except otherwise noted in HPI.    Hospital Medications:  amLODIPine   Tablet 5 milliGRAM(s) Oral daily  benzocaine/menthol Lozenge 1 Lozenge Oral every 4 hours PRN  chlorhexidine 2% Cloths 1 Application(s) Topical daily  hydrochlorothiazide 12.5 milliGRAM(s) Oral daily  indomethacin Suppository 100 milliGRAM(s) Rectal once  ketorolac   Injectable 15 milliGRAM(s) IV Push every 8 hours PRN  lactulose Syrup 20 Gram(s) Oral once  losartan 100 milliGRAM(s) Oral daily  pantoprazole    Tablet 40 milliGRAM(s) Oral before breakfast  senna 2 Tablet(s) Oral at bedtime  sodium chloride 0.9%. 1000 milliLiter(s) IV Continuous <Continuous>      PHYSICAL EXAM:   Vital Signs:  Vital Signs Last 24 Hrs  T(C): 36.7 (09 Aug 2023 09:19), Max: 37.3 (08 Aug 2023 18:30)  T(F): 98.1 (09 Aug 2023 09:19), Max: 99.1 (08 Aug 2023 18:30)  HR: 53 (09 Aug 2023 09:19) (53 - 62)  BP: 125/77 (09 Aug 2023 09:19) (119/80 - 144/87)  BP(mean): --  RR: 18 (09 Aug 2023 09:19) (16 - 18)  SpO2: 100% (09 Aug 2023 09:19) (97% - 100%)    Parameters below as of 09 Aug 2023 09:19  Patient On (Oxygen Delivery Method): room air      Daily     Daily Weight in k.4 (09 Aug 2023 09:19)    GENERAL: no acute distress  NEURO: alert  HEENT: NCAT, no conjunctival pallor appreciated  CHEST: no respiratory distress, no accessory muscle use  CARDIAC: regular rate, +S1/S2  ABDOMEN: soft, nontender, no rebound or guarding  EXTREMITIES: warm, well perfused  SKIN: no lesions noted    LABS: reviewed                        14.8   2.95  )-----------( 124      ( 08 Aug 2023 07:12 )             44.3     08-08    138  |  101  |  13  ----------------------------<  96  3.7   |  22  |  0.77    Ca    9.0      08 Aug 2023 07:12    TPro  7.5  /  Alb  4.2  /  TBili  0.5  /  DBili  x   /  AST  21  /  ALT  18  /  AlkPhos  93  08-08    LIVER FUNCTIONS - ( 08 Aug 2023 07:12 )  Alb: 4.2 g/dL / Pro: 7.5 g/dL / ALK PHOS: 93 U/L / ALT: 18 U/L / AST: 21 U/L / GGT: x             Interval Diagnostic Studies: see sunrise for full report  
Surgery Progress Note     Subjective:  Patient seen and examined. Patient no longer wants surgery while in patient. States she would like to be discharged and do more research before scheduling.      Vital Signs:  Vital Signs Last 24 Hrs  T(C): 36.9 (10 Aug 2023 09:00), Max: 37.1 (09 Aug 2023 23:44)  T(F): 98.4 (10 Aug 2023 09:00), Max: 98.7 (09 Aug 2023 23:44)  HR: 60 (10 Aug 2023 09:00) (53 - 60)  BP: 125/82 (10 Aug 2023 09:00) (125/82 - 171/97)  BP(mean): --  RR: 18 (10 Aug 2023 09:00) (18 - 18)  SpO2: 98% (10 Aug 2023 09:00) (96% - 98%)    Parameters below as of 10 Aug 2023 09:00  Patient On (Oxygen Delivery Method): room air        CAPILLARY BLOOD GLUCOSE          I&O's Detail    09 Aug 2023 07:01  -  10 Aug 2023 07:00  --------------------------------------------------------  IN:    Oral Fluid: 240 mL  Total IN: 240 mL    OUT:  Total OUT: 0 mL    Total NET: 240 mL            Physical Exam:  General: NAD, resting comfortably in bed  HEENT: Normocephalic atraumatic  Respiratory: Nonlabored respirations  Cardio: regular rate  Abdomen: soft, nondistended, nontender  Vascular: extremities are warm and well perfused      Labs:    08-10    141  |  102  |  14  ----------------------------<  81  3.2<L>   |  27  |  0.83    Ca    9.1      10 Aug 2023 07:17    TPro  6.8  /  Alb  4.0  /  TBili  0.4  /  DBili  x   /  AST  20  /  ALT  19  /  AlkPhos  81  08-10    LIVER FUNCTIONS - ( 10 Aug 2023 07:17 )  Alb: 4.0 g/dL / Pro: 6.8 g/dL / ALK PHOS: 81 U/L / ALT: 19 U/L / AST: 20 U/L / GGT: x                                 12.9   3.68  )-----------( 109      ( 10 Aug 2023 07:17 )             38.7

## 2023-08-10 NOTE — PROGRESS NOTE ADULT - PROVIDER SPECIALTY LIST ADULT
Gastroenterology
Internal Medicine
Surgery
Gastroenterology
Internal Medicine
Surgery

## 2023-08-10 NOTE — CHART NOTE - NSCHARTNOTEFT_GEN_A_CORE
Hypokalemia 3.2 noted on AM labs. Patient seen and examined at bedside, reports no complaints. States she feels well, abdominal pain significantly improved, constipation resolved and she wishes to go home and discuss cholecystectomy outpatient and obtain second opinion. Denies fever, chills, CP, SOB, palpitations, n/v/d, constipation, abdominal pain, dizziness/lightheadedness or any other complaints at this time.     Vital Signs Last 24 Hrs  T(C): 36.9 (10 Aug 2023 09:00), Max: 37.1 (09 Aug 2023 23:44)  T(F): 98.4 (10 Aug 2023 09:00), Max: 98.7 (09 Aug 2023 23:44)  HR: 60 (10 Aug 2023 09:00) (53 - 60)  BP: 125/82 (10 Aug 2023 09:00) (125/82 - 171/97)  BP(mean): --  RR: 18 (10 Aug 2023 09:00) (18 - 18)  SpO2: 98% (10 Aug 2023 09:00) (96% - 98%)    Parameters below as of 10 Aug 2023 09:00  Patient On (Oxygen Delivery Method): room air Hypokalemia 3.2 noted on AM labs. Patient seen and examined at bedside, reports no complaints. States she feels well, abdominal pain significantly improved, constipation resolved and she wishes to go home and discuss cholecystectomy outpatient and obtain second opinion. Denies fever, chills, CP, SOB, palpitations, n/v/d, constipation, abdominal pain, dizziness/lightheadedness or any other complaints at this time.     Vital Signs Last 24 Hrs  T(C): 36.9 (10 Aug 2023 09:00), Max: 37.1 (09 Aug 2023 23:44)  T(F): 98.4 (10 Aug 2023 09:00), Max: 98.7 (09 Aug 2023 23:44)  HR: 60 (10 Aug 2023 09:00) (53 - 60)  BP: 125/82 (10 Aug 2023 09:00) (125/82 - 171/97)  BP(mean): --  RR: 18 (10 Aug 2023 09:00) (18 - 18)  SpO2: 98% (10 Aug 2023 09:00) (96% - 98%)    Parameters below as of 10 Aug 2023 09:00  Patient On (Oxygen Delivery Method): room air    A/P: 66 y/o F with PMHx of HCV cirrhosis (tx'ed incevac and ribavirin) c/b S7/8 HCC s/p Y90 (04/2022), gallstones presents to ED c/o right upper quadrant pain x 1 week a/w nausea. Found to have choledocholithiasis s/p ERCP on 8/7. Advanced GI/surgery following. Patient wishes to further discuss elective cholecystectomy outpatient. Patient medically cleared for discharge by attending, hypokalemia noted on AM labs. Discussed with Dr. Yates, plan as below    >   > PO 40 meq KCL x 1 ordered STAT  > Per attending, do not need repeat BMP s/p supplementation  > Cleared for discharge home s/p supplementation    Vicky Morrow PA-C Hypokalemia 3.2 noted on AM labs. Patient seen and examined at bedside, reports no complaints. States she feels well, abdominal pain significantly improved, constipation resolved and she wishes to go home and discuss cholecystectomy outpatient and obtain second opinion. Denies fever, chills, CP, SOB, palpitations, n/v/d, constipation, abdominal pain, dizziness/lightheadedness or any other complaints at this time.     Vital Signs Last 24 Hrs  T(C): 36.9 (10 Aug 2023 09:00), Max: 37.1 (09 Aug 2023 23:44)  T(F): 98.4 (10 Aug 2023 09:00), Max: 98.7 (09 Aug 2023 23:44)  HR: 60 (10 Aug 2023 09:00) (53 - 60)  BP: 125/82 (10 Aug 2023 09:00) (125/82 - 171/97)  BP(mean): --  RR: 18 (10 Aug 2023 09:00) (18 - 18)  SpO2: 98% (10 Aug 2023 09:00) (96% - 98%)    Parameters below as of 10 Aug 2023 09:00  Patient On (Oxygen Delivery Method): room air    A/P: 66 y/o F with PMHx of HCV cirrhosis (tx'ed incevac and ribavirin) c/b S7/8 HCC s/p Y90 (04/2022), gallstones presents to ED c/o right upper quadrant pain x 1 week a/w nausea. Found to have choledocholithiasis s/p ERCP on 8/7. Advanced GI/surgery following. Patient wishes to further discuss elective cholecystectomy outpatient. Patient medically cleared for discharge by attending, hypokalemia noted on AM labs. Discussed with Dr. Yates, plan as below    > Mg 1.9  > PO 40 meq KCL x 1 ordered STAT  > Per attending, do not need repeat BMP s/p supplementation  > Cleared for discharge home s/p supplementation, follow up outpatient PCP for repeat blood work and continued monitoring in 1-3 days     Vicky Morrow PA-C

## 2023-08-10 NOTE — PROGRESS NOTE ADULT - ASSESSMENT
68yo Female with PMHx HTN, HCV cirrhosis (tx'ed incevac and ribavirin), history of HCC s/p Y90 (04/2022), gallstones, PSHx hysterectomy in 1998 presented to ED c/o right upper quadrant pain x 1 week. Imaging w/ findings of cholelithiasis and choledocholithiasis. Now s/p ERCP 8/7 with stone fragment removal via sphincterotomy, plastic pancreatic duct stent placed. Surgery consulted for cholelithiasis. Patient is potentially amenable to lap jose cruz but would like to go home and do more research before scheduling.    RECOMMENDATIONS:  - Patient to follow up in office w/ Dr. Jay to discuss elective lap jose cruz  - Please include Dr. Jay's contact information in patient's discharge paperwork  - Dispo per primary team    [facility]  [phone]

## 2023-08-10 NOTE — DISCHARGE NOTE NURSING/CASE MANAGEMENT/SOCIAL WORK - NSDCFUADDAPPT_GEN_ALL_CORE_FT
APPTS ARE READY TO BE MADE: [X ] YES    Best Family or Patient Contact (if needed):    Additional Information about above appointments (if needed):    1: Surgery  2: Hepatology (Jessi Torres NP)  3: PCP  4: GI    Other comments or requests:

## 2023-08-10 NOTE — PROGRESS NOTE ADULT - ASSESSMENT
M E D I C A L   A T T E N D I N G    F O L L O W    U P   N O T E  (08-10-23 )                                     ------------------------------------------------------------------------------------------------    patient evaluated by me, case discussed with team, chart, medications, and physical exam reviewed, labs / tests  and vitals reviewed by me, as bellow.   Patient is stable for discharge today.  patient overall feeling better, no more pain.. ad a small BM...     patient again changed her mind and now does not want to have cholecystectomy at tis time and want to go home... discussed with Sx attending and in agreement.. will DC home   Patient to follow up with  PMD, GI / hepatology, Sx..   See discharge document for full note.  [Greater than 35 min spent for these services. ]                             ( note written / Date of service  08-10-23 )    ==================>> MEDICATIONS <<====================    amLODIPine   Tablet 5 milliGRAM(s) Oral daily  chlorhexidine 2% Cloths 1 Application(s) Topical daily  hydrochlorothiazide 12.5 milliGRAM(s) Oral daily  indomethacin Suppository 100 milliGRAM(s) Rectal once  losartan 100 milliGRAM(s) Oral daily  pantoprazole    Tablet 40 milliGRAM(s) Oral before breakfast    MEDICATIONS  (PRN):  benzocaine/menthol Lozenge 1 Lozenge Oral every 4 hours PRN Sore Throat  ketorolac   Injectable 15 milliGRAM(s) IV Push every 8 hours PRN Moderate Pain (4 - 6)    ___________  Active diet:  Diet, DASH/TLC:   Sodium & Cholesterol Restricted  ___________________    ==================>> VITAL SIGNS <<==================    T(C): 36.9 (08-10-23 @ 09:00), Max: 37.1 (08-09-23 @ 23:44)  HR: 60 (08-10-23 @ 09:00) (53 - 60)  BP: 125/82 (08-10-23 @ 09:00) (125/82 - 171/97)  BP(mean): --  RR: 18 (08-10-23 @ 09:00) (18 - 18)  SpO2: 98% (08-10-23 @ 09:00) (96% - 98%)       I&O's Summary    09 Aug 2023 07:01  -  10 Aug 2023 07:00  --------------------------------------------------------  IN: 240 mL / OUT: 0 mL / NET: 240 mL       ==================>> LAB AND IMAGING <<==================                        12.9   3.68  )-----------( 109      ( 10 Aug 2023 07:17 )             38.7        08-10    141  |  102  |  14  ----------------------------<  81  3.2<L>   |  27  |  0.83    Ca    9.1      10 Aug 2023 07:17    TPro  6.8  /  Alb  4.0  /  TBili  0.4  /  DBili  x   /  AST  20  /  ALT  19  /  AlkPhos  81  08-10                Urinalysis Basic - ( 10 Aug 2023 07:17 )    Color: x / Appearance: x / SG: x / pH: x  Gluc: 81 mg/dL / Ketone: x  / Bili: x / Urobili: x   Blood: x / Protein: x / Nitrite: x   Leuk Esterase: x / RBC: x / WBC x   Sq Epi: x / Non Sq Epi: x / Bacteria: x    WBC count:   3.68 <<== ,  2.95 <<== ,  2.67 <<==   Hemoglobin:   12.9 <<==,  14.8 <<==,  13.6 <<==  platelets:  109 <==, 124 <==, 121 <==    Creatinine:  0.83  <<==, 0.77  <<==, 0.74  <<==, 0.78  <<==  Sodium:   141  <==, 138  <==, 141  <==, 139  <==       AST:          20 <== , 22 <== , 21 <== , 21 <== , 20 <==      ALT:        19  <== , 21  <== , 18  <== , 19  <== , 23  <==      AP:        81  <=, 89  <=, 93  <=, 98  <=, 104  <=     Bili:        0.4  <=, 0.4  <=, 0.5  <=, 0.5  <=, 0.5  <=

## 2023-08-16 ENCOUNTER — APPOINTMENT (OUTPATIENT)
Dept: HEPATOLOGY | Facility: CLINIC | Age: 68
End: 2023-08-16
Payer: MEDICARE

## 2023-08-16 VITALS
TEMPERATURE: 97.1 F | BODY MASS INDEX: 25.91 KG/M2 | HEIGHT: 68 IN | WEIGHT: 171 LBS | OXYGEN SATURATION: 97 % | DIASTOLIC BLOOD PRESSURE: 70 MMHG | HEART RATE: 69 BPM | SYSTOLIC BLOOD PRESSURE: 120 MMHG

## 2023-08-16 PROCEDURE — 99214 OFFICE O/P EST MOD 30 MIN: CPT

## 2023-08-16 NOTE — PHYSICAL EXAM
[General Appearance - Alert] : alert [General Appearance - In No Acute Distress] : in no acute distress [Sclera] : the sclera and conjunctiva were normal [] : no respiratory distress [Respiration, Rhythm And Depth] : normal respiratory rhythm and effort [Edema] : there was no peripheral edema [Abdomen Soft] : soft [Abdomen Tenderness] : non-tender [Abdomen Mass (___ Cm)] : no abdominal mass palpated [Oriented To Time, Place, And Person] : oriented to person, place, and time [Affect] : the affect was normal [Scleral Icterus] : No Scleral Icterus [Spider Angioma] : No spider angioma(s) were observed [Abdominal  Ascites] : no ascites [Asterixis] : no asterixis observed [Jaundice] : No jaundice [Palmar Erythema] : no Palmar Erythema [Abdomen Hernia] : no hernia was discovered

## 2023-08-16 NOTE — REVIEW OF SYSTEMS
[As Noted in HPI] : as noted in HPI [Abdominal Pain] : abdominal pain [Negative] : Heme/Lymph [Fever] : no fever [Chills] : no chills [Melena] : no melena [Itching] : no itching [Confused] : no confusion [FreeTextEntry9] : patient has low-back pain.

## 2023-08-16 NOTE — ASSESSMENT
[FreeTextEntry1] : 66 y/o female with pmhx HTN, fatty liver and cirrhosis secondary to hepatitis C who developed hepatocellular carcinoma in Jan 2022 s/p radioembolization here for recent s/p hospitalization follow up. She was hospitalized at [facility] from 8/6-8/10 for RUQ pain radiating to her back. Imaging showed cholelithiasis and minimal debris and/or tiny distal common bile duct stones. Underwent ERCP on 8/7, pancreatic duct stent placed during ERCP to reduce risk of pancreatitis. She was recommended elective cholecystectomy however she is hesitant. I recommended follow up with GI for this. If she will move forward with elective cholecystectomy, one of our liver surgeons can see her.   #Compensated Cirrhosis secondary to hepatitis C.  -Completed treatment for hepatitis C with Pegasys, ribavirin plus Sovaldi on 6/13/14. She is a sustained virological responder.  -No signs of decompensation. BW on 8/10 ALT 19, AST 20, ALP 81, TB 0.4, lipase 46 -Esophageal / gastric varices: EGD from  8/10/23 revealed no varices.   #Hepatocellular carcinoma -MRI from 1/10/22 demonstrates a cirrhotic liver with a new 1.6 cm right hepatic lobe hepatocellular carcinoma (LR-5 Definitely HCC). Alpha-fetoprotein was 35. s/p Y90 4/21/22 to segment 7/8 with Dr Olmedo.  MRI from 6/28/23 demonstrates cirrhosis and post radioembolization changes in the right hepatic lobe. No evidence of recurrent disease (LR-TR nonviable). No new hepatic lesions. AFP on 6/14/23 was 5.6.  -Chest CT 3/17/22 showed no evidence of intrathoracic metastasis, NM bone imaging 3/17/22 showed no scan evidence of osseous metastasis.   #NAFLD Encouraged maintaining a diet low in carbohydrates, fat and cholesterol, healthy fats (avocados and almonds), lean meats and whole grains.   Plan: See GI, F/U in 2 months.

## 2023-08-16 NOTE — HISTORY OF PRESENT ILLNESS
[Hospitalization] : ~he/she~ was hospitalized [de-identified] : 8/6-8/10 RUQ pain [de-identified] : 66 y/o female with pmhx HTN, fatty liver and cirrhosis secondary to hepatitis C who developed hepatocellular carcinoma in Jan 2022 s/p radioembolization here for recent s/p hospitalization follow up. She was hospitalized at [facility] from 8/6-8/10 for RUQ pain radiating to her back. CTAP noted cholelithiasis without cholecystitis, also noted possible choledocholithiasis with tiny 2 mm stone in the distal CBD. MRCP (8/6)- Minimal debris and/or tiny distal common bile duct stones. Underwent ERCP on 8/7, pancreatic duct stent placed during ERCP to reduce risk of pancreatitis. Was recommended elective cholecystectomy however pt wants to consider as outpatient. She is hesitant about surgery and who will perform it. Since discharge, still has pain but mild and decreasing.    6/14/23 Still has intermittent chronic right mid abdominal discomfort, no changes. She otherwise feels well.  No fluid overload. No LLE. Mental status is clear. She is scheduled for repeat abdo MRI on 6/28. No recent BW. Fibroscan test on 6/14/23 showed F4 (44.6 kPa), S1 ().   3/14/23 Feels well but has been experiencing chronic intermittent right mid abdominal discomfort, which radiates to her back. Denies nausea, vomiting, melena, hematochezia, or hematemesis. Has hx of gallstones, was previously advised to have cholecystectomy, but deferred.   11/15/22 Has neck pain receiving PT/acupuncture for few months with improvement. Had pain in her right abdomen in the past but not in the last few months. States that she feels well. Has not been eating as much as she used to few yrs ago and losing weight slowly, now 170 lbs, BMI 26. Reports had stool testing for colon cancer screening last month with PCP and was neg.  Abdo MRI 9/28/22 showed stable post radioembolization changes without evidence of viable tumor. No new lesions.  AFP 7.4 on 9/29/22.   7/20/22: She currently feels well and has been gardening since she is avoiding crowded places. No abdominal pain. Has right shoulder pain which improved after taking Gabapentin.   She completed treatment for hepatitis C with Pegasys, ribavirin plus Sovaldi on 6/13/14.  She was found to have a lesion on ultrasound with an elevated alpha-fetoprotein and an MRI January 10, 2022 showed a 1.6 cm LIRADS 5 lesion in segment 5. She was seen by Dr. Franklin Olmedo (IR) and had radioembolization of liver tumor on 4/21/22.   Upper endoscopy from 10/5/2015 revealed no varices. Colonoscopy from 10/5/2015 which revealed internal hemorrhoids. She will need a repeat colonoscopy in 2025  FibroScan December 20, 2021 F4, S0 FibroScan June 16, 2020 F3, S3 Fibroscan December 14, 2017 with F3 (11.9 kPa), S1 disease A fibroscan performed on December 14, 2016 showed F3, S3  disease. (11.8 kPa) A fibroscan performed on October 13, 2015 showed F4 disease. A liver biopsy from July 25, 2006 reveals stage III disease with steatosis.  MRI 6/28/22 no evidence of residual tumor. No new lesions.   MRI from 1/10/22 demonstrates a cirrhotic liver with a new 1.6 cm right hepatic lobe hepatocellular carcinoma (LR-5 Definitely HCC). Alpha-fetoprotein was 35  An abdominal sonogram December 13, 2021 with a 1.6 x 1.4 cm lesion in the right lobe of the liver.  Blood test December 14, 2021 INR 1.17, alpha-fetoprotein 35.7, ALT 25, creatinine 0.9  Blood tests April 5, 2021 platelet count 160,000, ALT 25, AST 25, total bilirubin 0.4, alkaline phosphatase 93, creatinine 0.96, INR 1.12, alpha-fetoprotein 11.2, abdominal sonogram April 5, 2021 with no lesions in the liver  Blood test June 23, 2020 platelet count 148,000, alpha-fetoprotein 6.8, ALT 21, AST 21, creatinine 0.92, total bilirubin 0.6, INR 1.16, abdominal sonogram May 14, 2020 with a cirrhotic liver without lesions  Blood tests  November 11, 2019 platelet count 150,000, alpha-fetoprotein 7.5, creatinine 0.93, ALT 25, AST 24, total bilirubin 0.5, INR 1.13. Abdominal sonogram November 7, 2019 without lesions in the liver  Abdominal sonogram May 15, 2019 shows a cirrhotic liver without lesions. Blood tests May 15, 2019 alpha-fetoprotein 7.5, INR 1.1, creatinine 0.83, ALT 28, alkaline phosphatase 82, platelet count [id number]  Blood tests November 3, 2018 platelet count 169,000, ALT 25, AST 24, alkaline phosphatase 101, creatinine 0.86, alpha-fetoprotein 7.3, INR 1.1, abdominal sonogram November 13, 2018 with no lesions in the liver  Blood tests May 2018 platelet count 145,000, INR 1.11, total bilirubin 0.5, ALT 22, alkaline phosphatase 100, creatinine 0.93. An abdominal sonogram May 1, 2008 shows no lesions in the liver  Blood test December 12, 2017 ALT 23, AST 26, alkaline phosphatase one of 2, creatinine 0.93  Blood tests October 19, 2017 INR 1.1, platelet count 162,000, ALT 62, AST 54, total bilirubin 0.7, alkaline phosphatase 114, alpha-fetoprotein 7.6.  An abdominal sonogram October 19, 2017 with no lesions in the liver. She does have gallstones  Blood test March 29, 2017 creatinine 0.3, ALT 28, INR 1.11, alpha-fetoprotein 8. Abdominal sonogram October 31, 2016 shows no lesions in the liver  Blood tests September 27, 2016 INR 1.09, ALT 30, alcohol and prostatism 3, creatinine 0.86, alpha-fetoprotein 8.6, HCV RNA not detected, platelet count 145,000  Sonogram March 25, 2016 with gallstones. There were no lesions in the liver.  Blood test for 2016 INR 1.14, platelet count 150,000, creatinine 0.95, ALT 24, alkaline phosphatase 116, alpha-fetoprotein 9.5  Sonogram September 29 2015 gallstones without lesions in the liver   Abdominal sonogram from February 19, 2015 shows no hepatic lesions, she has multiple small gallstones.

## 2023-09-07 ENCOUNTER — APPOINTMENT (OUTPATIENT)
Dept: TRANSPLANT | Facility: CLINIC | Age: 68
End: 2023-09-07
Payer: MEDICARE

## 2023-09-07 VITALS
HEIGHT: 68 IN | DIASTOLIC BLOOD PRESSURE: 81 MMHG | OXYGEN SATURATION: 97 % | TEMPERATURE: 97.3 F | BODY MASS INDEX: 25.76 KG/M2 | WEIGHT: 170 LBS | SYSTOLIC BLOOD PRESSURE: 128 MMHG | HEART RATE: 62 BPM

## 2023-09-07 LAB
ALBUMIN SERPL ELPH-MCNC: 4.7 G/DL
ALP BLD-CCNC: 114 U/L
ALT SERPL-CCNC: 20 U/L
ANION GAP SERPL CALC-SCNC: 13 MMOL/L
AST SERPL-CCNC: 22 U/L
BILIRUB SERPL-MCNC: 0.4 MG/DL
BUN SERPL-MCNC: 21 MG/DL
CALCIUM SERPL-MCNC: 9.5 MG/DL
CHLORIDE SERPL-SCNC: 105 MMOL/L
CO2 SERPL-SCNC: 26 MMOL/L
CREAT SERPL-MCNC: 0.76 MG/DL
EGFR: 85 ML/MIN/1.73M2
GLUCOSE SERPL-MCNC: 77 MG/DL
HCT VFR BLD CALC: 40 %
HGB BLD-MCNC: 13.4 G/DL
INR PPP: 1.11 RATIO
MCHC RBC-ENTMCNC: 32.4 PG
MCHC RBC-ENTMCNC: 33.5 GM/DL
MCV RBC AUTO: 96.6 FL
PLATELET # BLD AUTO: 118 K/UL
POTASSIUM SERPL-SCNC: 4 MMOL/L
PROT SERPL-MCNC: 7.8 G/DL
PT BLD: 12.5 SEC
RBC # BLD: 4.14 M/UL
RBC # FLD: 13.3 %
SODIUM SERPL-SCNC: 143 MMOL/L
WBC # FLD AUTO: 3.61 K/UL

## 2023-09-07 PROCEDURE — 99205 OFFICE O/P NEW HI 60 MIN: CPT

## 2023-09-08 NOTE — PHYSICAL EXAM
[Normal Breath Sounds] : Normal breath sounds [No HSM] : no hepatosplenomegaly [No Rash or Lesion] : No rash or lesion [Alert] : alert [Oriented to Person] : oriented to person [Oriented to Place] : oriented to place [Oriented to Time] : oriented to time [Calm] : calm [de-identified] : NAD [de-identified] : NC/AT

## 2023-09-08 NOTE — ASSESSMENT
[FreeTextEntry1] : 68yo F with HCV cirrhosis and treated HCV.   Long h/o biliary colic and choledocholithiasis sp ERCP.    We discussed options for mgmnt including surgery and observation.   We discussed the risks of both of these approaches.  We discussed to possibility of open surgery, bleeding, infection, damage to adjacent structures.  All questions answered.  Contact information provided.  She seems inclined to proceed with surgery.

## 2023-09-08 NOTE — REVIEW OF SYSTEMS
[Fever] : no fever [Chills] : no chills [Feeling Poorly] : not feeling poorly [Feeling Tired] : not feeling tired [Eye Pain] : no eye pain [Earache] : no earache [Loss Of Hearing] : no hearing loss [Shortness Of Breath] : no shortness of breath [Wheezing] : no wheezing [Abdominal Pain] : no abdominal pain [Vomiting] : no vomiting [Dysuria] : no dysuria [Pelvic Pain] : no pelvic pain [Joint Swelling] : no joint swelling [Joint Stiffness] : no joint stiffness [Limb Pain] : no limb pain [Limb Swelling] : no limb swelling [Skin Lesions] : no skin lesions [Skin Wound] : no skin wound [Itching] : no itching [Confused] : no confusion [Convulsions] : no convulsions [Dizziness] : no dizziness [Fainting] : no fainting [Suicidal] : not suicidal [Sleep Disturbances] : no sleep disturbances [Depression] : no depression

## 2023-09-08 NOTE — HISTORY OF PRESENT ILLNESS
[de-identified] : 66yo F h/o HCV cirrhosis and HCC sp radioembolization (1/22) who presents w h/o choledocholithiasis and biliary colic.   Had ERCP 8/7 to evacuate distal DBC stones, stent placed.   RUQ pain is intermittent, radiates to back.  Has had multiple imaging studies demonstrating cholelithiasis.  No h/o portal HTN sx.   INR 1.1, , Tb 0.4,  Na 143.

## 2023-09-11 ENCOUNTER — APPOINTMENT (OUTPATIENT)
Dept: RADIOLOGY | Facility: CLINIC | Age: 68
End: 2023-09-11
Payer: MEDICARE

## 2023-09-11 ENCOUNTER — OUTPATIENT (OUTPATIENT)
Dept: OUTPATIENT SERVICES | Facility: HOSPITAL | Age: 68
LOS: 1 days | End: 2023-09-11
Payer: MEDICARE

## 2023-09-11 DIAGNOSIS — Z00.8 ENCOUNTER FOR OTHER GENERAL EXAMINATION: ICD-10-CM

## 2023-09-11 DIAGNOSIS — Z96.89 PRESENCE OF OTHER SPECIFIED FUNCTIONAL IMPLANTS: ICD-10-CM

## 2023-09-11 PROCEDURE — 74018 RADEX ABDOMEN 1 VIEW: CPT

## 2023-09-11 PROCEDURE — 74018 RADEX ABDOMEN 1 VIEW: CPT | Mod: 26

## 2023-09-14 ENCOUNTER — APPOINTMENT (OUTPATIENT)
Dept: GASTROENTEROLOGY | Facility: CLINIC | Age: 68
End: 2023-09-14

## 2023-09-28 ENCOUNTER — APPOINTMENT (OUTPATIENT)
Dept: GASTROENTEROLOGY | Facility: CLINIC | Age: 68
End: 2023-09-28

## 2023-10-02 ENCOUNTER — APPOINTMENT (OUTPATIENT)
Dept: HEPATOLOGY | Facility: HOSPITAL | Age: 68
End: 2023-10-02

## 2023-10-04 ENCOUNTER — OUTPATIENT (OUTPATIENT)
Dept: OUTPATIENT SERVICES | Facility: HOSPITAL | Age: 68
LOS: 1 days | End: 2023-10-04
Payer: MEDICARE

## 2023-10-04 VITALS
WEIGHT: 169.98 LBS | DIASTOLIC BLOOD PRESSURE: 83 MMHG | OXYGEN SATURATION: 97 % | TEMPERATURE: 99 F | RESPIRATION RATE: 18 BRPM | HEIGHT: 68 IN | HEART RATE: 63 BPM | SYSTOLIC BLOOD PRESSURE: 124 MMHG

## 2023-10-04 DIAGNOSIS — Z01.818 ENCOUNTER FOR OTHER PREPROCEDURAL EXAMINATION: ICD-10-CM

## 2023-10-04 DIAGNOSIS — K81.9 CHOLECYSTITIS, UNSPECIFIED: ICD-10-CM

## 2023-10-04 DIAGNOSIS — Z98.890 OTHER SPECIFIED POSTPROCEDURAL STATES: Chronic | ICD-10-CM

## 2023-10-04 LAB
ALBUMIN SERPL ELPH-MCNC: 4.4 G/DL — SIGNIFICANT CHANGE UP (ref 3.3–5)
ALP SERPL-CCNC: 102 U/L — SIGNIFICANT CHANGE UP (ref 40–120)
ALT FLD-CCNC: 24 U/L — SIGNIFICANT CHANGE UP (ref 10–45)
ANION GAP SERPL CALC-SCNC: 10 MMOL/L — SIGNIFICANT CHANGE UP (ref 5–17)
AST SERPL-CCNC: 23 U/L — SIGNIFICANT CHANGE UP (ref 10–40)
BILIRUB SERPL-MCNC: 0.2 MG/DL — SIGNIFICANT CHANGE UP (ref 0.2–1.2)
BUN SERPL-MCNC: 25 MG/DL — HIGH (ref 7–23)
CALCIUM SERPL-MCNC: 10 MG/DL — SIGNIFICANT CHANGE UP (ref 8.4–10.5)
CHLORIDE SERPL-SCNC: 104 MMOL/L — SIGNIFICANT CHANGE UP (ref 96–108)
CO2 SERPL-SCNC: 28 MMOL/L — SIGNIFICANT CHANGE UP (ref 22–31)
CREAT SERPL-MCNC: 1.03 MG/DL — SIGNIFICANT CHANGE UP (ref 0.5–1.3)
EGFR: 59 ML/MIN/1.73M2 — LOW
GLUCOSE SERPL-MCNC: 96 MG/DL — SIGNIFICANT CHANGE UP (ref 70–99)
HCT VFR BLD CALC: 38 % — SIGNIFICANT CHANGE UP (ref 34.5–45)
HGB BLD-MCNC: 12.6 G/DL — SIGNIFICANT CHANGE UP (ref 11.5–15.5)
MCHC RBC-ENTMCNC: 31.7 PG — SIGNIFICANT CHANGE UP (ref 27–34)
MCHC RBC-ENTMCNC: 33.2 GM/DL — SIGNIFICANT CHANGE UP (ref 32–36)
MCV RBC AUTO: 95.7 FL — SIGNIFICANT CHANGE UP (ref 80–100)
NRBC # BLD: 0 /100 WBCS — SIGNIFICANT CHANGE UP (ref 0–0)
PLATELET # BLD AUTO: 120 K/UL — LOW (ref 150–400)
POTASSIUM SERPL-MCNC: 4.2 MMOL/L — SIGNIFICANT CHANGE UP (ref 3.5–5.3)
POTASSIUM SERPL-SCNC: 4.2 MMOL/L — SIGNIFICANT CHANGE UP (ref 3.5–5.3)
PROT SERPL-MCNC: 7.4 G/DL — SIGNIFICANT CHANGE UP (ref 6–8.3)
RBC # BLD: 3.97 M/UL — SIGNIFICANT CHANGE UP (ref 3.8–5.2)
RBC # FLD: 13 % — SIGNIFICANT CHANGE UP (ref 10.3–14.5)
SODIUM SERPL-SCNC: 142 MMOL/L — SIGNIFICANT CHANGE UP (ref 135–145)
WBC # BLD: 3.87 K/UL — SIGNIFICANT CHANGE UP (ref 3.8–10.5)
WBC # FLD AUTO: 3.87 K/UL — SIGNIFICANT CHANGE UP (ref 3.8–10.5)

## 2023-10-04 PROCEDURE — G0463: CPT

## 2023-10-04 PROCEDURE — 85027 COMPLETE CBC AUTOMATED: CPT

## 2023-10-04 PROCEDURE — 80053 COMPREHEN METABOLIC PANEL: CPT

## 2023-10-04 RX ORDER — ASCORBIC ACID 60 MG
1 TABLET,CHEWABLE ORAL
Qty: 0 | Refills: 0 | DISCHARGE

## 2023-10-04 RX ORDER — SELENIOUS ACID 40 UG/ML
1 INJECTION, SOLUTION INTRAVENOUS
Qty: 0 | Refills: 0 | DISCHARGE

## 2023-10-04 RX ORDER — MULTIVIT-MIN/FERROUS GLUCONATE 9 MG/15 ML
1 LIQUID (ML) ORAL
Qty: 0 | Refills: 0 | DISCHARGE

## 2023-10-04 NOTE — H&P PST ADULT - NSICDXPASTSURGICALHX_GEN_ALL_CORE_FT
PAST SURGICAL HISTORY:  H/O: Hysterectomy h/o Fibroids, 1998    History of colonoscopy     History of ERCP

## 2023-10-04 NOTE — H&P PST ADULT - NSICDXPASTMEDICALHX_GEN_ALL_CORE_FT
PAST MEDICAL HISTORY:  Cervical arthritis     Cholecystitis     Cholelithiases     Chronic Pancreatitis     Cirrhosis     Corneal erosion, left     GERD (gastroesophageal reflux disease)     Hepatitis C attempted treatment in past and again in 2012 with incevac and ribavirin - succesful treatement    Hypertension     Peptic ulcer disease in past    Torn ACL right knee

## 2023-10-04 NOTE — H&P PST ADULT - HISTORY OF PRESENT ILLNESS
67 year old female with PMHx of HTN, PUD, HCV cirrhosis and Hepatocellular carcinoma s/p radioembolization (1/22), corneal erosion presents to PST for laparoscopic cholecystectomy with intraoperative cholangiography with Dr. Rivera on 10/11/23. Patient denies recent fever, chills, SOB, chest pain. 67 year old female with PMHx of HTN, peptic ulcer disease, HCV cirrhosis and Hepatocellular carcinoma (s/p radioembolization 1/22 - followed by hepatology - Jessi Torres) and a history of left corneal erosion presents to PST for laparoscopic cholecystectomy with intraoperative cholangiography with Dr. Rivera on 10/11/23. Patient denies recent fever, chills, SOB, chest pain. 67 year old female with PMHx of HTN, peptic ulcer disease, HCV cirrhosis and Hepatocellular carcinoma (s/p radioembolization 1/2022 - followed by hepatology - Jessi Torres) and a history of left corneal erosion presents to PST for laparoscopic cholecystectomy with intraoperative cholangiography with Dr. Rivera on 10/11/23. Patient denies recent fever, chills, SOB, chest pain.

## 2023-10-04 NOTE — H&P PST ADULT - ATTENDING COMMENTS
Pt w h/o acute cholecystitis, MRCP w small stones in CBD.  Presents for lap poss open cholecystectomy with IOC.  Risks of surgery (bleeding, infection, damage to adjacent structures, bile leak, hepatic decompensation), discussed.  All questions answered.  Consent signed and witnessed. Pt w h/o biliary colic and choledocholithiasis sp ERCP stent placement.   Presents for lap poss open cholecystectomy with IOC.  Risks of surgery (bleeding, infection, damage to adjacent structures, bile leak, hepatic decompensation), discussed.  All questions answered.  Consent signed and witnessed.

## 2023-10-10 ENCOUNTER — TRANSCRIPTION ENCOUNTER (OUTPATIENT)
Age: 68
End: 2023-10-10

## 2023-10-11 ENCOUNTER — RESULT REVIEW (OUTPATIENT)
Age: 68
End: 2023-10-11

## 2023-10-11 ENCOUNTER — TRANSCRIPTION ENCOUNTER (OUTPATIENT)
Age: 68
End: 2023-10-11

## 2023-10-11 ENCOUNTER — APPOINTMENT (OUTPATIENT)
Dept: TRANSPLANT | Facility: HOSPITAL | Age: 68
End: 2023-10-11

## 2023-10-11 ENCOUNTER — OUTPATIENT (OUTPATIENT)
Dept: OUTPATIENT SERVICES | Facility: HOSPITAL | Age: 68
LOS: 1 days | End: 2023-10-11
Payer: MEDICARE

## 2023-10-11 VITALS
SYSTOLIC BLOOD PRESSURE: 134 MMHG | RESPIRATION RATE: 16 BRPM | HEART RATE: 57 BPM | OXYGEN SATURATION: 98 % | DIASTOLIC BLOOD PRESSURE: 86 MMHG

## 2023-10-11 VITALS
HEIGHT: 68 IN | TEMPERATURE: 98 F | OXYGEN SATURATION: 98 % | DIASTOLIC BLOOD PRESSURE: 87 MMHG | HEART RATE: 64 BPM | RESPIRATION RATE: 18 BRPM | WEIGHT: 169.98 LBS | SYSTOLIC BLOOD PRESSURE: 138 MMHG

## 2023-10-11 DIAGNOSIS — Z98.890 OTHER SPECIFIED POSTPROCEDURAL STATES: Chronic | ICD-10-CM

## 2023-10-11 DIAGNOSIS — K81.9 CHOLECYSTITIS, UNSPECIFIED: ICD-10-CM

## 2023-10-11 LAB
ALBUMIN SERPL ELPH-MCNC: 4.1 G/DL — SIGNIFICANT CHANGE UP (ref 3.3–5)
ALP SERPL-CCNC: 87 U/L — SIGNIFICANT CHANGE UP (ref 40–120)
ALT FLD-CCNC: 37 U/L — SIGNIFICANT CHANGE UP (ref 10–45)
ANION GAP SERPL CALC-SCNC: 16 MMOL/L — SIGNIFICANT CHANGE UP (ref 5–17)
APTT BLD: 29.3 SEC — SIGNIFICANT CHANGE UP (ref 24.5–35.6)
AST SERPL-CCNC: 43 U/L — HIGH (ref 10–40)
BASOPHILS # BLD AUTO: 0.01 K/UL — SIGNIFICANT CHANGE UP (ref 0–0.2)
BASOPHILS NFR BLD AUTO: 0.3 % — SIGNIFICANT CHANGE UP (ref 0–2)
BILIRUB SERPL-MCNC: 0.4 MG/DL — SIGNIFICANT CHANGE UP (ref 0.2–1.2)
BUN SERPL-MCNC: 16 MG/DL — SIGNIFICANT CHANGE UP (ref 7–23)
CALCIUM SERPL-MCNC: 8.7 MG/DL — SIGNIFICANT CHANGE UP (ref 8.4–10.5)
CHLORIDE SERPL-SCNC: 99 MMOL/L — SIGNIFICANT CHANGE UP (ref 96–108)
CO2 SERPL-SCNC: 24 MMOL/L — SIGNIFICANT CHANGE UP (ref 22–31)
CREAT SERPL-MCNC: 0.71 MG/DL — SIGNIFICANT CHANGE UP (ref 0.5–1.3)
EGFR: 92 ML/MIN/1.73M2 — SIGNIFICANT CHANGE UP
EOSINOPHIL # BLD AUTO: 0.03 K/UL — SIGNIFICANT CHANGE UP (ref 0–0.5)
EOSINOPHIL NFR BLD AUTO: 0.8 % — SIGNIFICANT CHANGE UP (ref 0–6)
GLUCOSE SERPL-MCNC: 127 MG/DL — HIGH (ref 70–99)
HCT VFR BLD CALC: 37.4 % — SIGNIFICANT CHANGE UP (ref 34.5–45)
HGB BLD-MCNC: 12.3 G/DL — SIGNIFICANT CHANGE UP (ref 11.5–15.5)
IMM GRANULOCYTES NFR BLD AUTO: 0.3 % — SIGNIFICANT CHANGE UP (ref 0–0.9)
INR BLD: 1.21 RATIO — HIGH (ref 0.85–1.18)
LYMPHOCYTES # BLD AUTO: 0.69 K/UL — LOW (ref 1–3.3)
LYMPHOCYTES # BLD AUTO: 18.7 % — SIGNIFICANT CHANGE UP (ref 13–44)
MCHC RBC-ENTMCNC: 30.9 PG — SIGNIFICANT CHANGE UP (ref 27–34)
MCHC RBC-ENTMCNC: 32.9 GM/DL — SIGNIFICANT CHANGE UP (ref 32–36)
MCV RBC AUTO: 94 FL — SIGNIFICANT CHANGE UP (ref 80–100)
MONOCYTES # BLD AUTO: 0.1 K/UL — SIGNIFICANT CHANGE UP (ref 0–0.9)
MONOCYTES NFR BLD AUTO: 2.7 % — SIGNIFICANT CHANGE UP (ref 2–14)
NEUTROPHILS # BLD AUTO: 2.85 K/UL — SIGNIFICANT CHANGE UP (ref 1.8–7.4)
NEUTROPHILS NFR BLD AUTO: 77.2 % — HIGH (ref 43–77)
NRBC # BLD: 0 /100 WBCS — SIGNIFICANT CHANGE UP (ref 0–0)
PLATELET # BLD AUTO: 110 K/UL — LOW (ref 150–400)
POTASSIUM SERPL-MCNC: 3 MMOL/L — LOW (ref 3.5–5.3)
POTASSIUM SERPL-SCNC: 3 MMOL/L — LOW (ref 3.5–5.3)
PROT SERPL-MCNC: 7.3 G/DL — SIGNIFICANT CHANGE UP (ref 6–8.3)
PROTHROM AB SERPL-ACNC: 13.2 SEC — HIGH (ref 9.5–13)
RBC # BLD: 3.98 M/UL — SIGNIFICANT CHANGE UP (ref 3.8–5.2)
RBC # FLD: 12.9 % — SIGNIFICANT CHANGE UP (ref 10.3–14.5)
SODIUM SERPL-SCNC: 139 MMOL/L — SIGNIFICANT CHANGE UP (ref 135–145)
WBC # BLD: 3.69 K/UL — LOW (ref 3.8–10.5)
WBC # FLD AUTO: 3.69 K/UL — LOW (ref 3.8–10.5)

## 2023-10-11 PROCEDURE — C1758: CPT

## 2023-10-11 PROCEDURE — 47563 LAPARO CHOLECYSTECTOMY/GRAPH: CPT

## 2023-10-11 PROCEDURE — 36415 COLL VENOUS BLD VENIPUNCTURE: CPT

## 2023-10-11 PROCEDURE — C9399: CPT

## 2023-10-11 PROCEDURE — 85025 COMPLETE CBC W/AUTO DIFF WBC: CPT

## 2023-10-11 PROCEDURE — 86901 BLOOD TYPING SEROLOGIC RH(D): CPT

## 2023-10-11 PROCEDURE — C1889: CPT

## 2023-10-11 PROCEDURE — 80053 COMPREHEN METABOLIC PANEL: CPT

## 2023-10-11 PROCEDURE — 88304 TISSUE EXAM BY PATHOLOGIST: CPT | Mod: 26

## 2023-10-11 PROCEDURE — 86900 BLOOD TYPING SEROLOGIC ABO: CPT

## 2023-10-11 PROCEDURE — 74300 X-RAY BILE DUCTS/PANCREAS: CPT

## 2023-10-11 PROCEDURE — 85730 THROMBOPLASTIN TIME PARTIAL: CPT

## 2023-10-11 PROCEDURE — 86850 RBC ANTIBODY SCREEN: CPT

## 2023-10-11 PROCEDURE — 85610 PROTHROMBIN TIME: CPT

## 2023-10-11 PROCEDURE — 88304 TISSUE EXAM BY PATHOLOGIST: CPT

## 2023-10-11 PROCEDURE — 47563 LAPARO CHOLECYSTECTOMY/GRAPH: CPT | Mod: GC

## 2023-10-11 DEVICE — CLIP APPLIER COVIDIEN ENDOCLIP II 10MM MED/LG: Type: IMPLANTABLE DEVICE | Status: FUNCTIONAL

## 2023-10-11 DEVICE — URETERAL CATH OPEN END 5FR 70CM: Type: IMPLANTABLE DEVICE | Status: FUNCTIONAL

## 2023-10-11 RX ORDER — MAGNESIUM CARBONATE 54 MG/5 ML
1 LIQUID (ML) ORAL
Qty: 0 | Refills: 0 | DISCHARGE

## 2023-10-11 RX ORDER — IRBESARTAN AND HYDROCHLOROTHIAZIDE 12.5; 3 MG/1; MG/1
1 TABLET ORAL
Qty: 0 | Refills: 0 | DISCHARGE

## 2023-10-11 RX ORDER — TRAMADOL HYDROCHLORIDE 50 MG/1
1 TABLET ORAL
Qty: 15 | Refills: 0
Start: 2023-10-11 | End: 2023-10-15

## 2023-10-11 RX ORDER — CHOLECALCIFEROL (VITAMIN D3) 125 MCG
1 CAPSULE ORAL
Qty: 0 | Refills: 0 | DISCHARGE

## 2023-10-11 RX ORDER — FENTANYL CITRATE 50 UG/ML
25 INJECTION INTRAVENOUS
Refills: 0 | Status: DISCONTINUED | OUTPATIENT
Start: 2023-10-11 | End: 2023-10-11

## 2023-10-11 RX ORDER — SODIUM CHLORIDE 9 MG/ML
3 INJECTION INTRAMUSCULAR; INTRAVENOUS; SUBCUTANEOUS EVERY 8 HOURS
Refills: 0 | Status: DISCONTINUED | OUTPATIENT
Start: 2023-10-11 | End: 2023-10-11

## 2023-10-11 RX ORDER — ZINC SULFATE TAB 220 MG (50 MG ZINC EQUIVALENT) 220 (50 ZN) MG
1 TAB ORAL
Qty: 0 | Refills: 0 | DISCHARGE

## 2023-10-11 RX ORDER — POTASSIUM CHLORIDE 20 MEQ
1 PACKET (EA) ORAL
Refills: 0 | DISCHARGE

## 2023-10-11 RX ORDER — IBUPROFEN 200 MG
400 TABLET ORAL ONCE
Refills: 0 | Status: DISCONTINUED | OUTPATIENT
Start: 2023-10-11 | End: 2023-10-25

## 2023-10-11 RX ORDER — HYDROMORPHONE HYDROCHLORIDE 2 MG/ML
0.5 INJECTION INTRAMUSCULAR; INTRAVENOUS; SUBCUTANEOUS
Refills: 0 | Status: DISCONTINUED | OUTPATIENT
Start: 2023-10-11 | End: 2023-10-11

## 2023-10-11 RX ORDER — CHLORHEXIDINE GLUCONATE 213 G/1000ML
1 SOLUTION TOPICAL ONCE
Refills: 0 | Status: DISCONTINUED | OUTPATIENT
Start: 2023-10-11 | End: 2023-10-11

## 2023-10-11 RX ORDER — ACETAMINOPHEN 500 MG
1000 TABLET ORAL ONCE
Refills: 0 | Status: COMPLETED | OUTPATIENT
Start: 2023-10-11 | End: 2023-10-11

## 2023-10-11 RX ORDER — HYDROMORPHONE HYDROCHLORIDE 2 MG/ML
0.25 INJECTION INTRAMUSCULAR; INTRAVENOUS; SUBCUTANEOUS
Refills: 0 | Status: DISCONTINUED | OUTPATIENT
Start: 2023-10-11 | End: 2023-10-11

## 2023-10-11 RX ORDER — LIDOCAINE HCL 20 MG/ML
0.2 VIAL (ML) INJECTION ONCE
Refills: 0 | Status: DISCONTINUED | OUTPATIENT
Start: 2023-10-11 | End: 2023-10-11

## 2023-10-11 RX ORDER — FENTANYL CITRATE 50 UG/ML
50 INJECTION INTRAVENOUS ONCE
Refills: 0 | Status: DISCONTINUED | OUTPATIENT
Start: 2023-10-11 | End: 2023-10-11

## 2023-10-11 RX ORDER — POTASSIUM CHLORIDE 20 MEQ
10 PACKET (EA) ORAL
Refills: 0 | Status: DISCONTINUED | OUTPATIENT
Start: 2023-10-11 | End: 2023-10-11

## 2023-10-11 RX ORDER — POTASSIUM CHLORIDE 20 MEQ
40 PACKET (EA) ORAL ONCE
Refills: 0 | Status: COMPLETED | OUTPATIENT
Start: 2023-10-11 | End: 2023-10-11

## 2023-10-11 RX ORDER — ONDANSETRON 8 MG/1
4 TABLET, FILM COATED ORAL ONCE
Refills: 0 | Status: COMPLETED | OUTPATIENT
Start: 2023-10-11 | End: 2023-10-11

## 2023-10-11 RX ORDER — CEFAZOLIN SODIUM 1 G
2000 VIAL (EA) INJECTION ONCE
Refills: 0 | Status: COMPLETED | OUTPATIENT
Start: 2023-10-11 | End: 2023-10-11

## 2023-10-11 RX ADMIN — Medication 400 MILLIGRAM(S): at 15:13

## 2023-10-11 RX ADMIN — Medication 40 MILLIEQUIVALENT(S): at 14:26

## 2023-10-11 RX ADMIN — ONDANSETRON 4 MILLIGRAM(S): 8 TABLET, FILM COATED ORAL at 17:32

## 2023-10-11 RX ADMIN — HYDROMORPHONE HYDROCHLORIDE 0.5 MILLIGRAM(S): 2 INJECTION INTRAMUSCULAR; INTRAVENOUS; SUBCUTANEOUS at 11:17

## 2023-10-11 RX ADMIN — HYDROMORPHONE HYDROCHLORIDE 0.5 MILLIGRAM(S): 2 INJECTION INTRAMUSCULAR; INTRAVENOUS; SUBCUTANEOUS at 11:25

## 2023-10-11 RX ADMIN — Medication 1000 MILLIGRAM(S): at 15:45

## 2023-10-11 RX ADMIN — HYDROMORPHONE HYDROCHLORIDE 0.5 MILLIGRAM(S): 2 INJECTION INTRAMUSCULAR; INTRAVENOUS; SUBCUTANEOUS at 10:52

## 2023-10-11 NOTE — BRIEF OPERATIVE NOTE - NSICDXBRIEFPROCEDURE_GEN_ALL_CORE_FT
PROCEDURES:  Intraoperative cholangiogram 11-Oct-2023 09:57:16  Jeevan Aden  Cholecystectomy, laparoscopic, with umbilical hernia repair 11-Oct-2023 09:57:45  Jeevan Aden

## 2023-10-11 NOTE — BRIEF OPERATIVE NOTE - NSICDXBRIEFPREOP_GEN_ALL_CORE_FT
PRE-OP DIAGNOSIS:  Biliary colic 11-Oct-2023 09:58:07  Jeevan Aden  Choledocholithiasis 11-Oct-2023 09:58:14  Jeevan Aden

## 2023-10-11 NOTE — ASU DISCHARGE PLAN (ADULT/PEDIATRIC) - MEDICATION INSTRUCTIONS
May take Tylenol 975mg every 6 hours as needed for mild/moderate pain, Tramadol 50mg every 8 hours for severe pain

## 2023-10-11 NOTE — ASU DISCHARGE PLAN (ADULT/PEDIATRIC) - ASU DC SPECIAL INSTRUCTIONSFT
- Avoid heavy lifting anything over 5lbs for six weeks following your surgery date. Do NOT drive a car or operate machinery unless cleared by your transplant surgeon during your follow up visit. Avoid straining, use stool softners as needed. Stop stool softners if diarrhea develops.   - Call transplant clinic ([phone]) if you develop fever (over 100.4F), increased abdominal pain, redness/swelling or bleeding around your incision site  - Call transplant clinic if you have difficulty urinating, notice decrease in urine output or blood in urine.   - Bathing: Shower is allowed, you can let soap and water flow over your incision; do NOT rub the area. Bath is NOT allowed until your incision is healed and you have been cleared by your transplant surgeon.   - Continue to practice social distancing and wear face mask when appropriate

## 2023-10-11 NOTE — BRIEF OPERATIVE NOTE - NSICDXBRIEFPOSTOP_GEN_ALL_CORE_FT
POST-OP DIAGNOSIS:  Choledocholithiasis 11-Oct-2023 09:58:26  Jeevan Aden  Biliary colic 11-Oct-2023 09:58:33  Jeevan Aden

## 2023-10-11 NOTE — ASU DISCHARGE PLAN (ADULT/PEDIATRIC) - CARE PROVIDER_API CALL
Miguelito Rivera [facility]  Surgery  [address] [place] [id number]  Phone: [phone]  Fax: [phone]  Follow Up Time: 2 weeks

## 2023-10-16 PROBLEM — K81.9 CHOLECYSTITIS, UNSPECIFIED: Chronic | Status: ACTIVE | Noted: 2023-10-04

## 2023-10-16 PROBLEM — H16.002 UNSPECIFIED CORNEAL ULCER, LEFT EYE: Chronic | Status: ACTIVE | Noted: 2023-10-04

## 2023-10-17 LAB
AFP-TM SERPL-MCNC: 4.4 NG/ML
ALBUMIN SERPL ELPH-MCNC: 4.4 G/DL
ALP BLD-CCNC: 104 U/L
ALT SERPL-CCNC: 30 U/L
ANION GAP SERPL CALC-SCNC: 15 MMOL/L
AST SERPL-CCNC: 27 U/L
BILIRUB SERPL-MCNC: 0.4 MG/DL
BUN SERPL-MCNC: 16 MG/DL
CALCIUM SERPL-MCNC: 9.1 MG/DL
CHLORIDE SERPL-SCNC: 95 MMOL/L
CO2 SERPL-SCNC: 28 MMOL/L
CREAT SERPL-MCNC: 0.87 MG/DL
EGFR: 72 ML/MIN/1.73M2
INR PPP: 1.15 RATIO
POTASSIUM SERPL-SCNC: 3.8 MMOL/L
PROT SERPL-MCNC: 7.4 G/DL
PT BLD: 12.9 SEC
SODIUM SERPL-SCNC: 138 MMOL/L

## 2023-10-18 ENCOUNTER — APPOINTMENT (OUTPATIENT)
Dept: HEPATOLOGY | Facility: CLINIC | Age: 68
End: 2023-10-18
Payer: MEDICARE

## 2023-10-18 VITALS
HEART RATE: 67 BPM | WEIGHT: 167 LBS | OXYGEN SATURATION: 99 % | HEIGHT: 68 IN | SYSTOLIC BLOOD PRESSURE: 110 MMHG | DIASTOLIC BLOOD PRESSURE: 80 MMHG | TEMPERATURE: 97.1 F | BODY MASS INDEX: 25.31 KG/M2

## 2023-10-18 DIAGNOSIS — K86.2 CYST OF PANCREAS: ICD-10-CM

## 2023-10-18 PROCEDURE — 99214 OFFICE O/P EST MOD 30 MIN: CPT

## 2023-10-20 ENCOUNTER — APPOINTMENT (OUTPATIENT)
Dept: DERMATOLOGY | Facility: CLINIC | Age: 68
End: 2023-10-20

## 2023-10-26 ENCOUNTER — APPOINTMENT (OUTPATIENT)
Dept: TRANSPLANT | Facility: CLINIC | Age: 68
End: 2023-10-26
Payer: MEDICARE

## 2023-10-26 VITALS
SYSTOLIC BLOOD PRESSURE: 122 MMHG | BODY MASS INDEX: 25.46 KG/M2 | WEIGHT: 168 LBS | HEIGHT: 68 IN | RESPIRATION RATE: 18 BRPM | HEART RATE: 66 BPM | DIASTOLIC BLOOD PRESSURE: 79 MMHG | OXYGEN SATURATION: 100 %

## 2023-10-26 DIAGNOSIS — K80.20 CALCULUS OF GALLBLADDER W/OUT CHOLECYSTITIS W/OUT OBSTRUCTION: ICD-10-CM

## 2023-10-26 PROCEDURE — 99024 POSTOP FOLLOW-UP VISIT: CPT

## 2023-12-21 ENCOUNTER — APPOINTMENT (OUTPATIENT)
Dept: ULTRASOUND IMAGING | Facility: IMAGING CENTER | Age: 68
End: 2023-12-21

## 2023-12-21 ENCOUNTER — APPOINTMENT (OUTPATIENT)
Dept: MAMMOGRAPHY | Facility: IMAGING CENTER | Age: 68
End: 2023-12-21

## 2023-12-27 ENCOUNTER — OUTPATIENT (OUTPATIENT)
Dept: OUTPATIENT SERVICES | Facility: HOSPITAL | Age: 68
LOS: 1 days | End: 2023-12-27
Payer: MEDICARE

## 2023-12-27 ENCOUNTER — LABORATORY RESULT (OUTPATIENT)
Age: 68
End: 2023-12-27

## 2023-12-27 ENCOUNTER — APPOINTMENT (OUTPATIENT)
Dept: MRI IMAGING | Facility: CLINIC | Age: 68
End: 2023-12-27
Payer: MEDICARE

## 2023-12-27 DIAGNOSIS — Z98.890 OTHER SPECIFIED POSTPROCEDURAL STATES: Chronic | ICD-10-CM

## 2023-12-27 DIAGNOSIS — C22.0 LIVER CELL CARCINOMA: ICD-10-CM

## 2023-12-27 PROCEDURE — A9585: CPT

## 2023-12-27 PROCEDURE — 74183 MRI ABD W/O CNTR FLWD CNTR: CPT

## 2023-12-27 PROCEDURE — 74183 MRI ABD W/O CNTR FLWD CNTR: CPT | Mod: 26

## 2024-01-02 PROBLEM — R77.2 ELEVATED AFP: Status: ACTIVE | Noted: 2022-09-30

## 2024-01-02 PROBLEM — R10.9 ABDOMINAL DISCOMFORT: Status: ACTIVE | Noted: 2023-01-06

## 2024-01-03 ENCOUNTER — APPOINTMENT (OUTPATIENT)
Dept: HEPATOLOGY | Facility: CLINIC | Age: 69
End: 2024-01-03
Payer: MEDICARE

## 2024-01-03 VITALS
TEMPERATURE: 97.5 F | HEART RATE: 72 BPM | OXYGEN SATURATION: 99 % | WEIGHT: 166 LBS | SYSTOLIC BLOOD PRESSURE: 118 MMHG | HEIGHT: 68 IN | BODY MASS INDEX: 25.16 KG/M2 | DIASTOLIC BLOOD PRESSURE: 72 MMHG

## 2024-01-03 DIAGNOSIS — K76.0 FATTY (CHANGE OF) LIVER, NOT ELSEWHERE CLASSIFIED: ICD-10-CM

## 2024-01-03 PROCEDURE — 99214 OFFICE O/P EST MOD 30 MIN: CPT

## 2024-01-03 NOTE — REVIEW OF SYSTEMS
[As Noted in HPI] : as noted in HPI [Negative] : Heme/Lymph [Fever] : no fever [Chills] : no chills [Itching] : no itching [Confused] : no confusion [FreeTextEntry9] : patient has low-back pain.

## 2024-01-03 NOTE — ASSESSMENT
[FreeTextEntry1] : 68 y/o female with pmhx HTN, fatty liver and cirrhosis secondary to hepatitis C who developed hepatocellular carcinoma in Jan 2022 s/p radioembolization s/p laparoscopic cholecystectomy with umbilical hernia repair on 10/11/23 with Dr. Miguelito Rivera.   #Compensated Cirrhosis secondary to hepatitis C. -Completed treatment for hepatitis C with Pegasys, ribavirin plus Sovaldi on 6/13/14. She is a sustained virological responder. -Esophageal / gastric varices: EGD from 8/10/23 revealed no varices.  #Hepatocellular carcinoma -Initially Dx on MRI from 1/10/22 a 1.6 cm right hepatic lobe hepatocellular carcinoma (LR-5 Definitely HCC). Alpha-fetoprotein was 35 at the time. s/p Y90 4/21/22 to segment 7/8 with Dr Olmedo. MRI from 6/28/23 demonstrates cirrhosis and post radioembolization changes in the right hepatic lobe. No evidence of recurrent disease (LR-TR nonviable). No new hepatic lesions. AFPs have been normal, last done 10/16/23 4.4.  MRI on 12/27 is still pending and has an appt with Dr. Page (IR) on 1/5 to review.  -Chest CT 3/17/22 showed no evidence of intrathoracic metastasis, NM bone imaging 3/17/22 showed no scan evidence of osseous metastasis.  #NAFLD Encouraged maintaining a diet low in carbohydrates, fat and cholesterol, healthy fats (avocados and almonds), lean meats and whole grains.  #S/p laparoscopic cholecystectomy with umbilical hernia repair on 10/11/23 with Dr. Miguelito Rivera. She is doing well without signs of decompensation post-surgery.  She has a small surgical thread protruding from one incision and will contract Dr. Rivera.   Plan: RTC 3-4 months with another provider since I'm leaving the practice.

## 2024-01-03 NOTE — PHYSICAL EXAM
[General Appearance - Alert] : alert [General Appearance - In No Acute Distress] : in no acute distress [Sclera] : the sclera and conjunctiva were normal [] : no respiratory distress [Respiration, Rhythm And Depth] : normal respiratory rhythm and effort [Edema] : there was no peripheral edema [Abdomen Soft] : soft [Abdomen Tenderness] : non-tender [Abdomen Mass (___ Cm)] : no abdominal mass palpated [Abdomen Hernia] : no hernia was discovered [Oriented To Time, Place, And Person] : oriented to person, place, and time [Affect] : the affect was normal [Scleral Icterus] : No Scleral Icterus [Spider Angioma] : No spider angioma(s) were observed [Abdominal  Ascites] : no ascites [Asterixis] : no asterixis observed [Jaundice] : No jaundice [Palmar Erythema] : no Palmar Erythema [FreeTextEntry1] : well healing incision sites, small surgical thread protruding from one incision.

## 2024-01-03 NOTE — HISTORY OF PRESENT ILLNESS
[de-identified] : 67 y/o female with pmhx HTN, fatty liver and cirrhosis secondary to hepatitis C who developed hepatocellular carcinoma in Jan 2022 s/p radioembolization here for follow up.   -1/3/24 Reports that she had Covid end of Oct and has recovered well. S/p laparoscopic cholecystectomy with umbilical hernia repair on 10/11/23 with Dr. Miguelito Rivera, doing well. No abdo pain. States that she still has a small piece of plastic string sticking out at one of her incisions and is irritating her. MRI don on 12/27 and is still pending.   -10/18/23 She underwent laparoscopic cholecystectomy with umbilical hernia repair on 10/11/23 with Dr. Miguelito Rivera. She still has pain at the incision sites but improving and only needs Tylenol 3 times since the surgery. She has a F/U appt with him next week.  She otherwise doing well.   -8/16/23 She was hospitalized at [facility] from 8/6-8/10 for RUQ pain radiating to her back. CTAP noted cholelithiasis without cholecystitis, also noted possible choledocholithiasis with tiny 2 mm stone in the distal CBD. MRCP (8/6)- Minimal debris and/or tiny distal common bile duct stones. Underwent ERCP on 8/7, pancreatic duct stent placed during ERCP to reduce risk of pancreatitis. Was recommended elective cholecystectomy however pt wants to consider as outpatient. She is hesitant about surgery and who will perform it. Since discharge, still has pain but mild and decreasing.    6/14/23 Still has intermittent chronic right mid abdominal discomfort, no changes. She otherwise feels well.  No fluid overload. No LLE. Mental status is clear. She is scheduled for repeat abdo MRI on 6/28. No recent BW. Fibroscan test on 6/14/23 showed F4 (44.6 kPa), S1 ().   3/14/23 Feels well but has been experiencing chronic intermittent right mid abdominal discomfort, which radiates to her back. Denies nausea, vomiting, melena, hematochezia, or hematemesis. Has hx of gallstones, was previously advised to have cholecystectomy, but deferred.   11/15/22 Has neck pain receiving PT/acupuncture for few months with improvement. Had pain in her right abdomen in the past but not in the last few months. States that she feels well. Has not been eating as much as she used to few yrs ago and losing weight slowly, now 170 lbs, BMI 26. Reports had stool testing for colon cancer screening last month with PCP and was neg.  Abdo MRI 9/28/22 showed stable post radioembolization changes without evidence of viable tumor. No new lesions.  AFP 7.4 on 9/29/22.   7/20/22: She currently feels well and has been gardening since she is avoiding crowded places. No abdominal pain. Has right shoulder pain which improved after taking Gabapentin.   She completed treatment for hepatitis C with Pegasys, ribavirin plus Sovaldi on 6/13/14.  She was found to have a lesion on ultrasound with an elevated alpha-fetoprotein and an MRI January 10, 2022 showed a 1.6 cm LIRADS 5 lesion in segment 5. She was seen by Dr. Franklin Olmedo (IR) and had radioembolization of liver tumor on 4/21/22.   Upper endoscopy from 10/5/2015 revealed no varices. Colonoscopy from 10/5/2015 which revealed internal hemorrhoids. She will need a repeat colonoscopy in 2025  FibroScan December 20, 2021 F4, S0 FibroScan June 16, 2020 F3, S3 Fibroscan December 14, 2017 with F3 (11.9 kPa), S1 disease A fibroscan performed on December 14, 2016 showed F3, S3  disease. (11.8 kPa) A fibroscan performed on October 13, 2015 showed F4 disease. A liver biopsy from July 25, 2006 reveals stage III disease with steatosis.  MRI 6/28/22 no evidence of residual tumor. No new lesions.   MRI from 1/10/22 demonstrates a cirrhotic liver with a new 1.6 cm right hepatic lobe hepatocellular carcinoma (LR-5 Definitely HCC). Alpha-fetoprotein was 35  An abdominal sonogram December 13, 2021 with a 1.6 x 1.4 cm lesion in the right lobe of the liver.  Blood test December 14, 2021 INR 1.17, alpha-fetoprotein 35.7, ALT 25, creatinine 0.9  Blood tests April 5, 2021 platelet count 160,000, ALT 25, AST 25, total bilirubin 0.4, alkaline phosphatase 93, creatinine 0.96, INR 1.12, alpha-fetoprotein 11.2, abdominal sonogram April 5, 2021 with no lesions in the liver  Blood test June 23, 2020 platelet count 148,000, alpha-fetoprotein 6.8, ALT 21, AST 21, creatinine 0.92, total bilirubin 0.6, INR 1.16, abdominal sonogram May 14, 2020 with a cirrhotic liver without lesions  Blood tests  November 11, 2019 platelet count 150,000, alpha-fetoprotein 7.5, creatinine 0.93, ALT 25, AST 24, total bilirubin 0.5, INR 1.13. Abdominal sonogram November 7, 2019 without lesions in the liver  Abdominal sonogram May 15, 2019 shows a cirrhotic liver without lesions. Blood tests May 15, 2019 alpha-fetoprotein 7.5, INR 1.1, creatinine 0.83, ALT 28, alkaline phosphatase 82, platelet count [id number]  Blood tests November 3, 2018 platelet count 169,000, ALT 25, AST 24, alkaline phosphatase 101, creatinine 0.86, alpha-fetoprotein 7.3, INR 1.1, abdominal sonogram November 13, 2018 with no lesions in the liver  Blood tests May 2018 platelet count 145,000, INR 1.11, total bilirubin 0.5, ALT 22, alkaline phosphatase 100, creatinine 0.93. An abdominal sonogram May 1, 2008 shows no lesions in the liver  Blood test December 12, 2017 ALT 23, AST 26, alkaline phosphatase one of 2, creatinine 0.93  Blood tests October 19, 2017 INR 1.1, platelet count 162,000, ALT 62, AST 54, total bilirubin 0.7, alkaline phosphatase 114, alpha-fetoprotein 7.6.  An abdominal sonogram October 19, 2017 with no lesions in the liver. She does have gallstones  Blood test March 29, 2017 creatinine 0.3, ALT 28, INR 1.11, alpha-fetoprotein 8. Abdominal sonogram October 31, 2016 shows no lesions in the liver  Blood tests September 27, 2016 INR 1.09, ALT 30, alcohol and prostatism 3, creatinine 0.86, alpha-fetoprotein 8.6, HCV RNA not detected, platelet count 145,000  Sonogram March 25, 2016 with gallstones. There were no lesions in the liver.  Blood test for 2016 INR 1.14, platelet count 150,000, creatinine 0.95, ALT 24, alkaline phosphatase 116, alpha-fetoprotein 9.5  Sonogram September 29 2015 gallstones without lesions in the liver   Abdominal sonogram from February 19, 2015 shows no hepatic lesions, she has multiple small gallstones.

## 2024-01-05 ENCOUNTER — APPOINTMENT (OUTPATIENT)
Dept: INTERVENTIONAL RADIOLOGY/VASCULAR | Facility: CLINIC | Age: 69
End: 2024-01-05

## 2024-01-05 DIAGNOSIS — R10.9 UNSPECIFIED ABDOMINAL PAIN: ICD-10-CM

## 2024-01-05 DIAGNOSIS — R77.2 ABNORMALITY OF ALPHAFETOPROTEIN: ICD-10-CM

## 2024-01-05 PROCEDURE — XXXXX: CPT | Mod: 1L

## 2024-01-05 NOTE — HISTORY OF PRESENT ILLNESS
[FreeTextEntry1] : This visit was conducted via telehealth. Pt was at home and provider was at [facility].  This is a 68 year old female with hx of fatty liver, cirrhosis secondary to hepatitis C, and HCC who presents today for follow up for liver directed therapy.  She completed treatment for hepatitis C with Pegasys, ribavirin plus Sovaldi on 6/13/14. She was found to have a lesion on ultrasound with an elevated alpha-fetoprotein and an MRI January 10, 2022 showed a 1.6 cm LIRADS 5 lesion in segment 5.   Pt was referred to IR by Dr. Cherry in 2/2022 to discuss liver directed therapy. She is now s/p Y90 to segment 7/8 on 4/21/22. She presents today for follow up after imaging to discuss findings.  Reports to be feeling well, denies discoloration of the skin/eyes, abdominal distention, LE edema, confusion, hematochezia, melena, or n/v/d.   Previously, she reported how she had been experiencing some right mid abdominal discomfort, which radiated to her back. Denied n/v, or change in bowel habits. Reports a hx of gallstones, was previously advised to have cholecystectomy, but deferred due to acute issues with her liver. More recently she was able to have her cholecystectyomy on 10/11/23. She feels much better since her surgery. She has not had any recent episodes of symptoms. Reports good energy and appetite level.   She had covid in 10/2023. Now back to baseline.   Hep: Jo Ann / Jessi Torres PMD: Moe  [0] : ~His/Her~ pain was 0 out of 10

## 2024-01-05 NOTE — REASON FOR VISIT
[Home] : at home, [unfilled] , at the time of the visit. [Medical Office: ([facility])___] : at the medical office located in  [Patient] : the patient [Follow-Up: _____] : a [unfilled] follow-up visit

## 2024-01-05 NOTE — REASON FOR VISIT
[Home] : at home, [unfilled] , at the time of the visit. [Medical Office: ([facility])___] : at the medical office located in  [Patient] : the patient [Follow-Up: _____] : a [unfilled] follow-up visit

## 2024-01-05 NOTE — ASSESSMENT
[FreeTextEntry1] : This is a 68 year old female with hx of fatty liver, cirrhosis secondary to hepatitis C, and HCC who presents today for liver directed therapy.  1. HCC - now s/p Y90 4/21/22 to segment 7/8 - reports feeling well with good energy and appetite level - MRI from 12/27/23 demonstrates > Stable radioembolization change of the right hepatic lobe without evidence of viable or recurrent tumor; LR-TR nonviable. >  No new liver lesion. - Dr. Page has reviewed her imaging and I discussed the results with Ms. FOREMAN - the imaging does not demonstrate any evidence of viable tumor. It has now been over 1 year since her last treatment, so will repeat imaging in 6 months. - f/u telehealth after imaging to review the results - CBC, CMP, PT/INR reviewed from 12/28~ at baseline - while there is no indication for intervention at this time, I reinforced the importance of continued serial imaging surveillance - f.u MR in 6/2023 - f.u with IR after - continue to follow with hepatology / NP Tiev  2. Elevated AFP - was 20.4 in 5/2022 - has been normal since July 2022 - last level 4.4 on 10/17/23 - continue to trend with hepatology team  3. Chronic Intermittent RUQ Pain - hx of cholelithiasis - pt with hx of intermittent RUQ that radiates to her back - states interval improvement with dietary modification - now s/p cholecystectomy 10/2023- reportedly feeling better since surgery - haven't had in several months - continue follow up with GI  I have provided the patient the opportunity to ask questions and have answered them to their satisfaction. They are encouraged to contact our office with any further questions, concerns, or issues.  Above case and plan discussed with Dr. Page [Other: _____] : [unfilled]

## 2024-01-05 NOTE — HISTORY OF PRESENT ILLNESS
[FreeTextEntry1] : This visit was conducted via telehealth. Pt was at home and provider was at [facility].  This is a 68 year old female with hx of fatty liver, cirrhosis secondary to hepatitis C, and HCC who presents today for follow up for liver directed therapy.  She completed treatment for hepatitis C with Pegasys, ribavirin plus Sovaldi on 6/13/14. She was found to have a lesion on ultrasound with an elevated alpha-fetoprotein and an MRI January 10, 2022 showed a 1.6 cm LIRADS 5 lesion in segment 5.   Pt was referred to IR by Dr. Cherry in 2/2022 to discuss liver directed therapy. She is now s/p Y90 to segment 7/8 on 4/21/22. She presents today for follow up after imaging to discuss findings.  Reports to be feeling well, denies discoloration of the skin/eyes, abdominal distention, LE edema, confusion, hematochezia, melena, or n/v/d.   Previously, she reported how she had been experiencing some right mid abdominal discomfort, which radiated to her back. Denied n/v, or change in bowel habits. Reports a hx of gallstones, was previously advised to have cholecystectomy, but deferred due to acute issues with her liver. More recently she was able to have her cholecystectyomy on 10/11/23. She feels much better since her surgery. She has not had any recent episodes of symptoms. Reports good energy and appetite level.   She had covid in 10/2023. Now back to baseline.   Hep: Jo Ann / Jessi Torres PMD: Moe  [0] : ~His/Her~ pain was 0 out of 10

## 2024-01-11 ENCOUNTER — APPOINTMENT (OUTPATIENT)
Dept: TRANSPLANT | Facility: CLINIC | Age: 69
End: 2024-01-11
Payer: MEDICARE

## 2024-01-11 VITALS
BODY MASS INDEX: 24.25 KG/M2 | HEIGHT: 68 IN | DIASTOLIC BLOOD PRESSURE: 78 MMHG | WEIGHT: 160 LBS | OXYGEN SATURATION: 98 % | HEART RATE: 81 BPM | SYSTOLIC BLOOD PRESSURE: 118 MMHG

## 2024-01-11 PROCEDURE — 99213 OFFICE O/P EST LOW 20 MIN: CPT

## 2024-01-11 PROCEDURE — 15853 REMOVAL SUTR/STAPL XREQ ANES: CPT

## 2024-01-29 NOTE — ASSESSMENT
[FreeTextEntry1] : Well sp lap jose cruz with IOC.  Had stitch at subcostal 5mm port site which was removed at bedside.  otherwise she is well without complaints.  she is arranging hepatology f/u for surveillance scanning.

## 2024-01-29 NOTE — PHYSICAL EXAM
[Normal Breath Sounds] : Normal breath sounds [No HSM] : no hepatosplenomegaly [No Rash or Lesion] : No rash or lesion [Alert] : alert [Oriented to Person] : oriented to person [Oriented to Place] : oriented to place [Oriented to Time] : oriented to time [Calm] : calm [de-identified] : NAD [de-identified] : NC/AT

## 2024-01-29 NOTE — HISTORY OF PRESENT ILLNESS
[de-identified] : 66yo F h/o HCV cirrhosis and HCC sp radioembolization (1/22) who presents w h/o choledocholithiasis and biliary colic.   Had ERCP 8/7 to evacuate distal DBC stones, stent placed.   RUQ pain is intermittent, radiates to back.  Has had multiple imaging studies demonstrating cholelithiasis.  No h/o portal HTN sx.   INR 1.1, , Tb 0.4,  Na 143.   [de-identified] : 10/26/23 - had lap jose cruz with IOC.  No choledocholithiasis.  D/c POD0.  No issues at home.  Tolerating diet (avoiding fatty foods), with nl BMs.  No incisional issues.  No analgesic requirements.  10/28/23 contracted COVID from live in son. dry cough fever GI nausea and diarrhea. lasted a little over a week. self medicated with OTC Tylenol for symptom relief with postitive effect. was not hospitalized and is now fully recovered.   1/11/24: doing well.  had c/o stitch extruding from skin.  this was cut down on and excised.  otherwise MRI with no e/o recurrence.  will sched f/u with Dr. Darnell

## 2024-03-21 ENCOUNTER — NON-APPOINTMENT (OUTPATIENT)
Age: 69
End: 2024-03-21

## 2024-03-25 ENCOUNTER — APPOINTMENT (OUTPATIENT)
Dept: OPHTHALMOLOGY | Facility: CLINIC | Age: 69
End: 2024-03-25
Payer: MEDICARE

## 2024-03-25 ENCOUNTER — NON-APPOINTMENT (OUTPATIENT)
Age: 69
End: 2024-03-25

## 2024-03-25 PROCEDURE — 92012 INTRM OPH EXAM EST PATIENT: CPT

## 2024-03-27 ENCOUNTER — APPOINTMENT (OUTPATIENT)
Dept: HEPATOLOGY | Facility: CLINIC | Age: 69
End: 2024-03-27
Payer: MEDICARE

## 2024-03-27 VITALS
BODY MASS INDEX: 25.16 KG/M2 | OXYGEN SATURATION: 96 % | HEIGHT: 68 IN | HEART RATE: 72 BPM | DIASTOLIC BLOOD PRESSURE: 82 MMHG | SYSTOLIC BLOOD PRESSURE: 130 MMHG | RESPIRATION RATE: 18 BRPM | WEIGHT: 166 LBS | TEMPERATURE: 97.1 F

## 2024-03-27 PROCEDURE — 99214 OFFICE O/P EST MOD 30 MIN: CPT

## 2024-03-30 NOTE — ASSESSMENT
[FreeTextEntry1] : 67 y/o female with pmhx HTN, fatty liver and cirrhosis secondary to hepatitis C who developed hepatocellular carcinoma in Jan 2022 s/p radioembolization s/p laparoscopic cholecystectomy with umbilical hernia repair on 10/11/23 with Dr. Miguelito Rivera. Most recent MRI 12/23 showing stable radioembolization change of the right hepatic lobe without evidence of viable or recurrent tumor; LR-TR nonviable.   PLAN #Compensated cirrhosis due to hepatitis C (No history of esophageal varices, ascites, hepatic encephalopathy): - MELD score was 7 on 12/23. -She successfully completed treatment for hepatitis C using Pegasys, ribavirin, and Sovaldi on 6/13/14, achieving sustained virological response. -Today, we will repeat MELD labs. -Regarding esophageal/gastric varices, an esophagogastroduodenoscopy (EGD) conducted on 8/10/23 showed no varices.  #Hepatocellular carcinoma Initially diagnosed on MRI from 1/10/22 with a 1.6 cm hepatocellular carcinoma in the right hepatic lobe (LR-5 Definitely HCC). At that time, alpha-fetoprotein levels were 35. She underwent Y90 treatment on 4/21/22 for segments 7/8 with Dr. Olmedo. An MRI from 6/28/23 revealed cirrhosis and post-radioembolization changes in the right hepatic lobe. There was no evidence of recurrent disease (LR-TR nonviable), and no new hepatic lesions were identified. Alpha-fetoprotein levels have been within normal range, with the last measurement on 10/16/23 at 4.4. An MRI on 12/27 showed no tumor recurrence and stable radioembolization changes.  A repeat MRI has been ordered today and will be performed on 6/24. Additionally, alpha-fetoprotein (AFP) and bj-gamma-carboxy prothrombin (DCP) have been ordered today for further evaluation.  # Metabolic Dysfunction Associated Steatoic Liver Disease (MASLD) -We have discussed with him that lifestyle modifications are crucial for management of MASLD. Encouraged patient to follow a diet with daily vegetables, fruits, whole grains and healthy fats, such as a Mediterranean diet. I spoke with the patient at length regarding fatty liver disease and reviewed the spectrum of disease as well as the risk of disease progression. I have explained that patient with fatty liver disease may progress to the development of cirrhosis and its complications, and that fatty liver disease may develop liver cancer without cirrhosis and therefore should be screen yearly with an abdominal ultrasound. I explained that weight loss could improve hepatic steatosis and possibly fibrosis. I recommended gradual weight loss of 5-10% of his body weight through dietary changes and moderate intensity exercise for 20 minutes at least 3 times per week. These changes have been shown to lead to regression or even resolution of steatosis, inflammation, and even fibrosis in some patients.  -We have also discussed the recent FDA-approved pharmacologic treatment options REZDIFFRA for MASH (Metabolic Dysfunction Associated Steatohepatitis). This drug is not commercially available as of yet. We have discussed the possibility of clinical trial referral/enrollment. -Bloodwork will be conducted today, and an MRI has been scheduled for HCC surveillance, set to be performed in June 2024.  RTC 6 months

## 2024-03-30 NOTE — HISTORY OF PRESENT ILLNESS
[de-identified] : 67 y/o female with pmhx HTN, fatty liver and cirrhosis secondary to hepatitis C who developed hepatocellular carcinoma in 2022 s/p radioembolization here for follow up.  Interval HPI (3/27/24): Patient states she went to her best friends  Friday and Saturday. On , she developed fever, runny nose, watery eyes, and nonproductive cough. Patient is overall better but still has runny nose. MELD from  was 7.  -1/3/24 Reports that she had Covid end of Oct and has recovered well. S/p laparoscopic cholecystectomy with umbilical hernia repair on 10/11/23 with Dr. Miguelito Rivera, doing well. No abdo pain. States that she still has a small piece of plastic string sticking out at one of her incisions and is irritating her. MRI don on  and is still pending.  -10/18/23 She underwent laparoscopic cholecystectomy with umbilical hernia repair on 10/11/23 with Dr. Miguelito Rivera. She still has pain at the incision sites but improving and only needs Tylenol 3 times since the surgery. She has a F/U appt with him next week. She otherwise doing well.  -23 She was hospitalized at [facility] from -8/10 for RUQ pain radiating to her back. CTAP noted cholelithiasis without cholecystitis, also noted possible choledocholithiasis with tiny 2 mm stone in the distal CBD. MRCP ()- Minimal debris and/or tiny distal common bile duct stones. Underwent ERCP on , pancreatic duct stent placed during ERCP to reduce risk of pancreatitis. Was recommended elective cholecystectomy however pt wants to consider as outpatient. She is hesitant about surgery and who will perform it. Since discharge, still has pain but mild and decreasing.  23 Still has intermittent chronic right mid abdominal discomfort, no changes. She otherwise feels well. No fluid overload. No LLE. Mental status is clear. She is scheduled for repeat abdo MRI on . No recent BW. Fibroscan test on 23 showed F4 (44.6 kPa), S1 ().  3/14/23 Feels well but has been experiencing chronic intermittent right mid abdominal discomfort, which radiates to her back. Denies nausea, vomiting, melena, hematochezia, or hematemesis. Has hx of gallstones, was previously advised to have cholecystectomy, but deferred.  11/15/22 Has neck pain receiving PT/acupuncture for few months with improvement. Had pain in her right abdomen in the past but not in the last few months. States that she feels well. Has not been eating as much as she used to few yrs ago and losing weight slowly, now 170 lbs, BMI 26. Reports had stool testing for colon cancer screening last month with PCP and was neg. Abdo MRI 22 showed stable post radioembolization changes without evidence of viable tumor. No new lesions. AFP 7.4 on 22.  22: She currently feels well and has been gardening since she is avoiding crowded places. No abdominal pain. Has right shoulder pain which improved after taking Gabapentin.  She completed treatment for hepatitis C with Pegasys, ribavirin plus Sovaldi on 14. She was found to have a lesion on ultrasound with an elevated alpha-fetoprotein and an MRI January 10, 2022 showed a 1.6 cm LIRADS 5 lesion in segment 5. She was seen by Dr. Franklin Olmedo (IR) and had radioembolization of liver tumor on 22.  Upper endoscopy from 10/5/2015 revealed no varices. Colonoscopy from 10/5/2015 which revealed internal hemorrhoids. She will need a repeat colonoscopy in   FibroScan 2021 F4, S0 FibroScan 2020 F3, S3 Fibroscan 2017 with F3 (11.9 kPa), S1 disease A fibroscan performed on 2016 showed F3, S3 disease. (11.8 kPa) A fibroscan performed on 2015 showed F4 disease. A liver biopsy from 2006 reveals stage III disease with steatosis.  MRI 22 no evidence of residual tumor. No new lesions.  MRI from 1/10/22 demonstrates a cirrhotic liver with a new 1.6 cm right hepatic lobe hepatocellular carcinoma (LR-5 Definitely HCC). Alpha-fetoprotein was 35  An abdominal sonogram 2021 with a 1.6 x 1.4 cm lesion in the right lobe of the liver.  Blood test 2021 INR 1.17, alpha-fetoprotein 35.7, ALT 25, creatinine 0.9  Blood tests 2021 platelet count 160,000, ALT 25, AST 25, total bilirubin 0.4, alkaline phosphatase 93, creatinine 0.96, INR 1.12, alpha-fetoprotein 11.2, abdominal sonogram 2021 with no lesions in the liver  Blood test 2020 platelet count 148,000, alpha-fetoprotein 6.8, ALT 21, AST 21, creatinine 0.92, total bilirubin 0.6, INR 1.16, abdominal sonogram May 14, 2020 with a cirrhotic liver without lesions  Blood tests 2019 platelet count 150,000, alpha-fetoprotein 7.5, creatinine 0.93, ALT 25, AST 24, total bilirubin 0.5, INR 1.13. Abdominal sonogram 2019 without lesions in the liver  Abdominal sonogram May 15, 2019 shows a cirrhotic liver without lesions. Blood tests May 15, 2019 alpha-fetoprotein 7.5, INR 1.1, creatinine 0.83, ALT 28, alkaline phosphatase 82, platelet count [id number]  Blood tests November 3, 2018 platelet count 169,000, ALT 25, AST 24, alkaline phosphatase 101, creatinine 0.86, alpha-fetoprotein 7.3, INR 1.1, abdominal sonogram 2018 with no lesions in the liver  Blood tests May 2018 platelet count 145,000, INR 1.11, total bilirubin 0.5, ALT 22, alkaline phosphatase 100, creatinine 0.93. An abdominal sonogram May 1, 2008 shows no lesions in the liver  Blood test 2017 ALT 23, AST 26, alkaline phosphatase one of 2, creatinine 0.93  Blood tests 2017 INR 1.1, platelet count 162,000, ALT 62, AST 54, total bilirubin 0.7, alkaline phosphatase 114, alpha-fetoprotein 7.6.  An abdominal sonogram 2017 with no lesions in the liver. She does have gallstones  Blood test 2017 creatinine 0.3, ALT 28, INR 1.11, alpha-fetoprotein 8. Abdominal sonogram 2016 shows no lesions in the liver  Blood tests 2016 INR 1.09, ALT 30, alcohol and prostatism 3, creatinine 0.86, alpha-fetoprotein 8.6, HCV RNA not detected, platelet count 145,000  Sonogram 2016 with gallstones. There were no lesions in the liver.  Blood test for 2016 INR 1.14, platelet count 150,000, creatinine 0.95, ALT 24, alkaline phosphatase 116, alpha-fetoprotein 9.5  Sonogram 2015 gallstones without lesions in the liver   Abdominal sonogram from 2015 shows no hepatic lesions, she has multiple small gallstones.   [FreeTextEntry1] : A 68-year-old female with a past medical history of hypertension (HTN), fatty liver, and cirrhosis due to hepatitis C, who was diagnosed with hepatocellular carcinoma in 2022 and underwent radioembolization, is here for a follow-up visit.  She completed treatment for hepatitis C using Pegasys, ribavirin, and Sovaldi on 14. A lesion was detected on ultrasound along with an elevated alpha-fetoprotein level. An MRI on January 10, 2022, revealed a 1.6 cm LIRADS 5 lesion in segment 5. She was evaluated by Dr. Franklin Olmedo (IR) and underwent radioembolization of the liver tumor on 22.  EGD from 8/10/23 revealed no varices.Colonoscopy from 10/5/2015  revealed internal hemorrhoids. She is due for a repeat colonoscopy in .  2024: The patient mentioned attending her best friend's  on Friday and Saturday. On , she developed fever, runny nose, watery eyes, and a nonproductive cough. She is feeling better overall but still has a runny nose. Her MELD score from  was 7. An MRI from 2023, indicated: Stable changes post-radioembolization in the right hepatic lobe, with no evidence of viable or recurrent tumor (LR-TR nonviable). No new liver lesions were identified.

## 2024-03-30 NOTE — PHYSICAL EXAM
[General Appearance - Alert] : alert [General Appearance - In No Acute Distress] : in no acute distress [PERRL With Normal Accommodation] : pupils were equal in size, round, and reactive to light [Sclera] : the sclera and conjunctiva were normal [Outer Ear] : the ears and nose were normal in appearance [Hearing Threshold Finger Rub Not Walsh] : hearing was normal [Neck Appearance] : the appearance of the neck was normal [] : no respiratory distress [Apical Impulse] : the apical impulse was normal [Bowel Sounds] : normal bowel sounds [Heart Sounds] : normal S1 and S2 [Abdomen Soft] : soft [Abdomen Tenderness] : non-tender [Abnormal Walk] : normal gait [Musculoskeletal - Swelling] : no joint swelling seen [Skin Color & Pigmentation] : normal skin color and pigmentation [Sensation] : the sensory exam was normal to light touch and pinprick [No Focal Deficits] : no focal deficits [Impaired Insight] : insight and judgment were intact [Oriented To Time, Place, And Person] : oriented to person, place, and time [Scleral Icterus] : No Scleral Icterus [Spider Angioma] : No spider angioma(s) were observed [Abdominal Bruit] : no abdominal bruit [Asterixis] : no asterixis observed [Abdominal  Ascites] : no ascites [Depression] : no depression [Hallucinations] : ~T no ~M hallucinations [Jaundice] : No jaundice

## 2024-03-30 NOTE — REVIEW OF SYSTEMS
[Feeling Tired] : feeling tired [Feeling Poorly] : feeling poorly [Chills] : no chills [Fever] : no fever [Eye Pain] : no eye pain [Red Eyes] : eyes not red [Earache] : no earache [Loss Of Hearing] : no hearing loss [Heart Rate Is Slow] : the heart rate was not slow [Shortness Of Breath] : no shortness of breath [Heart Rate Is Fast] : the heart rate was not fast [Abdominal Pain] : no abdominal pain [Wheezing] : no wheezing [Vomiting] : no vomiting [Dysuria] : no dysuria [Incontinence] : no incontinence [Joint Swelling] : no joint swelling [Skin Lesions] : no skin lesions [Joint Stiffness] : no joint stiffness [Skin Wound] : no skin wound [Confused] : no confusion [Suicidal] : not suicidal [Convulsions] : no convulsions [Sleep Disturbances] : no sleep disturbances [Hot Flashes] : no hot flashes [Proptosis] : no proptosis [Easy Bleeding] : no tendency for easy bleeding [Easy Bruising] : no tendency for easy bruising

## 2024-04-09 ENCOUNTER — APPOINTMENT (OUTPATIENT)
Dept: OPHTHALMOLOGY | Facility: CLINIC | Age: 69
End: 2024-04-09
Payer: MEDICARE

## 2024-04-09 ENCOUNTER — NON-APPOINTMENT (OUTPATIENT)
Age: 69
End: 2024-04-09

## 2024-04-09 PROCEDURE — 92012 INTRM OPH EXAM EST PATIENT: CPT

## 2024-04-14 ENCOUNTER — TRANSCRIPTION ENCOUNTER (OUTPATIENT)
Age: 69
End: 2024-04-14

## 2024-04-14 LAB
ACARBOXYPROTHROMBIN SERPL-MCNC: 0.2 NG/ML
ALBUMIN SERPL ELPH-MCNC: 4.3 G/DL
ALP BLD-CCNC: 100 U/L
ALPHA-1-FETOPROTEIN-L3: NORMAL %
ALPHA-1-FETOPROTEIN: 5.1 NG/ML
ALT SERPL-CCNC: 51 U/L
ANION GAP SERPL CALC-SCNC: 11 MMOL/L
AST SERPL-CCNC: 49 U/L
BASOPHILS # BLD AUTO: 0.02 K/UL
BASOPHILS NFR BLD AUTO: 0.6 %
BILIRUB SERPL-MCNC: 0.5 MG/DL
BUN SERPL-MCNC: 14 MG/DL
CALCIUM SERPL-MCNC: 9.3 MG/DL
CHLORIDE SERPL-SCNC: 96 MMOL/L
CO2 SERPL-SCNC: 27 MMOL/L
CREAT SERPL-MCNC: 0.93 MG/DL
EGFR: 66 ML/MIN/1.73M2
EOSINOPHIL # BLD AUTO: 0 K/UL
EOSINOPHIL NFR BLD AUTO: 0 %
GLUCOSE SERPL-MCNC: 100 MG/DL
HCT VFR BLD CALC: 41.8 %
HGB BLD-MCNC: 13.9 G/DL
IMM GRANULOCYTES NFR BLD AUTO: 0.3 %
INR PPP: 1.23 RATIO
LYMPHOCYTES # BLD AUTO: 0.8 K/UL
LYMPHOCYTES NFR BLD AUTO: 22.7 %
MAN DIFF?: NORMAL
MCHC RBC-ENTMCNC: 31.1 PG
MCHC RBC-ENTMCNC: 33.3 GM/DL
MCV RBC AUTO: 93.5 FL
MONOCYTES # BLD AUTO: 0.59 K/UL
MONOCYTES NFR BLD AUTO: 16.8 %
NEUTROPHILS # BLD AUTO: 2.1 K/UL
NEUTROPHILS NFR BLD AUTO: 59.6 %
PLATELET # BLD AUTO: 109 K/UL
POTASSIUM SERPL-SCNC: 4.2 MMOL/L
PROT SERPL-MCNC: 7.8 G/DL
PT BLD: 13.8 SEC
RBC # BLD: 4.47 M/UL
RBC # FLD: 13.3 %
SODIUM SERPL-SCNC: 134 MMOL/L
WBC # FLD AUTO: 3.52 K/UL

## 2024-04-19 ENCOUNTER — APPOINTMENT (OUTPATIENT)
Dept: OPHTHALMOLOGY | Facility: CLINIC | Age: 69
End: 2024-04-19
Payer: MEDICARE

## 2024-04-19 ENCOUNTER — NON-APPOINTMENT (OUTPATIENT)
Age: 69
End: 2024-04-19

## 2024-04-19 PROCEDURE — 92012 INTRM OPH EXAM EST PATIENT: CPT

## 2024-05-02 ENCOUNTER — NON-APPOINTMENT (OUTPATIENT)
Age: 69
End: 2024-05-02

## 2024-05-02 ENCOUNTER — APPOINTMENT (OUTPATIENT)
Dept: OPHTHALMOLOGY | Facility: CLINIC | Age: 69
End: 2024-05-02
Payer: MEDICARE

## 2024-05-02 PROCEDURE — 92250 FUNDUS PHOTOGRAPHY W/I&R: CPT

## 2024-05-02 PROCEDURE — 92014 COMPRE OPH EXAM EST PT 1/>: CPT

## 2024-06-11 ENCOUNTER — OUTPATIENT (OUTPATIENT)
Dept: OUTPATIENT SERVICES | Facility: HOSPITAL | Age: 69
LOS: 1 days | End: 2024-06-11
Payer: MEDICARE

## 2024-06-11 ENCOUNTER — APPOINTMENT (OUTPATIENT)
Dept: MRI IMAGING | Facility: CLINIC | Age: 69
End: 2024-06-11
Payer: MEDICARE

## 2024-06-11 DIAGNOSIS — Z98.890 OTHER SPECIFIED POSTPROCEDURAL STATES: Chronic | ICD-10-CM

## 2024-06-11 DIAGNOSIS — Z00.8 ENCOUNTER FOR OTHER GENERAL EXAMINATION: ICD-10-CM

## 2024-06-11 PROCEDURE — 74183 MRI ABD W/O CNTR FLWD CNTR: CPT | Mod: 26

## 2024-06-11 PROCEDURE — 74183 MRI ABD W/O CNTR FLWD CNTR: CPT

## 2024-06-11 PROCEDURE — A9585: CPT

## 2024-06-16 ENCOUNTER — TRANSCRIPTION ENCOUNTER (OUTPATIENT)
Age: 69
End: 2024-06-16

## 2024-06-21 ENCOUNTER — APPOINTMENT (OUTPATIENT)
Dept: INTERVENTIONAL RADIOLOGY/VASCULAR | Facility: CLINIC | Age: 69
End: 2024-06-21

## 2024-06-21 DIAGNOSIS — R16.0 HEPATOMEGALY, NOT ELSEWHERE CLASSIFIED: ICD-10-CM

## 2024-06-21 DIAGNOSIS — C22.0 LIVER CELL CARCINOMA: ICD-10-CM

## 2024-06-21 DIAGNOSIS — K74.60 UNSPECIFIED CIRRHOSIS OF LIVER: ICD-10-CM

## 2024-06-21 PROCEDURE — XXXXX: CPT | Mod: 1L

## 2024-06-21 NOTE — ASSESSMENT
[FreeTextEntry1] : This is a 68 year old female with hx of fatty liver, cirrhosis secondary to hepatitis C, and HCC who presents today for liver directed therapy.  1. HCC - now s/p Y90 4/21/22 to segment 7/8 - continues to feel well and remain active - 6/11/24 MRI demonstrates: > Stable post radioembolization changes in the right hepatic lobe without evidence of viable tumor. > No new lesions. - imaging reviewed by Dr. Page and I discussed with pt - discussed no viable tumor and no new lesions - discussed although no role for intervention at this time it is important to continue monitoring with imaging surveillance - f.u MR in 6 months 12/2024 - f/u with IR after - due for visit with Dr. Finch in 9/2024 as per last hepatology note- advised pt to schedule f.u  2. Elevated AFP - was 20.4 in 5/2022 - has been normal since July 2022 - last level 5.1 on 3/27/24 - continue to trend with hepatology team  I have provided the patient the opportunity to ask questions and have answered them to their satisfaction. They are encouraged to contact our office with any further questions, concerns, or issues. [Other: _____] : [unfilled]

## 2024-06-21 NOTE — HISTORY OF PRESENT ILLNESS
[FreeTextEntry1] :  This visit was provided via telehealth using real-time 2-way audio visual technology. The patient, JAY FOREMAN, was located at home, [address] [place] [address] , at the time of the visit. The provider, Farzaneh Can NP, was located at the medical office located in [facility] at the time of the visit. The patient, JAY FOREMAN and Provider participated in the telehealth encounter. Verbal consent for telehealth services was given on  06/21/2024 by the patient, JAY FOREMAN .  This is a 68 year old female with hx of fatty liver, cirrhosis secondary to hepatitis C, and HCC who presents today for follow up for liver directed therapy.  She completed treatment for hepatitis C with Pegasys, ribavirin plus Sovaldi on 6/13/14. She was found to have a lesion on ultrasound with an elevated alpha-fetoprotein and an MRI January 10, 2022 showed a 1.6 cm LIRADS 5 lesion in segment 5.   Pt was referred to IR by Dr. Cherry in 2/2022 to discuss liver directed therapy. She is now s/p Y90 to segment 7/8 on 4/21/22. She presents today for follow up after imaging to discuss findings.  Previously, she reported how she had been experiencing some right mid abdominal discomfort, which radiated to her back. Denied n/v, or change in bowel habits. Reports a hx of gallstones, was previously advised to have cholecystectomy, but deferred due to acute issues with her liver. She was able to have her cholecystectomy on 10/11/23. She feels much better since her surgery. She has not had any recent episodes of symptoms. Reports good energy and appetite level.   She had covid in 10/2023. Now back to baseline.   Today she reports continues to feel well. Has good appetite and energy level. Remains active and gardens. Denies abdominal pain, discoloration of the skin/eyes, abdominal distention, LE edema, confusion, hematochezia, melena, or n/v/d.    Hep: Jo Ann / Jessi Torres PMD: Moe  [0] : ~His/Her~ pain was 0 out of 10

## 2024-06-21 NOTE — HISTORY OF PRESENT ILLNESS
[FreeTextEntry1] :  This visit was provided via telehealth using real-time 2-way audio visual technology. The patient, JAY FOREMAN, was located at home, [address] [place] [address] , at the time of the visit. The provider, Farzaneh Can NP, was located at the medical office located in [facility] at the time of the visit. The patient, JAY FOREMAN and Provider participated in the telehealth encounter. Verbal consent for telehealth services was given on  06/21/2024 by the patient, JAY FOREMAN .  This is a 68 year old female with hx of fatty liver, cirrhosis secondary to hepatitis C, and HCC who presents today for follow up for liver directed therapy.  She completed treatment for hepatitis C with Pegasys, ribavirin plus Sovaldi on 6/13/14. She was found to have a lesion on ultrasound with an elevated alpha-fetoprotein and an MRI January 10, 2022 showed a 1.6 cm LIRADS 5 lesion in segment 5.   Pt was referred to IR by Dr. Cherry in 2/2022 to discuss liver directed therapy. She is now s/p Y90 to segment 7/8 on 4/21/22. She presents today for follow up after imaging to discuss findings.  Previously, she reported how she had been experiencing some right mid abdominal discomfort, which radiated to her back. Denied n/v, or change in bowel habits. Reports a hx of gallstones, was previously advised to have cholecystectomy, but deferred due to acute issues with her liver. She was able to have her cholecystectomy on 10/11/23. She feels much better since her surgery. She has not had any recent episodes of symptoms. Reports good energy and appetite level.   She had covid in 10/2023. Now back to baseline.   Today she reports continues to feel well. Has good appetite and energy level. Remains active and gardens. Denies abdominal pain, discoloration of the skin/eyes, abdominal distention, LE edema, confusion, hematochezia, melena, or n/v/d.    Hep: Jo Ann / Jessi Torres PMD: Moe  [0] : ~His/Her~ pain was 0 out of 10

## 2024-09-09 NOTE — PATIENT PROFILE ADULT - FUNCTIONAL ASSESSMENT - DAILY ACTIVITY 4.
LVM asking pt to call back to schedule appt with educator for GDM.   4 = No assist / stand by assistance

## 2024-12-04 ENCOUNTER — TRANSCRIPTION ENCOUNTER (OUTPATIENT)
Age: 69
End: 2024-12-04

## 2024-12-04 DIAGNOSIS — K74.60 UNSPECIFIED CIRRHOSIS OF LIVER: ICD-10-CM

## 2024-12-14 ENCOUNTER — APPOINTMENT (OUTPATIENT)
Dept: MRI IMAGING | Facility: CLINIC | Age: 69
End: 2024-12-14

## 2024-12-14 ENCOUNTER — OUTPATIENT (OUTPATIENT)
Dept: OUTPATIENT SERVICES | Facility: HOSPITAL | Age: 69
LOS: 1 days | End: 2024-12-14
Payer: MEDICARE

## 2024-12-14 DIAGNOSIS — Z00.8 ENCOUNTER FOR OTHER GENERAL EXAMINATION: ICD-10-CM

## 2024-12-14 DIAGNOSIS — R16.0 HEPATOMEGALY, NOT ELSEWHERE CLASSIFIED: ICD-10-CM

## 2024-12-14 DIAGNOSIS — Z98.890 OTHER SPECIFIED POSTPROCEDURAL STATES: Chronic | ICD-10-CM

## 2024-12-14 PROCEDURE — 74183 MRI ABD W/O CNTR FLWD CNTR: CPT | Mod: 26

## 2024-12-14 PROCEDURE — A9585: CPT

## 2024-12-14 PROCEDURE — 74183 MRI ABD W/O CNTR FLWD CNTR: CPT

## 2024-12-19 ENCOUNTER — TRANSCRIPTION ENCOUNTER (OUTPATIENT)
Age: 69
End: 2024-12-19

## 2024-12-23 ENCOUNTER — APPOINTMENT (OUTPATIENT)
Dept: INTERVENTIONAL RADIOLOGY/VASCULAR | Facility: CLINIC | Age: 69
End: 2024-12-23

## 2024-12-24 PROBLEM — F10.90 ALCOHOL USE: Status: ACTIVE | Noted: 2022-02-11

## 2025-01-03 ENCOUNTER — APPOINTMENT (OUTPATIENT)
Dept: INTERVENTIONAL RADIOLOGY/VASCULAR | Facility: CLINIC | Age: 70
End: 2025-01-03

## 2025-01-03 DIAGNOSIS — R77.2 ABNORMALITY OF ALPHAFETOPROTEIN: ICD-10-CM

## 2025-01-03 DIAGNOSIS — C22.0 LIVER CELL CARCINOMA: ICD-10-CM

## 2025-01-03 DIAGNOSIS — K74.60 UNSPECIFIED CIRRHOSIS OF LIVER: ICD-10-CM

## 2025-01-06 ENCOUNTER — APPOINTMENT (OUTPATIENT)
Dept: ORTHOPEDIC SURGERY | Facility: CLINIC | Age: 70
End: 2025-01-06

## 2025-03-19 ENCOUNTER — APPOINTMENT (OUTPATIENT)
Dept: ORTHOPEDIC SURGERY | Facility: CLINIC | Age: 70
End: 2025-03-19
Payer: MEDICARE

## 2025-03-19 DIAGNOSIS — M19.041 PRIMARY OSTEOARTHRITIS, RIGHT HAND: ICD-10-CM

## 2025-03-19 DIAGNOSIS — C22.0 LIVER CELL CARCINOMA: ICD-10-CM

## 2025-03-19 DIAGNOSIS — M19.042 PRIMARY OSTEOARTHRITIS, RIGHT HAND: ICD-10-CM

## 2025-03-19 DIAGNOSIS — K76.0 FATTY (CHANGE OF) LIVER, NOT ELSEWHERE CLASSIFIED: ICD-10-CM

## 2025-03-19 DIAGNOSIS — M65.312 TRIGGER THUMB, LEFT THUMB: ICD-10-CM

## 2025-03-19 DIAGNOSIS — M15.8 OTHER POLYOSTEOARTHRITIS: ICD-10-CM

## 2025-03-19 PROCEDURE — 73130 X-RAY EXAM OF HAND: CPT | Mod: 50

## 2025-03-19 PROCEDURE — 99203 OFFICE O/P NEW LOW 30 MIN: CPT

## 2025-04-18 ENCOUNTER — NON-APPOINTMENT (OUTPATIENT)
Age: 70
End: 2025-04-18

## 2025-04-18 ENCOUNTER — APPOINTMENT (OUTPATIENT)
Dept: OPHTHALMOLOGY | Facility: CLINIC | Age: 70
End: 2025-04-18
Payer: MEDICARE

## 2025-04-18 PROCEDURE — 92014 COMPRE OPH EXAM EST PT 1/>: CPT

## 2025-05-16 NOTE — ED ADULT NURSE NOTE - CAS TRG GEN SKIN CONDITION
Call patient to inform that results were normal    The blood test to confirm that you have gouty arthritis medication has been started for you to take daily to help prevent these occurrences of acute gout   Warm

## 2025-06-11 ENCOUNTER — APPOINTMENT (OUTPATIENT)
Dept: HEPATOLOGY | Facility: CLINIC | Age: 70
End: 2025-06-11

## 2025-06-16 ENCOUNTER — APPOINTMENT (OUTPATIENT)
Dept: MRI IMAGING | Facility: CLINIC | Age: 70
End: 2025-06-16

## 2025-06-16 ENCOUNTER — OUTPATIENT (OUTPATIENT)
Dept: OUTPATIENT SERVICES | Facility: HOSPITAL | Age: 70
LOS: 1 days | End: 2025-06-16
Payer: MEDICARE

## 2025-06-16 DIAGNOSIS — Z98.890 OTHER SPECIFIED POSTPROCEDURAL STATES: Chronic | ICD-10-CM

## 2025-06-16 DIAGNOSIS — C22.0 LIVER CELL CARCINOMA: ICD-10-CM

## 2025-06-16 PROCEDURE — 72197 MRI PELVIS W/O & W/DYE: CPT

## 2025-06-16 PROCEDURE — 74183 MRI ABD W/O CNTR FLWD CNTR: CPT | Mod: 26

## 2025-06-16 PROCEDURE — 74183 MRI ABD W/O CNTR FLWD CNTR: CPT

## 2025-06-16 PROCEDURE — A9585: CPT

## 2025-06-16 PROCEDURE — 72197 MRI PELVIS W/O & W/DYE: CPT | Mod: 26

## 2025-06-18 ENCOUNTER — LABORATORY RESULT (OUTPATIENT)
Age: 70
End: 2025-06-18

## 2025-06-20 ENCOUNTER — APPOINTMENT (OUTPATIENT)
Dept: HEPATOLOGY | Facility: CLINIC | Age: 70
End: 2025-06-20
Payer: MEDICARE

## 2025-06-20 VITALS
SYSTOLIC BLOOD PRESSURE: 122 MMHG | RESPIRATION RATE: 18 BRPM | DIASTOLIC BLOOD PRESSURE: 79 MMHG | BODY MASS INDEX: 25.46 KG/M2 | HEART RATE: 64 BPM | WEIGHT: 168 LBS | TEMPERATURE: 97.5 F | HEIGHT: 68 IN | OXYGEN SATURATION: 98 %

## 2025-06-20 PROCEDURE — 99214 OFFICE O/P EST MOD 30 MIN: CPT

## 2025-07-03 ENCOUNTER — NON-APPOINTMENT (OUTPATIENT)
Age: 70
End: 2025-07-03

## 2025-07-08 ENCOUNTER — NON-APPOINTMENT (OUTPATIENT)
Age: 70
End: 2025-07-08

## 2025-07-08 ENCOUNTER — APPOINTMENT (OUTPATIENT)
Dept: OPHTHALMOLOGY | Facility: CLINIC | Age: 70
End: 2025-07-08
Payer: MEDICARE

## 2025-07-08 PROCEDURE — 92250 FUNDUS PHOTOGRAPHY W/I&R: CPT

## 2025-07-08 PROCEDURE — 92014 COMPRE OPH EXAM EST PT 1/>: CPT

## 2025-08-29 ENCOUNTER — APPOINTMENT (OUTPATIENT)
Dept: INTERVENTIONAL RADIOLOGY/VASCULAR | Facility: CLINIC | Age: 70
End: 2025-08-29

## 2025-08-29 DIAGNOSIS — K86.2 CYST OF PANCREAS: ICD-10-CM

## 2025-08-29 DIAGNOSIS — C22.0 LIVER CELL CARCINOMA: ICD-10-CM

## 2025-09-02 DIAGNOSIS — Z12.11 ENCOUNTER FOR SCREENING FOR MALIGNANT NEOPLASM OF COLON: ICD-10-CM

## 2025-09-02 RX ORDER — POLYETHYLENE GLYCOL 3350 AND ELECTROLYTES WITH LEMON FLAVOR 236; 22.74; 6.74; 5.86; 2.97 G/4L; G/4L; G/4L; G/4L; G/4L
236 POWDER, FOR SOLUTION ORAL
Qty: 1 | Refills: 0 | Status: ACTIVE | COMMUNITY
Start: 2025-09-02 | End: 1900-01-01

## 2025-09-15 ENCOUNTER — APPOINTMENT (OUTPATIENT)
Dept: HEPATOLOGY | Facility: HOSPITAL | Age: 70
End: 2025-09-15

## 2025-09-15 ENCOUNTER — TRANSCRIPTION ENCOUNTER (OUTPATIENT)
Age: 70
End: 2025-09-15

## 2025-09-15 ENCOUNTER — RESULT REVIEW (OUTPATIENT)
Age: 70
End: 2025-09-15

## 2025-09-17 ENCOUNTER — APPOINTMENT (OUTPATIENT)
Dept: MAMMOGRAPHY | Facility: CLINIC | Age: 70
End: 2025-09-17
Payer: MEDICARE

## 2025-09-17 ENCOUNTER — APPOINTMENT (OUTPATIENT)
Dept: ULTRASOUND IMAGING | Facility: CLINIC | Age: 70
End: 2025-09-17

## 2025-09-17 PROCEDURE — 76641 ULTRASOUND BREAST COMPLETE: CPT | Mod: 26,50

## 2025-09-17 PROCEDURE — 77067 SCR MAMMO BI INCL CAD: CPT | Mod: 26

## 2025-09-17 PROCEDURE — 77063 BREAST TOMOSYNTHESIS BI: CPT | Mod: 26

## (undated) DEVICE — GOWN TRIMAX LG

## (undated) DEVICE — TUBING IV SET GRAVITY 3Y 100" MACRO

## (undated) DEVICE — BLADE SCALPEL SAFETYLOCK #10

## (undated) DEVICE — SPECIMEN CONTAINER 100ML

## (undated) DEVICE — SHEARS COVIDIEN ENDO SHEAR 5MM X 31CM W UNIPOLAR CAUTERY

## (undated) DEVICE — DRSG TELFA 3 X 8

## (undated) DEVICE — STOPCOCK 3 WAY W TUBE 35"

## (undated) DEVICE — SUCTION YANKAUER NO CONTROL VENT

## (undated) DEVICE — SYR LUER LOK 30CC

## (undated) DEVICE — SOL IRR POUR NS 0.9% 500ML

## (undated) DEVICE — NDL INJ SCLERO INTERJECT 23G

## (undated) DEVICE — SYR LUER LOK 20CC

## (undated) DEVICE — GLV 7.5 PROTEXIS (WHITE)

## (undated) DEVICE — VALVE YELLOW PORT SEAL PLUS 5MM

## (undated) DEVICE — TUBING SUCTION 20FT

## (undated) DEVICE — DRSG OPSITE 2.5 X 2"

## (undated) DEVICE — SUT PDS II 0 18" ENDOLOOP LIGATURE

## (undated) DEVICE — LITHOTRIPY BASKET TRAPEZOID 2.5CM

## (undated) DEVICE — PACK IV START WITH CHG

## (undated) DEVICE — BRUSH COLONOSCOPY CYTOLOGY

## (undated) DEVICE — DRSG STERISTRIPS 0.5 X 4"

## (undated) DEVICE — TUBING TRUWAVE PRESSURE MALE/FEMALE 72"

## (undated) DEVICE — ENDOCATCH 10MM SPECIMEN POUCH

## (undated) DEVICE — GLV 7 PROTEXIS (WHITE)

## (undated) DEVICE — SUT BIOSYN 4-0 18" P-12

## (undated) DEVICE — SOL IRR POUR H2O 250ML

## (undated) DEVICE — DRAPE TOWEL BLUE 17" X 24"

## (undated) DEVICE — DRAPE INSTRUMENT POUCH 6.75" X 11"

## (undated) DEVICE — TUBING INSUFFLATION LAP FILTER 10FT

## (undated) DEVICE — CATH IV SAFE INSYTE 14G X 1.75" (ORANGE)

## (undated) DEVICE — CATH IV SAFE BC 22G X 1" (BLUE)

## (undated) DEVICE — GLV 8.5 PROTEXIS (WHITE)

## (undated) DEVICE — SNARE POLYP MINI ACCUSNR 1.5 X 3CM

## (undated) DEVICE — WARMING BLANKET UPPER ADULT

## (undated) DEVICE — DISSECTOR ENDO PEANUT 5MM

## (undated) DEVICE — MARKING PEN W RULER

## (undated) DEVICE — NDL INJ SCLERO INTERJECT 25G

## (undated) DEVICE — BITE BLOCK ADULT 20 X 27MM (GREEN)

## (undated) DEVICE — TROCAR COVIDIEN VERSASTEP PLUS 11MM STANDARD

## (undated) DEVICE — TROCAR COVIDIEN VERSAPORT BLADELESS OPTICAL 12MM STANDARD

## (undated) DEVICE — FOLEY HOLDER STATLOCK 2 WAY ADULT

## (undated) DEVICE — SUT POLYSORB 0 36" GU-46

## (undated) DEVICE — POSITIONER FOAM HEAD CRADLE (PINK)

## (undated) DEVICE — DRAPE C ARM UNIVERSAL

## (undated) DEVICE — CATH IV SAFE BC 20G X 1.16" (PINK)

## (undated) DEVICE — SNARE POLYP SENS SM 13MM 240CM

## (undated) DEVICE — TROCAR COVIDIEN BLUNT TIP HASSAN 10MM

## (undated) DEVICE — DRAPE MAYO STAND 30"

## (undated) DEVICE — SPHINCTEROTOME CLEVERCUT WIRE 25MM  2.8MM X 170CM

## (undated) DEVICE — SOL INJ NS 0.9% 500ML 2 PORT

## (undated) DEVICE — MEDICATION LABELS W MARKER

## (undated) DEVICE — SENSOR O2 FINGER ADULT

## (undated) DEVICE — TROCAR APPLIED MEDICAL KII BALLOON BLUNT TIP 12MM X 100MM

## (undated) DEVICE — D HELP - CLEARVIEW CLEARIFY SYSTEM

## (undated) DEVICE — TUBING IRRIGATION DAVOL SYSTEM X STREAM

## (undated) DEVICE — ELCTR GROUNDING PAD ADULT COVIDIEN

## (undated) DEVICE — BIOPSY FORCEP RADIAL JAW 4 STANDARD WITH NEEDLE

## (undated) DEVICE — GLV 8 PROTEXIS (WHITE)

## (undated) DEVICE — TROCAR COVIDIEN VERSAONE FIXATION CANNULA 5MM

## (undated) DEVICE — VENODYNE/SCD SLEEVE CALF LARGE

## (undated) DEVICE — LOK DVC RX AND BIOPSY

## (undated) DEVICE — NDL BIOPSY MONOPTY 18G X 20CM

## (undated) DEVICE — BRUSH CYTO RAP EXCHG 3MM

## (undated) DEVICE — TROCAR COVIDIEN VERSASTEP SLEEVE

## (undated) DEVICE — PAPIL BILRTH II 6-5FRX0.035IN

## (undated) DEVICE — POSITIONER FOAM EGG CRATE ULNAR 2PCS (PINK)

## (undated) DEVICE — PACK LAP CHOLE LAP APPENDECTOMY

## (undated) DEVICE — TROCAR COVIDIEN VERSAONE BLADED FIXATION 11MM STANDARD

## (undated) DEVICE — BLADE SCALPEL SAFETYLOCK #15

## (undated) DEVICE — GLV 6.5 PROTEXIS (WHITE)